# Patient Record
Sex: MALE | Race: WHITE | Employment: OTHER | URBAN - METROPOLITAN AREA
[De-identification: names, ages, dates, MRNs, and addresses within clinical notes are randomized per-mention and may not be internally consistent; named-entity substitution may affect disease eponyms.]

---

## 2017-04-13 ENCOUNTER — HOSPITAL ENCOUNTER (OUTPATIENT)
Dept: CT IMAGING | Age: 57
Discharge: HOME | End: 2017-04-13
Attending: INTERNAL MEDICINE
Payer: COMMERCIAL

## 2017-04-13 DIAGNOSIS — D68.59 PROTEIN C DEFICIENCY (HCC): ICD-10-CM

## 2017-04-13 PROCEDURE — 70450 CT HEAD/BRAIN W/O DYE: CPT

## 2017-05-19 ENCOUNTER — HOSPITAL ENCOUNTER (OUTPATIENT)
Dept: ULTRASOUND IMAGING | Age: 57
Discharge: HOME | End: 2017-05-19
Attending: INTERNAL MEDICINE
Payer: COMMERCIAL

## 2017-05-19 DIAGNOSIS — R74.8 ELEVATED LIVER ENZYMES: ICD-10-CM

## 2017-05-19 PROCEDURE — 76700 US EXAM ABDOM COMPLETE: CPT

## 2017-05-22 NOTE — PROGRESS NOTES
Abdominal ultrasound report reviewed and shows fatty infiltration which is likely the cause of his elevated liver enzymes, but if he can not lose weight and normalize the liver panel, he may need to consult with a liver specialist.

## 2018-01-25 ENCOUNTER — HOSPITAL ENCOUNTER (OUTPATIENT)
Dept: MRI IMAGING | Age: 58
Discharge: HOME | End: 2018-01-25
Attending: INTERNAL MEDICINE
Payer: COMMERCIAL

## 2018-01-25 VITALS — BODY MASS INDEX: 36.96 KG/M2 | WEIGHT: 236 LBS

## 2018-01-25 DIAGNOSIS — R53.1 PROGRESSIVE FOCAL MOTOR WEAKNESS: ICD-10-CM

## 2018-01-25 DIAGNOSIS — M62.81 MUSCLE WEAKNESS OF RIGHT UPPER EXTREMITY: ICD-10-CM

## 2018-01-25 PROCEDURE — 70553 MRI BRAIN STEM W/O & W/DYE: CPT

## 2018-01-25 PROCEDURE — A9577 INJ MULTIHANCE: HCPCS

## 2018-01-25 PROCEDURE — 74011250636 HC RX REV CODE- 250/636

## 2018-01-25 RX ORDER — SODIUM CHLORIDE 0.9 % (FLUSH) 0.9 %
10 SYRINGE (ML) INJECTION
Status: COMPLETED | OUTPATIENT
Start: 2018-01-25 | End: 2018-01-25

## 2018-01-25 RX ADMIN — GADOBENATE DIMEGLUMINE 20 ML: 529 INJECTION, SOLUTION INTRAVENOUS at 09:38

## 2018-01-25 RX ADMIN — Medication 10 ML: at 09:38

## 2018-01-25 NOTE — PROGRESS NOTES
MRI Brain report reviewed and showed changes commonly seen in patients his age without any evidence of an abnormality that would account for his symptoms

## 2018-02-27 PROBLEM — E66.01 OBESITY, MORBID (HCC): Status: ACTIVE | Noted: 2018-02-27

## 2018-03-01 PROBLEM — R74.8 ELEVATED LIVER ENZYMES: Status: ACTIVE | Noted: 2018-03-01

## 2018-03-01 PROBLEM — R74.8 ELEVATED CK: Status: ACTIVE | Noted: 2018-03-01

## 2018-03-02 ENCOUNTER — APPOINTMENT (OUTPATIENT)
Dept: GENERAL RADIOLOGY | Age: 58
End: 2018-03-02
Attending: EMERGENCY MEDICINE
Payer: COMMERCIAL

## 2018-03-02 ENCOUNTER — HOSPITAL ENCOUNTER (EMERGENCY)
Age: 58
Discharge: HOME | End: 2018-03-02
Attending: EMERGENCY MEDICINE
Payer: COMMERCIAL

## 2018-03-02 VITALS
OXYGEN SATURATION: 94 % | HEIGHT: 66 IN | TEMPERATURE: 98.4 F | HEART RATE: 73 BPM | WEIGHT: 245 LBS | BODY MASS INDEX: 39.37 KG/M2 | RESPIRATION RATE: 20 BRPM | DIASTOLIC BLOOD PRESSURE: 87 MMHG | SYSTOLIC BLOOD PRESSURE: 154 MMHG

## 2018-03-02 DIAGNOSIS — M33.20 POLYMYOSITIS (HCC): Primary | ICD-10-CM

## 2018-03-02 LAB
ALBUMIN SERPL-MCNC: 3.2 G/DL (ref 3.5–5)
ALBUMIN/GLOB SERPL: 0.9 {RATIO} (ref 1.2–3.5)
ALP SERPL-CCNC: 69 U/L (ref 50–136)
ALT SERPL-CCNC: 355 U/L (ref 12–65)
ANION GAP SERPL CALC-SCNC: 7 MMOL/L (ref 7–16)
APPEARANCE UR: CLEAR
AST SERPL-CCNC: 199 U/L (ref 15–37)
ATRIAL RATE: 83 BPM
BACTERIA URNS QL MICRO: 0 /HPF
BASOPHILS # BLD: 0 K/UL (ref 0–0.2)
BASOPHILS NFR BLD: 0 % (ref 0–2)
BILIRUB SERPL-MCNC: 0.4 MG/DL (ref 0.2–1.1)
BILIRUB UR QL: NEGATIVE
BNP SERPL-MCNC: 14 PG/ML
BUN SERPL-MCNC: 16 MG/DL (ref 6–23)
CALCIUM SERPL-MCNC: 8.5 MG/DL (ref 8.3–10.4)
CALCULATED P AXIS, ECG09: 39 DEGREES
CALCULATED R AXIS, ECG10: 18 DEGREES
CALCULATED T AXIS, ECG11: 39 DEGREES
CASTS URNS QL MICRO: ABNORMAL /LPF
CHLORIDE SERPL-SCNC: 103 MMOL/L (ref 98–107)
CK SERPL-CCNC: 7520 U/L (ref 21–215)
CO2 SERPL-SCNC: 27 MMOL/L (ref 21–32)
COLOR UR: YELLOW
CREAT SERPL-MCNC: 0.6 MG/DL (ref 0.8–1.5)
DIAGNOSIS, 93000: NORMAL
DIFFERENTIAL METHOD BLD: ABNORMAL
EOSINOPHIL # BLD: 0 K/UL (ref 0–0.8)
EOSINOPHIL NFR BLD: 0 % (ref 0.5–7.8)
EPI CELLS #/AREA URNS HPF: ABNORMAL /HPF
ERYTHROCYTE [DISTWIDTH] IN BLOOD BY AUTOMATED COUNT: 14.5 % (ref 11.9–14.6)
GLOBULIN SER CALC-MCNC: 3.7 G/DL (ref 2.3–3.5)
GLUCOSE SERPL-MCNC: 155 MG/DL (ref 65–100)
GLUCOSE UR STRIP.AUTO-MCNC: NEGATIVE MG/DL
HCT VFR BLD AUTO: 42.7 % (ref 41.1–50.3)
HGB BLD-MCNC: 14.3 G/DL (ref 13.6–17.2)
HGB UR QL STRIP: ABNORMAL
IMM GRANULOCYTES # BLD: 0.1 K/UL (ref 0–0.5)
IMM GRANULOCYTES NFR BLD AUTO: 1 % (ref 0–5)
KETONES UR QL STRIP.AUTO: NEGATIVE MG/DL
LEUKOCYTE ESTERASE UR QL STRIP.AUTO: NEGATIVE
LYMPHOCYTES # BLD: 0.3 K/UL (ref 0.5–4.6)
LYMPHOCYTES NFR BLD: 3 % (ref 13–44)
MCH RBC QN AUTO: 32.6 PG (ref 26.1–32.9)
MCHC RBC AUTO-ENTMCNC: 33.5 G/DL (ref 31.4–35)
MCV RBC AUTO: 97.3 FL (ref 79.6–97.8)
MONOCYTES # BLD: 0.2 K/UL (ref 0.1–1.3)
MONOCYTES NFR BLD: 2 % (ref 4–12)
NEUTS SEG # BLD: 9.7 K/UL (ref 1.7–8.2)
NEUTS SEG NFR BLD: 94 % (ref 43–78)
NITRITE UR QL STRIP.AUTO: NEGATIVE
P-R INTERVAL, ECG05: 150 MS
PH UR STRIP: 6 [PH] (ref 5–9)
PLATELET # BLD AUTO: 221 K/UL (ref 150–450)
PMV BLD AUTO: 8.9 FL (ref 10.8–14.1)
POTASSIUM SERPL-SCNC: 4.3 MMOL/L (ref 3.5–5.1)
PROT SERPL-MCNC: 6.9 G/DL (ref 6.3–8.2)
PROT UR STRIP-MCNC: ABNORMAL MG/DL
Q-T INTERVAL, ECG07: 350 MS
QRS DURATION, ECG06: 94 MS
QTC CALCULATION (BEZET), ECG08: 411 MS
RBC # BLD AUTO: 4.39 M/UL (ref 4.23–5.67)
RBC #/AREA URNS HPF: ABNORMAL /HPF
SODIUM SERPL-SCNC: 137 MMOL/L (ref 136–145)
SP GR UR REFRACTOMETRY: 1.02 (ref 1–1.02)
T4 FREE SERPL-MCNC: 1.1 NG/DL (ref 0.78–1.46)
TROPONIN I BLD-MCNC: 0.01 NG/ML (ref 0.02–0.05)
TSH SERPL DL<=0.005 MIU/L-ACNC: 12.8 UIU/ML (ref 0.36–3.74)
UROBILINOGEN UR QL STRIP.AUTO: 1 EU/DL (ref 0.2–1)
VENTRICULAR RATE, ECG03: 83 BPM
WBC # BLD AUTO: 10.4 K/UL (ref 4.3–11.1)
WBC URNS QL MICRO: ABNORMAL /HPF

## 2018-03-02 PROCEDURE — 81001 URINALYSIS AUTO W/SCOPE: CPT | Performed by: EMERGENCY MEDICINE

## 2018-03-02 PROCEDURE — 82550 ASSAY OF CK (CPK): CPT | Performed by: EMERGENCY MEDICINE

## 2018-03-02 PROCEDURE — 96374 THER/PROPH/DIAG INJ IV PUSH: CPT | Performed by: EMERGENCY MEDICINE

## 2018-03-02 PROCEDURE — 96361 HYDRATE IV INFUSION ADD-ON: CPT | Performed by: EMERGENCY MEDICINE

## 2018-03-02 PROCEDURE — 83880 ASSAY OF NATRIURETIC PEPTIDE: CPT | Performed by: EMERGENCY MEDICINE

## 2018-03-02 PROCEDURE — 83874 ASSAY OF MYOGLOBIN: CPT | Performed by: EMERGENCY MEDICINE

## 2018-03-02 PROCEDURE — 99285 EMERGENCY DEPT VISIT HI MDM: CPT | Performed by: EMERGENCY MEDICINE

## 2018-03-02 PROCEDURE — 93005 ELECTROCARDIOGRAM TRACING: CPT | Performed by: EMERGENCY MEDICINE

## 2018-03-02 PROCEDURE — 85025 COMPLETE CBC W/AUTO DIFF WBC: CPT | Performed by: EMERGENCY MEDICINE

## 2018-03-02 PROCEDURE — 80053 COMPREHEN METABOLIC PANEL: CPT | Performed by: EMERGENCY MEDICINE

## 2018-03-02 PROCEDURE — 84443 ASSAY THYROID STIM HORMONE: CPT | Performed by: EMERGENCY MEDICINE

## 2018-03-02 PROCEDURE — 84484 ASSAY OF TROPONIN QUANT: CPT

## 2018-03-02 PROCEDURE — 84439 ASSAY OF FREE THYROXINE: CPT | Performed by: EMERGENCY MEDICINE

## 2018-03-02 PROCEDURE — 71046 X-RAY EXAM CHEST 2 VIEWS: CPT

## 2018-03-02 PROCEDURE — 74011250636 HC RX REV CODE- 250/636: Performed by: EMERGENCY MEDICINE

## 2018-03-02 RX ADMIN — SODIUM CHLORIDE 1000 ML: 900 INJECTION, SOLUTION INTRAVENOUS at 14:00

## 2018-03-02 RX ADMIN — METHYLPREDNISOLONE SODIUM SUCCINATE 125 MG: 125 INJECTION, POWDER, FOR SOLUTION INTRAMUSCULAR; INTRAVENOUS at 17:05

## 2018-03-02 NOTE — PROGRESS NOTES
Visited pt while he was waiting for care in ER. Pt's spouse asked for a pillow;  supplied same & assured pt & spouse that we care about them and were working to meet their needs.

## 2018-03-02 NOTE — ED NOTES
I have reviewed discharge instructions with the patient. The patient verbalized understanding. Patient to follow up with PMD and RTED with any changes/concerns. Patient expresses understanding. No changes in med regimen- patient to follow recent changes as set forth by his MD. Patient from eD via wheelchair with 1 person assist. Patient strongly advised to RTED with any changes/concerns. Patient driven home by wife.

## 2018-03-02 NOTE — ED PROVIDER NOTES
HPI Comments: 40-year-old male with a recently diagnosed polymyositis presents to the ER due to increasing weakness, some mild shortness of breath and diaphoretic episodes. Patient is currently on steroids and Imuran    Review showed the patient has had high CPK levels in the 10,000 range, elevated TSH, and positive urine myoglobins. The patient has been managed as an outpatient with the assistance of his rheumatologist and primary care provider. Patient is a 61 y.o. male presenting with abnormal lab results. The history is provided by the patient and the spouse. Abnormal Lab Results   This is a recurrent problem. The current episode started more than 1 week ago. The problem has been gradually worsening. Associated symptoms include shortness of breath. Pertinent negatives include no chest pain, no abdominal pain and no headaches. The symptoms are aggravated by exertion, standing and walking.  The symptoms are relieved by rest.        Past Medical History:   Diagnosis Date    Angina pectoris ([address]     Anxiety     ASHD (arteriosclerotic heart disease) 2014    Calcium score = 1056    Coronary artery disease     Coronary atherosclerosis due to calcified coronary lesion (CODE) 5/23/2016    Diverticulitis     Duodenal ulcer     DVT (deep venous thrombosis) (HCC)     2 episodes    GERD (gastroesophageal reflux disease)     HTN (hypertension)     Hypercholesterolemia     Hypothyroidism     Kidney stones     Personal history of DVT (deep vein thrombosis) 11/18/2016    Protein C deficiency ([address]     Sleep apnea     using CPAP    Varicella        Past Surgical History:   Procedure Laterality Date    HX COLONOSCOPY  2013    HX HEART CATHETERIZATION  2015    HX URETEROLITHOTOMY Right 2013    HX WISDOM TEETH EXTRACTION  1980's         Family History:   Problem Relation Age of Onset    Hypertension Brother     Heart Disease Father     Alzheimer Father     Heart Surgery Father      mid to late 58s    Other Mother      Gannon Ast issue\"    Pulmonary Embolism Mother      protien C deficiency    Heart Surgery Paternal Aunt     Heart Attack Paternal Uncle     Heart Surgery Paternal Uncle      in 76s    Diabetes Paternal Uncle     Kidney Disease Paternal Uncle     Kidney Disease Son      secondary to congenital vessel disease       Social History     Social History    Marital status:      Spouse name: N/A    Number of children: N/A    Years of education: N/A     Occupational History    Trainer Unknown     3[employer]     Social History Main Topics    Smoking status: Former Smoker     Quit date: 5/2/2006    Smokeless tobacco: Former User    Alcohol use 0.5 oz/week     1 Standard drinks or equivalent per week    Drug use: No    Sexual activity: Not on file     Other Topics Concern    Not on file     Social History Narrative         ALLERGIES: Review of patient's allergies indicates no known allergies. Review of Systems   Constitutional: Negative for activity change, chills, diaphoresis and fever. HENT: Negative for dental problem, hearing loss, nosebleeds, rhinorrhea and sore throat. Eyes: Negative for pain, discharge, redness and visual disturbance. Respiratory: Positive for shortness of breath. Negative for cough and chest tightness. Cardiovascular: Negative for chest pain, palpitations and leg swelling. Gastrointestinal: Negative for abdominal pain, constipation, diarrhea, nausea and vomiting. Endocrine: Negative for cold intolerance, heat intolerance, polydipsia and polyuria. Genitourinary: Negative for dysuria and flank pain. Musculoskeletal: Negative for arthralgias, back pain, joint swelling, myalgias and neck pain. Skin: Negative for pallor and rash. Allergic/Immunologic: Negative for environmental allergies and food allergies. Neurological: Negative for dizziness, tremors, light-headedness, numbness and headaches. Hematological: Negative for adenopathy.  Does not bruise/bleed easily. Psychiatric/Behavioral: Negative for confusion and dysphoric mood. The patient is not nervous/anxious and is not hyperactive. All other systems reviewed and are negative. Vitals:    03/02/18 1221 03/02/18 1313 03/02/18 1456 03/02/18 1525   BP: 142/81 141/70 127/65 136/68   Pulse: 93 92     Resp: 20      Temp: 98.3 °F (36.8 °C) 98.1 °F (36.7 °C)     SpO2: 95% 94% 96% 96%   Weight: 111.1 kg (245 lb)      Height: 5' 6\" (1.676 m)               Physical Exam   Constitutional: He is oriented to person, place, and time. He appears well-developed and well-nourished. He appears distressed. HENT:   Head: Normocephalic and atraumatic. Mouth/Throat: Oropharynx is clear and moist.   Eyes: Conjunctivae and EOM are normal. Pupils are equal, round, and reactive to light. Right eye exhibits no discharge. Left eye exhibits no discharge. No scleral icterus. Neck: Normal range of motion. Neck supple. No JVD present. Cardiovascular: Normal rate, regular rhythm, normal heart sounds and intact distal pulses. Exam reveals no gallop and no friction rub. No murmur heard. Pulmonary/Chest: Effort normal and breath sounds normal. No respiratory distress. He has no wheezes. Abdominal: Soft. Bowel sounds are normal. He exhibits no distension. There is no hepatosplenomegaly. There is no tenderness. There is no rebound and no guarding. Musculoskeletal: Normal range of motion. He exhibits no edema or tenderness. Trace edema   Lymphadenopathy:     He has no cervical adenopathy. Neurological: He is alert and oriented to person, place, and time. He has normal strength. No cranial nerve deficit or sensory deficit. He exhibits normal muscle tone. GCS eye subscore is 4. GCS verbal subscore is 5. GCS motor subscore is 6. Skin: Skin is warm and dry. No rash noted. He is not diaphoretic. No erythema. mild diaphoresis   Psychiatric: He has a normal mood and affect.  His speech is normal and behavior is normal. Judgment and thought content normal. Cognition and memory are normal.   Nursing note and vitals reviewed. MDM  Number of Diagnoses or Management Options  Polymyositis ([address]): established and worsening  Diagnosis management comments: 58-year-old male with polymyositis. Workup today reveals gradually improving. CPK. Kidney function is normal.  Patient's LFTs are slightly elevated. Urine myoglobin is a send out test and will have available for 3-4 days. At this point, the patient is clinically stable. I will discharge the patient home. I have encouraged him to monitor for any worsening symptoms and to make sure he is followed by his doctors on Monday morning. Patient is instructed to return to the ER if he has any new or worsening symptoms or other concerns over the course of this weekend. Amount and/or Complexity of Data Reviewed  Clinical lab tests: ordered and reviewed  Tests in the radiology section of CPT®: ordered and reviewed  Tests in the medicine section of CPT®: ordered and reviewed  Obtain history from someone other than the patient: yes  Review and summarize past medical records: yes  Independent visualization of images, tracings, or specimens: yes    Risk of Complications, Morbidity, and/or Mortality  Presenting problems: high  Diagnostic procedures: moderate  Management options: moderate  General comments: Elements of this note have been dictated via voice recognition software. Text and phrases may be limited by the accuracy of the software. The chart has been reviewed, but errors may still be present.       Patient Progress  Patient progress: stable        ED Course       Procedures

## 2018-03-02 NOTE — ED TRIAGE NOTES
Patient reports increased weakness and shortness of breath. Recent thyroid lab work elevated, per patient.

## 2018-03-02 NOTE — ED NOTES
Patient assisted to restroom. Patient able to stand/pivot to wheelchair with minimal assistance from RNs.

## 2018-03-05 LAB — MYOGLOBIN UR-MCNC: 3441 NG/ML (ref 0–13)

## 2018-04-16 PROBLEM — M62.82 RHABDOMYOLYSIS: Status: ACTIVE | Noted: 2018-01-01

## 2018-06-18 PROBLEM — T38.0X5A STEROID-INDUCED DIABETES MELLITUS (HCC): Status: ACTIVE | Noted: 2018-06-18

## 2018-06-18 PROBLEM — E09.9 STEROID-INDUCED DIABETES MELLITUS (HCC): Status: ACTIVE | Noted: 2018-06-18

## 2018-06-18 PROBLEM — R73.09 ELEVATED RANDOM BLOOD GLUCOSE LEVEL: Status: ACTIVE | Noted: 2018-06-18

## 2018-07-16 ENCOUNTER — HOSPITAL ENCOUNTER (OUTPATIENT)
Dept: GENERAL RADIOLOGY | Age: 58
Discharge: HOME | End: 2018-07-16
Payer: COMMERCIAL

## 2018-07-16 DIAGNOSIS — R06.00 DYSPNEA, UNSPECIFIED TYPE: ICD-10-CM

## 2018-07-16 PROCEDURE — 71046 X-RAY EXAM CHEST 2 VIEWS: CPT

## 2018-07-23 ENCOUNTER — HOSPITAL ENCOUNTER (OUTPATIENT)
Dept: PHYSICAL THERAPY | Age: 58
Discharge: HOME | End: 2018-07-23
Attending: INTERNAL MEDICINE
Payer: COMMERCIAL

## 2018-07-23 DIAGNOSIS — M33.20 POLYMYOSITIS (HCC): ICD-10-CM

## 2018-07-23 DIAGNOSIS — M62.81 MUSCLE WEAKNESS (GENERALIZED): ICD-10-CM

## 2018-07-23 PROCEDURE — 97110 THERAPEUTIC EXERCISES: CPT

## 2018-07-23 PROCEDURE — 97161 PT EVAL LOW COMPLEX 20 MIN: CPT

## 2018-07-23 NOTE — THERAPY EVALUATION
Zac Benítez  : 1960      Payor: Reyes Medrano / Plan: [facility] [facility] / Product Type: PPO /  [address] [facility], [address], [place], [id number] [place] [address] Road  [phone]   [phone]       OUTPATIENT PHYSICAL THERAPY:Initial Assessment and Daily Note 2018  Visit # 1   ICD-10: Treatment Diagnosis:                  Unsteadiness on feet (R26.81)    Polymyositis (HCC) [M33.20]  Muscle weakness (generalized) [M62.81]       Precautions/Allergies:   Review of patient's allergies indicates no known allergies. Fall Risk Score: 1 (? 5 = High Risk)  MD Orders: Eval and Treat  MEDICAL/REFERRING DIAGNOSIS:  Polymyositis (HCC) [M33.20]  Muscle weakness (generalized) [M62.81]   DATE OF ONSET: Chronic  REFERRING PHYSICIAN: Ronni Gonzales MD  RETURN PHYSICIAN APPOINTMENT: TBD by Zac Benítez      INITIAL ASSESSMENT:  Mr. Zac Benítez has attended 1 physical therapy session including initial evaluation. Zac Benítez has been experiencing mm weakness and decreased cardiopulmonary endurance with concomitant decreased flexibility, decreased postural and core strength, decreased functional tolerance. Zac Benítez will benefit from skilled PT (medically necessary) to address above deficits affecting participation in basic ADLs and overall functional tolerance. Manual techniques (stretching, joint mobilizations, soft tissue mobilization/myofascial release), postural exercises/education, therapeutic techniques/activities, and HEP will be performed as appropriate addressing Raquel Viera's current condition. PROBLEM LIST (Impacting functional limitations):  1. Decreased Strength  2. Decreased ADL/Functional Activities  3. Decreased Transfer Abilities  4. Decreased Ambulation Ability/Technique  5. Decreased Balance  6. Increased Pain  7. Decreased Activity Tolerance  8. Increased Fatigue  9.  Increased Shortness of Breath  10. Decreased Flexibility/Joint Mobility  11. Decreased [place] with Home Exercise Program INTERVENTIONS PLANNED:  1. Balance Exercise  2. Bed Mobility  3. Cold  4. Cryotherapy  5. Electrical Stimulation  6. Family Education  7. Gait Training  8. Heat  9. Home Exercise Program (HEP)  10. Manual Therapy  11. Neuromuscular Re-education/Strengthening  12. Range of Motion (ROM)  13. Therapeutic Activites  14. Therapeutic Exercise/Strengthening  15. Transfer Training   TREATMENT PLAN:  Effective Dates: 7.23.18 TO 10/21/2018 (90 days). Frequency/Duration: 2 times a week for 90 Days  GOALS: (Goals have been discussed and agreed upon with patient.)  Short Term Goals 4 weeks   1. Shayy Chowdhury will be independent with HEP  2. Shayy Pulse will participate in LE stretching program to increase flexibility  3. Shayy Pulse will be able to ambulate 10 minutes with no rest break and O2 level >90%. 4. Shayy Pulse will be able to stand with good posture and no SOB or R[place]ORD for 5 minutes improving current level of function. 5. Shayy Pulse will participate in LE strengthening program with weights as appropriate to help with gait and elevations  6. Cherylee Pulse will participate in static and dynamic balance activities to decrease the risk for falls  7. Shayy Pulse will tolerate manual therapy/joint mobilizations/soft tissue to increase ROM and decrease pain  8. Long Term Goals 8 weeks   1. Shayy Pulse will improve TUG to <=12 seconds showing improved ambulation safety. 2. Shayy Pulse will demonstrate 5/5 LE strength on manual muscle testing  3.  Shayy Pulse will be able to perform SLS >5 seconds bilaterally to help with gait and improve balance  Rehabilitation Potential For Stated Goals: Good  Regarding Carmina Viera's therapy, I certify that the treatment plan above will be carried out by a therapist or under their direction. Thank you for this referral,  Chelsi Warren DPT     Referring Physician Signature: Karely Li MD              Date                    HISTORY:   History of Present Injury/Illness (Reason for Referral):  Shayy Chowdhury has been experiencing muscle weakness ---- Shayy Chowdhury states last year he started getting weakness in arms and legs. He lost ROM of R arm and couldn't raise it up to his head. He finally was encouraged to go to MD by wife. Shayy Chowdhury first thought it was Myasthenias Gravis; however, after bloodwork it was not. Blood work revealed Polymyositis. He's been on steroids since February---he got to the point where he could not get out of bed in March. He has hx of Rhabdomyolysis that required hospitalization. He has since gotten stronger,  But is looking to get in better shape. His voice has been affected as well. .        -Present symptoms/complaints (on day of evaluation)  Pain Scale:  · Current: 0/10  · Best: 0/10  · Worst: 0/10      Past Medical History/Comorbidities:   Mr. Humble Guevara  has a past medical history of Angina pectoris ([address] Anxiety; ASHD (arteriosclerotic heart disease) (2014); Coronary artery disease; Coronary atherosclerosis due to calcified coronary lesion (CODE) (5/23/2016); Diverticulitis; Duodenal ulcer; DVT (deep venous thrombosis) ([address] GERD (gastroesophageal reflux disease); HTN (hypertension); Hypercholesterolemia; Hypothyroidism; Kidney stones; Myositis; Personal history of DVT (deep vein thrombosis) (11/18/2016); Protein C deficiency ([address] Rhabdomyolysis (2018); Sleep apnea; 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 ([address] and Varicella. Mr. Humble Guevara  has a past surgical history that includes hx colonoscopy (2013); hx wisdom teeth extraction (1980's); hx ureterolithotomy (Right, 2013); and hx heart catheterization (2015).   Social History/Living Environment:        Social History     Social History    Marital status:      Spouse name: N/A  Number of children: N/A    Years of education: N/A     Occupational History    Trainer Unknown     3M     Social History Main Topics    Smoking status: Former Smoker     Quit date: 5/2/2006    Smokeless tobacco: Former User    Alcohol use 0.5 oz/week     1 Standard drinks or equivalent per week      Comment: ocass    Drug use: No    Sexual activity: Not on file     Other Topics Concern    Not on file     Social History Narrative     Prior Level of Function/Work/Activity:  Independent    Active Ambulatory Problems     Diagnosis Date Noted    Hypercholesterolemia     Sleep apnea     Primary hypothyroidism     GERD (gastroesophageal reflux disease)     ASHD (arteriosclerotic heart disease)     Coronary atherosclerosis due to calcified coronary lesion (CODE) 05/23/2016    Personal history of DVT (deep vein thrombosis) 11/18/2016    HTN (hypertension)     Obesity, morbid ([address] 02/27/2018    Coronary artery disease     DVT (deep venous thrombosis) (HCC)     Elevated liver enzymes 03/01/2018    Elevated CK 03/01/2018    Rhabdomyolysis 01/01/2018    Myositis     Elevated random blood glucose level 06/18/2018    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[address] 06/18/2018    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 ([address]      Resolved Ambulatory Problems     Diagnosis Date Noted    DVT (deep venous thrombosis) (HCC)      Past Medical History:   Diagnosis Date    Angina pectoris ([address]     Anxiety     ASHD (arteriosclerotic heart disease) 2014    Coronary artery disease     Coronary atherosclerosis due to calcified coronary lesion (CODE) 5/23/2016    Diverticulitis     Duodenal ulcer     DVT (deep venous thrombosis) (HCC)     GERD (gastroesophageal reflux disease)     HTN (hypertension)     Hypercholesterolemia     Hypothyroidism     Kidney stones     Myositis     Personal history of DVT (deep vein thrombosis) 11/18/2016    Protein C deficiency ([address]     Rhabdomyolysis 2018    Sleep apnea     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 ([facility]     Varicella      Note: Patient denies any increase of symptoms with cough, sneeze or valsalva. Patient denies any saddle paresthesia or bowel/bladder deficits. Personal Factors:          Sex:  male        Age:  61 y.o. Current Medications:    Current Outpatient Prescriptions:     methylPREDNISolone (MEDROL) 32 mg tablet, Take 1.5 tablets by mouth daily, Disp: 45 Tab, Rfl: 1    fluconazole (DIFLUCAN) 100 mg tablet, Take 1 Tab by mouth daily for 7 days. FDA advises cautious prescribing of oral fluconazole in pregnancy. , Disp: 7 Tab, Rfl: 0    terbinafine HCl (LAMISIL) 250 mg tablet, Take 1 Tab by mouth daily. , Disp: 14 Tab, Rfl: 0    ketoconazole (NIZORAL) 2 % topical cream, Apply  to affected area daily. , Disp: 60 g, Rfl: 3    nitroglycerin (NITROSTAT) 0.4 mg SL tablet, 1 Tab by SubLINGual route every five (5) minutes as needed for Chest Pain., Disp: 25 Tab, Rfl: 3    amLODIPine (NORVASC) 5 mg tablet, Take 1 Tab by mouth daily. , Disp: 90 Tab, Rfl: 3    lisinopril-hydroCHLOROthiazide (PRINZIDE, ZESTORETIC) 10-12.5 mg per tablet, Take 1 Tab by mouth daily. , Disp: 90 Tab, Rfl: 3    metFORMIN (GLUCOPHAGE) 500 mg tablet, Take 2 Tabs by mouth two (2) times daily (with meals). , Disp: 360 Tab, Rfl: 3    alcohol swabs (ALCOHOL PREP PADS) padm, Use with insulin injections and to check glucose 4 times daily, E09.9, Disp: 400 Pad, Rfl: 3    DENZEL PEN NEEDLE 32 gauge x 5/32\" ndle, Use with insulin up to 4 times daily, E09.9, Disp: 400 Pen Needle, Rfl: 3    UNITHROID 112 mcg tablet, Take 2 tabs PO daily on empty stomach, ANDRADE 1, Disp: 180 Tab, Rfl: 3    NOVOLOG FLEXPEN U-100 INSULIN 100 unit/mL inpn, Use with correction 2/50>150, max daily dose 20 units, Disp: 6 Pen, Rfl: 3    magic mouthwash solution, Magic mouth wash  Maalox Lidocaine 2% viscous  Diphenhydramine oral solution  Nystatin                                    pharmacy to mix equal portions of ingredients to a total volume as indicated in the dispense amount. Swish and swallow 1 tsp qid, Disp: 360 mL, Rfl: 1    azaTHIOprine (IMURAN) 50 mg tablet, Take 2 Tabs by mouth two (2) times a day., Disp: 360 Tab, Rfl: 1    DULoxetine (CYMBALTA) 30 mg capsule, Take 1 Cap by mouth daily. , Disp: 90 Cap, Rfl: 1    buPROPion SR (WELLBUTRIN, ZYBAN) 200 mg SR tablet, Take 1 Tab by mouth two (2) times a day., Disp: 180 Tab, Rfl: 1    metoprolol succinate (TOPROL-XL) 25 mg XL tablet, Take 1 Tab by mouth daily. , Disp: 90 Tab, Rfl: 1    warfarin (JANTOVEN) 5 mg tablet, Take 1 Tab by mouth daily. , Disp: 90 Tab, Rfl: 1    Prothrombin Time Test Strips (COAGUCHEK XS) strp, Test 1-4 times per month as directed, Disp: 48 Strip, Rfl: 1    fexofenadine (ALLEGRA) 180 mg tablet, Take 180 mg by mouth daily. , Disp: , Rfl:     aspirin 81 mg tablet, Take 81 mg by mouth daily. , Disp: , Rfl:     Omega-3-DHA-EPA-Fish Oil (FISH OIL) 1,000 (120-180) mg Cap, Take 3 Caps by mouth daily. , Disp: , Rfl:      Date Last Reviewed:  7/23/2018   Number of Personal Factors/Comorbidities that affect the Plan of Care: 3+: HIGH COMPLEXITY   EXAMINATION:   Observation/Orthostatic Postural Assessment:          *Rounded shoulders  Palpation:          Nothing tender and no complaints of tenderness.     ROM:            AROM/PROM         Joint: Eval Date: 7/23/18  Re-Assess Date:  Re-Assess Date:    Active ROM RIGHT LEFT RIGHT LEFT RIGHT LEFT   Knee Extension 0 0       Knee Flexion [facility]       Hip Flexion 40  60       Hip Abduction         Lumbar Flexion 100%        Lumbar Extension 100%        Lumbar Side-bending 100% 100%       Lumbar Rotation 90% 90%          Strength:     Eval Date: 7/23/18  Re-Assess Date:  Re-Assess Date:      RIGHT LEFT RIGHT LEFT RIGHT LEFT   Knee Flexion  4+/5 4+/5       Knee Extension (L3, L4) 4/5 4/5       Hip Flexion (L1, L2) 4/5 4/5       Hip Abduction (L5, S1) 4/5 4/5       Ankle Dorsiflexion  5/5 5/5       Great Toe Extension (L5) Single leg stance right/left: 2 seconds bilaterally EO              Deep squat: Can perform but off balance. Ham 90/90:   RIGHT LE: 40   LEFT LE: 60      Neurological Screen:    RADIATING SYMPTOMS?: YES/NO*--NONE  Functional Mobility:  Affecting participation in basic ADLs and functional tasks.  cardiopulmonary endurance. Balance: Body Structures Involved:  1. Bones  2. Joints  3. Muscles  4. Ligaments Body Functions Affected:  1. Sensory/Pain  2. Neuromusculoskeletal  3. Movement Related Activities and Participation Affected:  1. Mobility  2. Self Care   Number of elements that affect the Plan of Care: 4+: HIGH COMPLEXITY   CLINICAL PRESENTATION:   Presentation: Stable and uncomplicated: LOW COMPLEXITY   CLINICAL DECISION MAKING:   Tool Used: Timed Up and Go (TUG)  Score:  Initial: 20 seconds Most Recent: X seconds (Date: -- )   Interpretation of Score: The test measures, in seconds, the time taken by an individual to stand up from a standard arm chair (seat height 46 cm [18 in], arm height 65 cm [25.6 in]), walk a distance of 3 meters (118 in, approx 10 ft), turn, walk back to the chair and sit down. If the individual takes longer than 14 seconds to complete TUG, this indicates risk for falls. Score 7 7.5-10.5 11-14 14.5-17.5 18-21 21.5-24.5 25+   Modifier CH CI CJ CK CL CM CN     ? Mobility - Walking and Moving Around:     - CURRENT STATUS: CL - 60%-79% impaired, limited or restricted    - GOAL STATUS: CK - 40%-59% impaired, limited or restricted    - D/C STATUS:  ---------------To be determined---------------      Medical Necessity:   · Skilled intervention continues to be required due to decreased cardiopulmonary endurance affecting QOL and overall health and ability to perform ADLs.     Reason for Services/Other Comments:  · Patient continues to require skilled intervention due to  above deficits affecting participation in basic ADLs and overall functional tolerance. Use of outcome tool(s) and clinical judgement create a POC that gives a: Clear prediction of patient's progress: LOW COMPLEXITY   TREATMENT:   (In addition to Assessment/Re-Assessment sessions the following treatments were rendered)  THERAPEUTIC EXERCISE: (25 minutes):  Exercises per grid below to improve mobility, strength and balance. Required minimal visual and verbal cues to promote proper body alignment and promote proper body posture. Progressed resistance, range and complexity of movement as indicated. Date:  7.23.18 Date:   Date:     Activity/Exercise Parameters Parameters Parameters   Marching 10     Heel Raises 10     Standing Hip Abduction 10     Standing Hip Extension 10     Minisquats 10                     Treatment/Session Assessment:  Izabela Voss verbalized understanding of role of PT and POC. · Pain/ Symptoms: Initial:   0/10 Post Session:  0/10 ·   Compliance with Program/Exercises: Will assess as treatment progresses. · Recommendations/Intent for next treatment session: \"Next visit will focus on advancements to more challenging activities\".     Future Appointments  Date Time Provider Jagjit Rosas   7/27/2018 3:62 AM S[facility] CT [facility] SLICE UNIT 1 [facility]   8/1/2018 8:45 AM Jair Maria MD [facility] UCDG UCD   8/13/2018 9:30 AM Beto Mobley MD [facility]   8/17/2018 9:30 AM EBEN NURSE ONLY [facility] EBEN EBEN   8/17/2018 10:00 AM Rene Dozier PA-C END BS ENDO   8/23/2018 12:00 PM Ronan Harrison MD [facility]   9/17/2018 2:30 PM Moiz Celis [facility]TR [facility]       Total Treatment Duration: 15 min eval, 25 min TE  PT Patient Time In/Time Out  Time In: 1600  Time Out: 1200 [facility] Dr Valentina Knight, DPT

## 2018-07-23 NOTE — PROGRESS NOTES
Ambulatory/Rehab Services H2 Model Falls Risk Assessment    Risk Factor Pts. ·   Confusion/Disorientation/Impulsivity  []    4 ·   Symptomatic Depression  []   2 ·   Altered Elimination  []   1 ·   Dizziness/Vertigo  []   1 ·   Gender (Male)  []   1 ·   Any administered antiepileptics (anticonvulsants):  []   2 ·   Any administered benzodiazepines:  []   1 ·   Visual Impairment (specify):  []   1 ·   Portable Oxygen Use  []   1 ·   Orthostatic ? BP  []   1 ·   History of Recent Falls (within 3 mos.)  [x]   5     Ability to Rise from Chair (choose one) Pts. ·   Ability to rise in a single movement  [x]   0 ·   Pushes up, successful in one attempt  []   1 ·   Multiple attempts, but successful  []   3 ·   Unable to rise without assistance  []   4   Total: (5 or greater = High Risk) *5     Falls Prevention Plan:   []                Physical Limitations to Exercise (specify):  CGA al balance activities. []                Mobility Assistance Device (type):   []                Exercise/Equipment Adaptation (specify):    ©2010 [facility] [employer] [address] [place] [address] Patent #[id number].  Federal Law prohibits the replication, distribution or use without written permission from [facility] [employer]

## 2018-07-27 ENCOUNTER — HOSPITAL ENCOUNTER (OUTPATIENT)
Dept: CT IMAGING | Age: 58
Discharge: HOME | End: 2018-07-27
Attending: INTERNAL MEDICINE
Payer: COMMERCIAL

## 2018-07-27 ENCOUNTER — HOSPITAL ENCOUNTER (OUTPATIENT)
Dept: PHYSICAL THERAPY | Age: 58
Discharge: HOME | End: 2018-07-27
Attending: INTERNAL MEDICINE
Payer: COMMERCIAL

## 2018-07-27 DIAGNOSIS — M33.20 POLYMYOSITIS (HCC): ICD-10-CM

## 2018-07-27 DIAGNOSIS — R06.00 DYSPNEA, UNSPECIFIED TYPE: ICD-10-CM

## 2018-07-27 PROCEDURE — 71250 CT THORAX DX C-: CPT

## 2018-07-27 NOTE — PROGRESS NOTES
Please inform patient that the CT of the chest was normal aside from calcifications in the coronary arteries.   Mushtaq Haque MD

## 2018-07-31 NOTE — PROGRESS NOTES
Pt notified that CT is normal. Pt asked if Cpft is still necessary? They were not scheduled, I forward the call to scheduling for assistance.

## 2018-08-01 ENCOUNTER — HOSPITAL ENCOUNTER (OUTPATIENT)
Dept: PHYSICAL THERAPY | Age: 58
Discharge: HOME | End: 2018-08-01
Attending: INTERNAL MEDICINE
Payer: COMMERCIAL

## 2018-08-01 PROCEDURE — 97110 THERAPEUTIC EXERCISES: CPT

## 2018-08-01 NOTE — THERAPY EVALUATION
Fatemeh Leahy : 1960 Payor: Delena Boeck / Plan: [place] [facility] / Product Type: PPO /  [address] at [facility] [address] [facility] [address], [facility], [address] [facility] [phone]   [phone] OUTPATIENT PHYSICAL THERAPY:Daily Note 2018  Visit # 2 ICD-10: Treatment Diagnosis:  
     
  
  
  
Unsteadiness on feet (R26.81) Polymyositis ([address] [R96.54] Muscle weakness (generalized) [M62.81] Precautions/Allergies:  
Review of patient's allergies indicates no known allergies. Fall Risk Score: 1 (? 5 = High Risk) MD Orders: Eval and Treat  MEDICAL/REFERRING DIAGNOSIS: 
Polymyositis DATE OF ONSET: Chronic REFERRING PHYSICIAN: Barbara Bush MD 
RETURN PHYSICIAN APPOINTMENT: TBD by Fatemeh Leahy INITIAL ASSESSMENT:  Mr. Fatemeh Leahy has attended 1 physical therapy session including initial evaluation. Fatemeh Leahy has been experiencing mm weakness and decreased cardiopulmonary endurance with concomitant decreased flexibility, decreased postural and core strength, decreased functional tolerance. Fatemeh Leahy will benefit from skilled PT (medically necessary) to address above deficits affecting participation in basic ADLs and overall functional tolerance. Manual techniques (stretching, joint mobilizations, soft tissue mobilization/myofascial release), postural exercises/education, therapeutic techniques/activities, and HEP will be performed as appropriate addressing Lalo Viera's current condition. PROBLEM LIST (Impacting functional limitations): 1. Decreased Strength 2. Decreased ADL/Functional Activities 3. Decreased Transfer Abilities 4. Decreased Ambulation Ability/Technique 5. Decreased Balance 6. Increased Pain 7. Decreased Activity Tolerance 8. Increased Fatigue 9. Increased Shortness of Breath 10. Decreased Flexibility/Joint Mobility 11.  Decreased [place] with Home Exercise Program INTERVENTIONS PLANNED: 
1. Balance Exercise 2. Bed Mobility 3. Cold 4. Cryotherapy 5. Electrical Stimulation 6. Family Education 7. Gait Training 8. Heat 9. Home Exercise Program (HEP) 10. Manual Therapy 11. Neuromuscular Re-education/Strengthening 12. Range of Motion (ROM) 13. Therapeutic Activites 14. Therapeutic Exercise/Strengthening 15. Transfer Training TREATMENT PLAN: 
Effective Dates: 7.23.18 TO 10/21/2018 (90 days). Frequency/Duration: 2 times a week for 90 Days GOALS: (Goals have been discussed and agreed upon with patient.) Short Term Goals 4 weeks 1. Fatemeh Leahy will be independent with HEP 2. Fatemeh Leahy will participate in LE stretching program to increase flexibility 3. Fatemeh Leahy will be able to ambulate 10 minutes with no rest break and O2 level >90%. 4. Fatemeh Leahy will be able to stand with good posture and no SOB or [place] for 5 minutes improving current level of function. 5. Fatemeh Leahy will participate in LE strengthening program with weights as appropriate to help with gait and elevations 6. Fatemeh Leahy will participate in static and dynamic balance activities to decrease the risk for falls 7. Fatemeh Leahy will tolerate manual therapy/joint mobilizations/soft tissue to increase ROM and decrease pain 8. Long Term Goals 8 weeks 1. Fatemeh Leahy will improve TUG to <=12 seconds showing improved ambulation safety. 2. Fatemeh Leahy will demonstrate 5/5 LE strength on manual muscle testing 3. Fatemeh Leahy will be able to perform SLS >5 seconds bilaterally to help with gait and improve balance Rehabilitation Potential For Stated Goals: Good Regarding Lalo Viera's therapy, I certify that the treatment plan above will be carried out by a therapist or under their direction. Thank you for this referral, 
Torrey Monroe DPT Referring Physician Signature: Mary Anne Moreno MD              Date HISTORY:  
History of Present Injury/Illness (Reason for Referral): Loree Ulrich has been experiencing muscle weakness ---- Loree Ulrich states last year he started getting weakness in arms and legs. He lost ROM of R arm and couldn't raise it up to his head. He finally was encouraged to go to MD by wife. Loree Ulrich first thought it was Myasthenias Gravis; however, after bloodwork it was not. Blood work revealed Polymyositis. He's been on steroids since February---he got to the point where he could not get out of bed in March. He has hx of Rhabdomyolysis that required hospitalization. He has since gotten stronger,  But is looking to get in better shape. His voice has been affected as well. .   
 
 
-Present symptoms/complaints (on day of evaluation) Pain Scale: · Current: 0/10 · Best: 0/10 · Worst: 0/10 Past Medical History/Comorbidities:  
Mr. Denice Lee  has a past medical history of Angina pectoris ([address] Anxiety; ASHD (arteriosclerotic heart disease) (2014); Coronary artery disease; Coronary atherosclerosis due to calcified coronary lesion (CODE) (5/23/2016); Diverticulitis; Duodenal ulcer; DVT (deep venous thrombosis) ([address] GERD (gastroesophageal reflux disease); HTN (hypertension); Hypercholesterolemia; Hypothyroidism; Kidney stones; Myositis; Personal history of DVT (deep vein thrombosis) (11/18/2016); Protein C deficiency ([address] Rhabdomyolysis (2018); Sleep apnea; 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 ([address] and Varicella. Mr. Denice Lee  has a past surgical history that includes hx colonoscopy (2013); hx wisdom teeth extraction (1980's); hx ureterolithotomy (Right, 2013); and hx heart catheterization (2015). Social History/Living Environment:  
  
 
Social History Social History  Marital status:  Spouse name: N/A  
 Number of children: N/A  
 Years of education: N/A Occupational History  Trainer Unknown [employer] Social History Main Topics  Smoking status: Former Smoker Quit date: 5/2/2006  Smokeless tobacco: Former User  Alcohol use 0.5 oz/week 1 Standard drinks or equivalent per week Comment: ocass  Drug use: No  
 Sexual activity: Not on file Other Topics Concern  Not on file Social History Narrative Prior Level of Function/Work/Activity: 
Independent Active Ambulatory Problems Diagnosis Date Noted  Hypercholesterolemia  Sleep apnea  Primary hypothyroidism  GERD (gastroesophageal reflux disease)  ASHD (arteriosclerotic heart disease)  Coronary atherosclerosis due to calcified coronary lesion (CODE) 05/23/2016  Personal history of DVT (deep vein thrombosis) 11/18/2016  
 HTN (hypertension)  Obesity, morbid ([address] 02/27/2018  Coronary artery disease  DVT (deep venous thrombosis) ([address]  Elevated liver enzymes 03/01/2018  Elevated CK 03/01/2018  Rhabdomyolysis 01/01/2018  Myositis  Elevated random blood glucose level 06/18/2018  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[address] 06/18/2018  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 ([address] Resolved Ambulatory Problems Diagnosis Date Noted  DVT (deep venous thrombosis) ([address] Past Medical History:  
Diagnosis Date  Angina pectoris ([address]  Anxiety  ASHD (arteriosclerotic heart disease) 2014  Coronary artery disease  Coronary atherosclerosis due to calcified coronary lesion (CODE) 5/23/2016  Diverticulitis  Duodenal ulcer  DVT (deep venous thrombosis) ([address]  GERD (gastroesophageal reflux disease)  HTN (hypertension)  Hypercholesterolemia  Hypothyroidism  Kidney stones  Myositis  Personal history of DVT (deep vein thrombosis) 11/18/2016  Protein C deficiency ([address]  Rhabdomyolysis 2018  Sleep apnea  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 ([address]  Varicella Note: Patient denies any increase of symptoms with cough, sneeze or valsalva. Patient denies any saddle paresthesia or bowel/bladder deficits. Personal Factors:   
      Sex:  male Age:  61 y.o. Current Medications:   
Current Outpatient Prescriptions:  
  ketoconazole (NIZORAL) 2 % topical cream, Apply  to affected area daily. , Disp: , Rfl:  
  methylPREDNISolone (MEDROL) 32 mg tablet, Take 1.5 tablets by mouth daily, Disp: 45 Tab, Rfl: 1 
  nitroglycerin (NITROSTAT) 0.4 mg SL tablet, 1 Tab by SubLINGual route every five (5) minutes as needed for Chest Pain., Disp: 25 Tab, Rfl: 3 
  amLODIPine (NORVASC) 5 mg tablet, Take 1 Tab by mouth daily. , Disp: 90 Tab, Rfl: 3 
  lisinopril-hydroCHLOROthiazide (PRINZIDE, ZESTORETIC) 10-12.5 mg per tablet, Take 1 Tab by mouth daily. , Disp: 90 Tab, Rfl: 3 
  metFORMIN (GLUCOPHAGE) 500 mg tablet, Take 2 Tabs by mouth two (2) times daily (with meals). , Disp: 360 Tab, Rfl: 3 
  alcohol swabs (ALCOHOL PREP PADS) padm, Use with insulin injections and to check glucose 4 times daily, E09.9, Disp: 400 Pad, Rfl: 3 
  DENZEL PEN NEEDLE 32 gauge x 5/32\" ndle, Use with insulin up to 4 times daily, E09.9, Disp: 400 Pen Needle, Rfl: 3 
  UNITHROID 112 mcg tablet, Take 2 tabs PO daily on empty stomach, ANDRADE 1, Disp: 180 Tab, Rfl: 3 
  NOVOLOG FLEXPEN U-100 INSULIN 100 unit/mL inpn, Use with correction 2/50>150, max daily dose 20 units, Disp: 6 Pen, Rfl: 3 
  magic mouthwash solution, Magic mouth wash  Maalox Lidocaine 2% viscous  Diphenhydramine oral solution  Nystatin                                    pharmacy to mix equal portions of ingredients to a total volume as indicated in the dispense amount. Swish and swallow 1 tsp qid, Disp: 360 mL, Rfl: 1 
  azaTHIOprine (IMURAN) 50 mg tablet, Take 2 Tabs by mouth two (2) times a day., Disp: 360 Tab, Rfl: 1   DULoxetine (CYMBALTA) 30 mg capsule, Take 1 Cap by mouth daily. , Disp: 90 Cap, Rfl: 1 
  buPROPion SR (WELLBUTRIN, ZYBAN) 200 mg SR tablet, Take 1 Tab by mouth two (2) times a day., Disp: 180 Tab, Rfl: 1 
  metoprolol succinate (TOPROL-XL) 25 mg XL tablet, Take 1 Tab by mouth daily. , Disp: 90 Tab, Rfl: 1 
  warfarin (JANTOVEN) 5 mg tablet, Take 1 Tab by mouth daily. , Disp: 90 Tab, Rfl: 1 
  Prothrombin Time Test Strips (COAGUCHEK XS) strp, Test 1-4 times per month as directed, Disp: 48 Strip, Rfl: 1 
  fexofenadine (ALLEGRA) 180 mg tablet, Take 180 mg by mouth daily. , Disp: , Rfl:  
  aspirin 81 mg tablet, Take 81 mg by mouth daily. , Disp: , Rfl:  
  Omega-3-DHA-EPA-Fish Oil (FISH OIL) 1,000 (120-180) mg Cap, Take 3 Caps by mouth daily. , Disp: , Rfl:   
 
Date Last Reviewed:  2018 EXAMINATION:  
Observation/Orthostatic Postural Assessment:   
      *Rounded shoulders Palpation:   
      Nothing tender and no complaints of tenderness. ROM:   
       
AROM/PROM Joint: Eval Date: 18  Re-Assess Date:  Re-Assess Date: Active ROM RIGHT LEFT RIGHT LEFT RIGHT LEFT Knee Extension 0 0 Knee Flexion [facility] Hip Flexion 40  60 Hip Abduction Lumbar Flexion 100% Lumbar Extension 100% Lumbar Side-bending 100% 100% Lumbar Rotation 90% 90% Strength:   
 Eval Date: 18  Re-Assess Date:  Re-Assess Date:   
  RIGHT LEFT RIGHT LEFT RIGHT LEFT Knee Flexion  4+/5 4+/5 Knee Extension (L3, L4) 4/5 4/5 Hip Flexion (L1, L2) 4/5 4/5 Hip Abduction (L5, S1) 4/5 4/5 Ankle Dorsiflexion  5/5 5/5 Great Toe Extension (L5) Single leg stance right/left: 2 seconds bilaterally EO 
            Deep squat: Can perform but off balance. Ham 90/90: 
 RIGHT LE: 40 LEFT LE: 60 
 
 
Neurological Screen:  
 RADIATING SYMPTOMS?: YES/NO*--NONE Functional Mobility:  Affecting participation in basic ADLs and functional tasks.  cardiopulmonary endurance. Balance: Tool Used: Timed Up and Go (TUG) Score:  Initial: 20 seconds Most Recent: X seconds (Date: -- ) Interpretation of Score: The test measures, in seconds, the time taken by an individual to stand up from a standard arm chair (seat height 46 cm [18 in], arm height 65 cm [25.6 in]), walk a distance of 3 meters (118 in, approx 10 ft), turn, walk back to the chair and sit down. If the individual takes longer than 14 seconds to complete TUG, this indicates risk for falls. Score 7 7.5-10.5 11-14 14.5-17.5 18-21 21.5-24.5 25+ Modifier CH CI CJ CK CL CM CN  
 
? Mobility - Walking and Moving Around:  
  - CURRENT STATUS: CL - 60%-79% impaired, limited or restricted  - GOAL STATUS: CK - 40%-59% impaired, limited or restricted  - D/C STATUS:  ---------------To be determined--------------- Medical Necessity:  
· Skilled intervention continues to be required due to decreased cardiopulmonary endurance affecting QOL and overall health and ability to perform ADLs. Reason for Services/Other Comments: 
· Patient continues to require skilled intervention due to  above deficits affecting participation in basic ADLs and overall functional tolerance. TREATMENT:  
(In addition to Assessment/Re-Assessment sessions the following treatments were rendered) THERAPEUTIC EXERCISE: (38 minutes):  Exercises per grid below to improve mobility, strength and balance. Required minimal visual and verbal cues to promote proper body alignment and promote proper body posture. Progressed resistance, range and complexity of movement as indicated. Date: 
7.23.18 Date: 
8.1.18 Date: Activity/Exercise Parameters Parameters Parameters Marching 10 10x E Heel Raises 10 10x standing Standing Hip Abduction 10 10x Standing Hip Extension 10 10x Minisquats 10 10x3 sit to stand NuStep  Level 3 5 minutes. Gastroc Stretch   30\"x3 Shuttle  50# 2 minutes Stairs  3x Treatment/Session Assessment:  Mary Salinas verbalized understanding of role of PT and POC.   Progressing strength and flexibility slowly as he is very deconditioned. · Pain/ Symptoms: Initial:   0/10 Post Session:  0/10 · Compliance with Program/Exercises: Will assess as treatment progresses. · Recommendations/Intent for next treatment session: \"Next visit will focus on advancements to more challenging activities\". Future Appointments Date Time Provider Jagjit Rosas 8/3/2018 1:00 PM Dane Greenwood DPT [facility]  
8/8/2018 4:00 PM Dane Greenwood DPT [facility]  
8/10/2018 8:45 AM Dane Greenwood DPT [facility]  
8/13/2018 9:30 AM Emily Rodriguez MD [facility]  
8/14/2018 7:15 AM GVLLE NUCLEAR STRESS [facility]  
8/15/2018 7:15 AM GVLLE NUCLEAR STRESS [facility]  
8/15/2018 4:00 PM Dane Greenwood DPT [facility]  
8/17/2018 8:45 AM Dane Greenwood DPT [facility]  
8/17/2018 9:30 AM EBEN NURSE ONLY [facility] EBEN EBEN  
8/17/2018 10:00 AM Nakul Schroeder PA-C END [facility]  
8/20/2018 4:00 PM [facility]  
8/23/2018 7:30 AM GVLLE ECHO 21 [facility]  
8/23/2018 12:00 PM Primus Cogan, MD [facility]  
8/29/2018 4:00 PM Dane Greenwood DPT [facility]  
8/31/2018 8:45 AM Dane Greenwood DPT [facility]  
8/31/2018 12:30 PM Kita Amaro MD [facility]  
9/17/2018 2:30 PM Kobe Celis [facility]  
9/21/2018 9:45 AM PP PFT LAB [facility] PP PP Total Treatment Duration: 38 minutes Rx PT Patient Time In/Time Out Time In: 1663 Time Out: 1600 Dane Greenwood DPT

## 2018-08-03 ENCOUNTER — HOSPITAL ENCOUNTER (OUTPATIENT)
Dept: PHYSICAL THERAPY | Age: 58
Discharge: HOME | End: 2018-08-03
Attending: INTERNAL MEDICINE
Payer: COMMERCIAL

## 2018-08-03 PROCEDURE — 97110 THERAPEUTIC EXERCISES: CPT

## 2018-08-03 NOTE — THERAPY EVALUATION
Sully Cason : 1960 Payor: Cameron Reeder / Plan: [place] [facility] / Product Type: PPO /  [address] at [facility] [address] [facility], [address] [facility], [facility], [id number] [place] [address] Road [phone]   [phone] OUTPATIENT PHYSICAL THERAPY:Daily Note 8/3/2018  Visit # 3 ICD-10: Treatment Diagnosis:  
     
  
  
  
Unsteadiness on feet (R26.81) Polymyositis ([facility] [address] [C36.06] Muscle weakness (generalized) [M62.81] Precautions/Allergies:  
Review of patient's allergies indicates no known allergies. Fall Risk Score: 1 (? 5 = High Risk) MD Orders: Eval and Treat  MEDICAL/REFERRING DIAGNOSIS: 
Polymyositis DATE OF ONSET: Chronic REFERRING PHYSICIAN: Turner Gonzalez MD 
RETURN PHYSICIAN APPOINTMENT: TBD by Sully Cason INITIAL ASSESSMENT:  Mr. Sully Cason has attended 1 physical therapy session including initial evaluation. Sully Cason has been experiencing mm weakness and decreased cardiopulmonary endurance with concomitant decreased flexibility, decreased postural and core strength, decreased functional tolerance. Sully Cason will benefit from skilled PT (medically necessary) to address above deficits affecting participation in basic ADLs and overall functional tolerance. Manual techniques (stretching, joint mobilizations, soft tissue mobilization/myofascial release), postural exercises/education, therapeutic techniques/activities, and HEP will be performed as appropriate addressing eBau Viera's current condition. PROBLEM LIST (Impacting functional limitations): 1. Decreased Strength 2. Decreased ADL/Functional Activities 3. Decreased Transfer Abilities 4. Decreased Ambulation Ability/Technique 5. Decreased Balance 6. Increased Pain 7. Decreased Activity Tolerance 8. Increased Fatigue 9. Increased Shortness of Breath 10. Decreased Flexibility/Joint Mobility 11.  Decreased Sawyer with Home Exercise Program INTERVENTIONS PLANNED: 
1. Balance Exercise 2. Bed Mobility 3. Cold 4. Cryotherapy 5. Electrical Stimulation 6. Family Education 7. Gait Training 8. Heat 9. Home Exercise Program (HEP) 10. Manual Therapy 11. Neuromuscular Re-education/Strengthening 12. Range of Motion (ROM) 13. Therapeutic Activites 14. Therapeutic Exercise/Strengthening 15. Transfer Training TREATMENT PLAN: 
Effective Dates: 7.23.18 TO 10/21/2018 (90 days). Frequency/Duration: 2 times a week for 90 Days GOALS: (Goals have been discussed and agreed upon with patient.) Short Term Goals 4 weeks 1. Jacky Roblero will be independent with HEP 2. Jacky Roblero will participate in LE stretching program to increase flexibility 3. Jacky Roblero will be able to ambulate 10 minutes with no rest break and O2 level >90%. 4. Jacky Roblero will be able to stand with good posture and no SOB or [place] for 5 minutes improving current level of function. 5. Jacky Roblero will participate in LE strengthening program with weights as appropriate to help with gait and elevations 6. Jacky Roblero will participate in static and dynamic balance activities to decrease the risk for falls 7. Jacky Roblero will tolerate manual therapy/joint mobilizations/soft tissue to increase ROM and decrease pain 8. Long Term Goals 8 weeks 1. Jacky Roblero will improve TUG to <=12 seconds showing improved ambulation safety. 2. Jacky Roblero will demonstrate 5/5 LE strength on manual muscle testing 3. Jacky Roblero will be able to perform SLS >5 seconds bilaterally to help with gait and improve balance Rehabilitation Potential For Stated Goals: Good Regarding Víctor Viera's therapy, I certify that the treatment plan above will be carried out by a therapist or under their direction. Thank you for this referral, 
Mearl Cranker, DPT Referring Physician Signature: Koko Gaines MD              Date HISTORY:  
History of Present Injury/Illness (Reason for Referral): Mike Bernard has been experiencing muscle weakness ---- Mike Bernard states last year he started getting weakness in arms and legs. He lost ROM of R arm and couldn't raise it up to his head. He finally was encouraged to go to MD by wife. Mike Bernard first thought it was Myasthenias Gravis; however, after bloodwork it was not. Blood work revealed Polymyositis. He's been on steroids since February---he got to the point where he could not get out of bed in March. He has hx of Rhabdomyolysis that required hospitalization. He has since gotten stronger,  But is looking to get in better shape. His voice has been affected as well. .   
 
 
-Present symptoms/complaints (on day of evaluation) Pain Scale: · Current: 0/10 · Best: 0/10 · Worst: 0/10 Past Medical History/Comorbidities:  
Mr. Minh Renee  has a past medical history of Angina pectoris ([address] Anxiety; ASHD (arteriosclerotic heart disease) (2014); Coronary artery disease; Coronary atherosclerosis due to calcified coronary lesion (CODE) (5/23/2016); Diverticulitis; Duodenal ulcer; DVT (deep venous thrombosis) ([address] GERD (gastroesophageal reflux disease); HTN (hypertension); Hypercholesterolemia; Hypothyroidism; Kidney stones; Myositis; Personal history of DVT (deep vein thrombosis) (11/18/2016); Protein C deficiency ([address] Rhabdomyolysis (2018); Sleep apnea; 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 ([address] and Varicella. Mr. Minh Renee  has a past surgical history that includes hx colonoscopy (2013); hx wisdom teeth extraction (1980's); hx ureterolithotomy (Right, 2013); and hx heart catheterization (2015). Social History/Living Environment:  
  
 
Social History Social History  Marital status:  Spouse name: N/A  
 Number of children: N/A  
 Years of education: N/A Occupational History  Trainer Unknown [employer] Social History Main Topics  Smoking status: Former Smoker Quit date: 5/2/2006  Smokeless tobacco: Former User  Alcohol use 0.5 oz/week 1 Standard drinks or equivalent per week Comment: ocass  Drug use: No  
 Sexual activity: Not on file Other Topics Concern  Not on file Social History Narrative Prior Level of Function/Work/Activity: 
Independent Active Ambulatory Problems Diagnosis Date Noted  Hypercholesterolemia  Sleep apnea  Primary hypothyroidism  GERD (gastroesophageal reflux disease)  ASHD (arteriosclerotic heart disease)  Coronary atherosclerosis due to calcified coronary lesion (CODE) 05/23/2016  Personal history of DVT (deep vein thrombosis) 11/18/2016  
 HTN (hypertension)  Obesity, morbid ([address] 02/27/2018  Coronary artery disease  DVT (deep venous thrombosis) ([address]  Elevated liver enzymes 03/01/2018  Elevated CK 03/01/2018  Rhabdomyolysis 01/01/2018  Myositis  Elevated random blood glucose level 06/18/2018  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[address] 06/18/2018  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 ([address] Resolved Ambulatory Problems Diagnosis Date Noted  DVT (deep venous thrombosis) ([address] Past Medical History:  
Diagnosis Date  Angina pectoris ([address]  Anxiety  ASHD (arteriosclerotic heart disease) 2014  Coronary artery disease  Coronary atherosclerosis due to calcified coronary lesion (CODE) 5/23/2016  Diverticulitis  Duodenal ulcer  DVT (deep venous thrombosis) ([address]  GERD (gastroesophageal reflux disease)  HTN (hypertension)  Hypercholesterolemia  Hypothyroidism  Kidney stones  Myositis  Personal history of DVT (deep vein thrombosis) 11/18/2016  Protein C deficiency ([address]  Rhabdomyolysis 2018  Sleep apnea  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 ([address]  Varicella Note: Patient denies any increase of symptoms with cough, sneeze or valsalva. Patient denies any saddle paresthesia or bowel/bladder deficits. Personal Factors:   
      Sex:  male Age:  61 y.o. Current Medications:   
Current Outpatient Prescriptions:  
  ketoconazole (NIZORAL) 2 % topical cream, Apply  to affected area daily. , Disp: , Rfl:  
  methylPREDNISolone (MEDROL) 32 mg tablet, Take 1.5 tablets by mouth daily, Disp: 45 Tab, Rfl: 1 
  nitroglycerin (NITROSTAT) 0.4 mg SL tablet, 1 Tab by SubLINGual route every five (5) minutes as needed for Chest Pain., Disp: 25 Tab, Rfl: 3 
  amLODIPine (NORVASC) 5 mg tablet, Take 1 Tab by mouth daily. , Disp: 90 Tab, Rfl: 3 
  lisinopril-hydroCHLOROthiazide (PRINZIDE, ZESTORETIC) 10-12.5 mg per tablet, Take 1 Tab by mouth daily. , Disp: 90 Tab, Rfl: 3 
  metFORMIN (GLUCOPHAGE) 500 mg tablet, Take 2 Tabs by mouth two (2) times daily (with meals). , Disp: 360 Tab, Rfl: 3 
  alcohol swabs (ALCOHOL PREP PADS) padm, Use with insulin injections and to check glucose 4 times daily, E09.9, Disp: 400 Pad, Rfl: 3 
  DENZEL PEN NEEDLE 32 gauge x 5/32\" ndle, Use with insulin up to 4 times daily, E09.9, Disp: 400 Pen Needle, Rfl: 3 
  UNITHROID 112 mcg tablet, Take 2 tabs PO daily on empty stomach, ANDRADE 1, Disp: 180 Tab, Rfl: 3 
  NOVOLOG FLEXPEN U-100 INSULIN 100 unit/mL inpn, Use with correction 2/50>150, max daily dose 20 units, Disp: 6 Pen, Rfl: 3 
  magic mouthwash solution, Magic mouth wash  Maalox Lidocaine 2% viscous  Diphenhydramine oral solution  Nystatin                                    pharmacy to mix equal portions of ingredients to a total volume as indicated in the dispense amount. Swish and swallow 1 tsp qid, Disp: 360 mL, Rfl: 1 
  azaTHIOprine (IMURAN) 50 mg tablet, Take 2 Tabs by mouth two (2) times a day., Disp: 360 Tab, Rfl: 1   DULoxetine (CYMBALTA) 30 mg capsule, Take 1 Cap by mouth daily. , Disp: 90 Cap, Rfl: 1 
  buPROPion SR (WELLBUTRIN, ZYBAN) 200 mg SR tablet, Take 1 Tab by mouth two (2) times a day., Disp: 180 Tab, Rfl: 1 
  metoprolol succinate (TOPROL-XL) 25 mg XL tablet, Take 1 Tab by mouth daily. , Disp: 90 Tab, Rfl: 1 
  warfarin (JANTOVEN) 5 mg tablet, Take 1 Tab by mouth daily. , Disp: 90 Tab, Rfl: 1 
  Prothrombin Time Test Strips (COAGUCHEK XS) strp, Test 1-4 times per month as directed, Disp: 48 Strip, Rfl: 1 
  fexofenadine (ALLEGRA) 180 mg tablet, Take 180 mg by mouth daily. , Disp: , Rfl:  
  aspirin 81 mg tablet, Take 81 mg by mouth daily. , Disp: , Rfl:  
  Omega-3-DHA-EPA-Fish Oil (FISH OIL) 1,000 (120-180) mg Cap, Take 3 Caps by mouth daily. , Disp: , Rfl:   
 
Date Last Reviewed:  8/3/2018 EXAMINATION:  
Observation/Orthostatic Postural Assessment:   
      *Rounded shoulders Palpation:   
      Nothing tender and no complaints of tenderness. ROM:   
       
AROM/PROM Joint: Eval Date: 18  Re-Assess Date:  Re-Assess Date: Active ROM RIGHT LEFT RIGHT LEFT RIGHT LEFT Knee Extension 0 0 Knee Flexion [facility] Hip Flexion 40  60 Hip Abduction Lumbar Flexion 100% Lumbar Extension 100% Lumbar Side-bending 100% 100% Lumbar Rotation 90% 90% Strength:   
 Eval Date: 18  Re-Assess Date:  Re-Assess Date:   
  RIGHT LEFT RIGHT LEFT RIGHT LEFT Knee Flexion  4+/5 4+/5 Knee Extension (L3, L4) 4/5 4/5 Hip Flexion (L1, L2) 4/5 4/5 Hip Abduction (L5, S1) 4/5 4/5 Ankle Dorsiflexion  5/5 5/5 Great Toe Extension (L5) Single leg stance right/left: 2 seconds bilaterally EO 
            Deep squat: Can perform but off balance. Ham 90/90: 
 RIGHT LE: 40 LEFT LE: 60 
 
 
Neurological Screen:  
 RADIATING SYMPTOMS?: YES/NO*--NONE Functional Mobility:  Affecting participation in basic ADLs and functional tasks.  cardiopulmonary endurance. Balance: Tool Used: Timed Up and Go (TUG) Score:  Initial: 20 seconds Most Recent: X seconds (Date: -- ) Interpretation of Score: The test measures, in seconds, the time taken by an individual to stand up from a standard arm chair (seat height 46 cm [18 in], arm height 65 cm [25.6 in]), walk a distance of 3 meters (118 in, approx 10 ft), turn, walk back to the chair and sit down. If the individual takes longer than 14 seconds to complete TUG, this indicates risk for falls. Score 7 7.5-10.5 11-14 14.5-17.5 18-21 21.5-24.5 25+ Modifier CH CI CJ CK CL CM CN  
 
? Mobility - Walking and Moving Around:  
  - CURRENT STATUS: CL - 60%-79% impaired, limited or restricted  - GOAL STATUS: CK - 40%-59% impaired, limited or restricted  - D/C STATUS:  ---------------To be determined--------------- Medical Necessity:  
· Skilled intervention continues to be required due to decreased cardiopulmonary endurance affecting QOL and overall health and ability to perform ADLs. Reason for Services/Other Comments: 
· Patient continues to require skilled intervention due to  above deficits affecting participation in basic ADLs and overall functional tolerance. TREATMENT:  
(In addition to Assessment/Re-Assessment sessions the following treatments were rendered) THERAPEUTIC EXERCISE: (39 minutes):  Exercises per grid below to improve mobility, strength and balance. Required minimal visual and verbal cues to promote proper body alignment and promote proper body posture. Progressed resistance, range and complexity of movement as indicated. Date: 
7.23.18 Date: 
8.1.18 Date: 
8/3/18 Activity/Exercise Parameters Parameters Parameters Marching 10 10x E 20E Heel Raises 10 10x standing 25 Standing Hip Abduction 10 10x 10x2 Standing Hip Extension 10 10x 10x2 Minisquats 10 10x3 sit to stand  10x3 sit to stand yellow band NuStep  Level 3 5 minutes. Level 6 6 minutes. Gastroc Stretch   30\"x3 30\"x3 Shuttle  50# 2 minutes 100# Yellow UE  
20x Stairs  3x 3x Rows   Blue 2x10   SLS Treatment/Session Assessment:  Uriel Quiroz verbalized understanding of role of PT and POC. Progressing strength and flexibility slowly as he is very deconditioned. · Pain/ Symptoms: Initial:   0/10 Post Session:  0/10 · Compliance with Program/Exercises: Will assess as treatment progresses. · Recommendations/Intent for next treatment session: \"Next visit will focus on advancements to more challenging activities\". Future Appointments Date Time Provider Jagjit Rosas 8/8/2018 4:00 PM Sonia Corbett DPT [facility]  
8/10/2018 8:45 AM [facility]  
8/13/2018 9:30 AM Christine Marmolejo MD [facility]  
8/14/2018 7:15 AM GVLLE NUCLEAR STRESS [facility]  
8/15/2018 7:15 AM GVLLE NUCLEAR STRESS [facility]  
8/15/2018 4:00 PM Sonia Corbett DPT [facility]  
8/17/2018 8:45 AM Sonia Corbett [facility]  
8/17/2018 9:30 AM EBEN NURSE ONLY [facility] EBEN EBEN  
8/17/2018 10:00 AM Kasandra Keith PA-C [facility]  
8/20/2018 4:00 PM Sonia Corbett [facility]  
8/23/2018 7:30 AM GVLLE ECHO 21 [facility]  
8/23/2018 12:00 PM Julius Strange MD [facility]  
8/29/2018 4:00 PM Sonia Corbett DPT [facility]  
8/31/2018 8:45 AM Sonia Corbett DPT [facility]  
8/31/2018 12:30 PM James Pro MD [facility]  
9/17/2018 2:30 PM Julia Celis [facility]  
9/21/2018 9:45 AM PP PFT LAB [facility] PP PP Total Treatment Duration: 39 minutes Rx PT Patient Time In/Time Out Time In: 4431 Time Out: 1705 Sonia Corbett DPT

## 2018-08-08 ENCOUNTER — HOSPITAL ENCOUNTER (OUTPATIENT)
Dept: PHYSICAL THERAPY | Age: 58
Discharge: HOME | End: 2018-08-08
Attending: INTERNAL MEDICINE
Payer: COMMERCIAL

## 2018-08-08 PROCEDURE — 97110 THERAPEUTIC EXERCISES: CPT

## 2018-08-08 NOTE — PROGRESS NOTES
Chloe Garcia  : 1960      Payor: Queenie Haji / Plan: [place] [facility] / Product Type: PPO /  [id number] at [address] [place] [address]. [facility], [address], [facility], [address] [place] [address]  [phone]   [phone]       OUTPATIENT PHYSICAL THERAPY:Daily Note 2018  Visit # 4   ICD-10: Treatment Diagnosis:                  Unsteadiness on feet (R26.81)    Polymyositis (HCC) [M33.20]  Muscle weakness (generalized) [M62.81]       Precautions/Allergies:   Review of patient's allergies indicates no known allergies. Fall Risk Score: 1 (? 5 = High Risk)  MD Orders: Eval and Treat  MEDICAL/REFERRING DIAGNOSIS:  Polymyositis   DATE OF ONSET: Chronic  REFERRING PHYSICIAN: Yvonne Albert MD  RETURN PHYSICIAN APPOINTMENT: TBD by Chloe Garcia      INITIAL ASSESSMENT:  Mr. Chloe Garcia has attended 1 physical therapy session including initial evaluation. Chloe Garcia has been experiencing mm weakness and decreased cardiopulmonary endurance with concomitant decreased flexibility, decreased postural and core strength, decreased functional tolerance. Chloe Garcia will benefit from skilled PT (medically necessary) to address above deficits affecting participation in basic ADLs and overall functional tolerance. Manual techniques (stretching, joint mobilizations, soft tissue mobilization/myofascial release), postural exercises/education, therapeutic techniques/activities, and HEP will be performed as appropriate addressing Jaime Viera's current condition. PROBLEM LIST (Impacting functional limitations):  1. Decreased Strength  2. Decreased ADL/Functional Activities  3. Decreased Transfer Abilities  4. Decreased Ambulation Ability/Technique  5. Decreased Balance  6. Increased Pain  7. Decreased Activity Tolerance  8. Increased Fatigue  9. Increased Shortness of Breath  10. Decreased Flexibility/Joint Mobility  11.  Decreased [place] with Home Exercise Program INTERVENTIONS PLANNED:  1. Balance Exercise  2. Bed Mobility  3. Cold  4. Cryotherapy  5. Electrical Stimulation  6. Family Education  7. Gait Training  8. Heat  9. Home Exercise Program (HEP)  10. Manual Therapy  11. Neuromuscular Re-education/Strengthening  12. Range of Motion (ROM)  13. Therapeutic Activites  14. Therapeutic Exercise/Strengthening  15. Transfer Training   TREATMENT PLAN:  Effective Dates: 7.23.18 TO 10/21/2018 (90 days). Frequency/Duration: 2 times a week for 90 Days  GOALS: (Goals have been discussed and agreed upon with patient.)  Short Term Goals 4 weeks   1. Barry Padilla will be independent with HEP  2. Barry Padilla will participate in LE stretching program to increase flexibility  3. Barry Padilla will be able to ambulate 10 minutes with no rest break and O2 level >90%. 4. Barry Padilla will be able to stand with good posture and no SOB or RUTHERFROD for 5 minutes improving current level of function. 5. Barry Padilla will participate in LE strengthening program with weights as appropriate to help with gait and elevations  6. Barry Padilla will participate in static and dynamic balance activities to decrease the risk for falls  7. Barry Padilla will tolerate manual therapy/joint mobilizations/soft tissue to increase ROM and decrease pain  8. Long Term Goals 8 weeks   1. Barry Padilla will improve TUG to <=12 seconds showing improved ambulation safety. 2. Barry Padilla will demonstrate 5/5 LE strength on manual muscle testing  3. Barry Padilla will be able to perform SLS >5 seconds bilaterally to help with gait and improve balance  Rehabilitation Potential For Stated Goals: Good  Regarding Sonal Viera's therapy, I certify that the treatment plan above will be carried out by a therapist or under their direction.   Thank you for this referral,  Gilmar Brock DPT     Referring Physician Signature: Silvio Tidwell MD              Date                    HISTORY:   History of Present Injury/Illness (Reason for Referral):  Libertad Cortes has been experiencing muscle weakness ---- Libertad Cortes states last year he started getting weakness in arms and legs. He lost ROM of R arm and couldn't raise it up to his head. He finally was encouraged to go to MD by wife. Libertad Cortes first thought it was Myasthenias Gravis; however, after bloodwork it was not. Blood work revealed Polymyositis. He's been on steroids since February---he got to the point where he could not get out of bed in March. He has hx of Rhabdomyolysis that required hospitalization. He has since gotten stronger,  But is looking to get in better shape. His voice has been affected as well. .        -Present symptoms/complaints (on day of evaluation)  Pain Scale:  · Current: 0/10  · Best: 0/10  · Worst: 0/10      Past Medical History/Comorbidities:   Mr. Reynaldo Gonzalez  has a past medical history of Angina pectoris ([address] Anxiety; ASHD (arteriosclerotic heart disease) (2014); Coronary artery disease; Coronary atherosclerosis due to calcified coronary lesion (CODE) (5/23/2016); Diverticulitis; Duodenal ulcer; DVT (deep venous thrombosis) ([address] GERD (gastroesophageal reflux disease); HTN (hypertension); Hypercholesterolemia; Hypothyroidism; Kidney stones; Myositis; Personal history of DVT (deep vein thrombosis) (11/18/2016); Protein C deficiency ([address] Rhabdomyolysis (2018); Sleep apnea; 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 ([address] and Varicella. Mr. Reynaldo Gonzalez  has a past surgical history that includes hx colonoscopy (2013); hx wisdom teeth extraction (1980's); hx ureterolithotomy (Right, 2013); and hx heart catheterization (2015).   Social History/Living Environment:        Social History     Social History    Marital status:      Spouse name: N/A    Number of children: N/A    Years of education: N/A     Occupational History    Trainer Unknown     3[employer]     Social History Main Topics    Smoking status: Former Smoker     Quit date: 5/2/2006    Smokeless tobacco: Former User    Alcohol use 0.5 oz/week     1 Standard drinks or equivalent per week      Comment: ocass    Drug use: No    Sexual activity: Not on file     Other Topics Concern    Not on file     Social History Narrative     Prior Level of Function/Work/Activity:  Independent    Active Ambulatory Problems     Diagnosis Date Noted    Hypercholesterolemia     Sleep apnea     Primary hypothyroidism     GERD (gastroesophageal reflux disease)     ASHD (arteriosclerotic heart disease)     Coronary atherosclerosis due to calcified coronary lesion (CODE) 05/23/2016    Personal history of DVT (deep vein thrombosis) 11/18/2016    HTN (hypertension)     Obesity, morbid ([address] 02/27/2018    Coronary artery disease     DVT (deep venous thrombosis) (HCC)     Elevated liver enzymes 03/01/2018    Elevated CK 03/01/2018    Rhabdomyolysis 01/01/2018    Myositis     Elevated random blood glucose level 06/18/2018    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[address] 06/18/2018    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 ([address]      Resolved Ambulatory Problems     Diagnosis Date Noted    DVT (deep venous thrombosis) (HCC)      Past Medical History:   Diagnosis Date    Angina pectoris ([address]     Anxiety     ASHD (arteriosclerotic heart disease) 2014    Coronary artery disease     Coronary atherosclerosis due to calcified coronary lesion (CODE) 5/23/2016    Diverticulitis     Duodenal ulcer     DVT (deep venous thrombosis) (HCC)     GERD (gastroesophageal reflux disease)     HTN (hypertension)     Hypercholesterolemia     Hypothyroidism     Kidney stones     Myositis     Personal history of DVT (deep vein thrombosis) 11/18/2016    Protein C deficiency ([address]     Rhabdomyolysis 2018    Sleep apnea     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 ([address]     Varicella      Note: Patient denies any increase of symptoms with cough, sneeze or valsalva. Patient denies any saddle paresthesia or bowel/bladder deficits. Personal Factors:          Sex:  male        Age:  61 y.o. Current Medications:    Current Outpatient Prescriptions:     ketoconazole (NIZORAL) 2 % topical cream, Apply  to affected area daily. , Disp: , Rfl:     methylPREDNISolone (MEDROL) 32 mg tablet, Take 1.5 tablets by mouth daily, Disp: 45 Tab, Rfl: 1    nitroglycerin (NITROSTAT) 0.4 mg SL tablet, 1 Tab by SubLINGual route every five (5) minutes as needed for Chest Pain., Disp: 25 Tab, Rfl: 3    amLODIPine (NORVASC) 5 mg tablet, Take 1 Tab by mouth daily. , Disp: 90 Tab, Rfl: 3    lisinopril-hydroCHLOROthiazide (PRINZIDE, ZESTORETIC) 10-12.5 mg per tablet, Take 1 Tab by mouth daily. , Disp: 90 Tab, Rfl: 3    metFORMIN (GLUCOPHAGE) 500 mg tablet, Take 2 Tabs by mouth two (2) times daily (with meals). , Disp: 360 Tab, Rfl: 3    alcohol swabs (ALCOHOL PREP PADS) padm, Use with insulin injections and to check glucose 4 times daily, E09.9, Disp: 400 Pad, Rfl: 3    DENZEL PEN NEEDLE 32 gauge x 5/32\" ndle, Use with insulin up to 4 times daily, E09.9, Disp: 400 Pen Needle, Rfl: 3    UNITHROID 112 mcg tablet, Take 2 tabs PO daily on empty stomach, ANDRADE 1, Disp: 180 Tab, Rfl: 3    NOVOLOG FLEXPEN U-100 INSULIN 100 unit/mL inpn, Use with correction 2/50>150, max daily dose 20 units, Disp: 6 Pen, Rfl: 3    magic mouthwash solution, Magic mouth wash  Maalox Lidocaine 2% viscous  Diphenhydramine oral solution  Nystatin                                    pharmacy to mix equal portions of ingredients to a total volume as indicated in the dispense amount. Swish and swallow 1 tsp qid, Disp: 360 mL, Rfl: 1    azaTHIOprine (IMURAN) 50 mg tablet, Take 2 Tabs by mouth two (2) times a day., Disp: 360 Tab, Rfl: 1    DULoxetine (CYMBALTA) 30 mg capsule, Take 1 Cap by mouth daily. , Disp: 90 Cap, Rfl: 1    buPROPion SR (WELLBUTRIN, ZYBAN) 200 mg SR tablet, Take 1 Tab by mouth two (2) times a day., Disp: 180 Tab, Rfl: 1    metoprolol succinate (TOPROL-XL) 25 mg XL tablet, Take 1 Tab by mouth daily. , Disp: 90 Tab, Rfl: 1    warfarin (JANTOVEN) 5 mg tablet, Take 1 Tab by mouth daily. , Disp: 90 Tab, Rfl: 1    Prothrombin Time Test Strips (COAGUCHEK XS) strp, Test 1-4 times per month as directed, Disp: 48 Strip, Rfl: 1    fexofenadine (ALLEGRA) 180 mg tablet, Take 180 mg by mouth daily. , Disp: , Rfl:     aspirin 81 mg tablet, Take 81 mg by mouth daily. , Disp: , Rfl:     Omega-3-DHA-EPA-Fish Oil (FISH OIL) 1,000 (120-180) mg Cap, Take 3 Caps by mouth daily. , Disp: , Rfl:      Date Last Reviewed:  2018   EXAMINATION:   Observation/Orthostatic Postural Assessment:          *Rounded shoulders  Palpation:          Nothing tender and no complaints of tenderness. ROM:            AROM/PROM         Joint: Eval Date: 18  Re-Assess Date:  Re-Assess Date:    Active ROM RIGHT LEFT RIGHT LEFT RIGHT LEFT   Knee Extension 0 0       Knee Flexion [facility]       Hip Flexion 40  60       Hip Abduction         Lumbar Flexion 100%        Lumbar Extension 100%        Lumbar Side-bending 100% 100%       Lumbar Rotation 90% 90%          Strength:     Eval Date: 18  Re-Assess Date:  Re-Assess Date:      RIGHT LEFT RIGHT LEFT RIGHT LEFT   Knee Flexion  4+/5 4+/5       Knee Extension (L3, L4) 4/5 4/5       Hip Flexion (L1, L2) 4/5 4/5       Hip Abduction (L5, S1) 4/5 4/5       Ankle Dorsiflexion  5/5 5/5       Great Toe Extension (L5)                      Single leg stance right/left: 2 seconds bilaterally EO              Deep squat: Can perform but off balance. Ham 90/90:   RIGHT LE: 40   LEFT LE: 60      Neurological Screen:    RADIATING SYMPTOMS?: YES/NO*--NONE  Functional Mobility:  Affecting participation in basic ADLs and functional tasks.  cardiopulmonary endurance. Balance:              Tool Used: Timed Up and Go (TUG)  Score:  Initial: 20 seconds Most Recent: X seconds (Date: -- )   Interpretation of Score: The test measures, in seconds, the time taken by an individual to stand up from a standard arm chair (seat height 46 cm [18 in], arm height 65 cm [25.6 in]), walk a distance of 3 meters (118 in, approx 10 ft), turn, walk back to the chair and sit down. If the individual takes longer than 14 seconds to complete TUG, this indicates risk for falls. Score 7 7.5-10.5 11-14 14.5-17.5 18-21 21.5-24.5 25+   Modifier CH CI CJ CK CL CM CN     ? Mobility - Walking and Moving Around:     - CURRENT STATUS: CL - 60%-79% impaired, limited or restricted    - GOAL STATUS: CK - 40%-59% impaired, limited or restricted    - D/C STATUS:  ---------------To be determined---------------      Medical Necessity:   · Skilled intervention continues to be required due to decreased cardiopulmonary endurance affecting QOL and overall health and ability to perform ADLs. Reason for Services/Other Comments:  · Patient continues to require skilled intervention due to  above deficits affecting participation in basic ADLs and overall functional tolerance. TREATMENT:   (In addition to Assessment/Re-Assessment sessions the following treatments were rendered)  THERAPEUTIC EXERCISE: (40 minutes):  Exercises per grid below to improve mobility, strength and balance. Required minimal visual and verbal cues to promote proper body alignment and promote proper body posture. Progressed resistance, range and complexity of movement as indicated. Date:  7.23.18 Date:  8.1.18 Date:  8/3/18 Date:  8/8/18   Activity/Exercise Parameters Parameters Parameters    Marching 10 10x E 20E    Heel Raises 10 10x standing 25 25x   Standing Hip Abduction 10 10x 10x2 10x2    Standing Hip Extension 10 10x 10x2 10x2    Minisquats 10 10x3 sit to stand  10x3 sit to stand yellow band 10x3 sit to stand yellow ball   NuStep  Level 3 5 minutes. Level 6 6 minutes. Level 8 3 minutes, level 3 3 minutes. Gastroc Stretch   30\"x3 30\"x3 30\"x3   Shuttle  50# 2 minutes 100#   Yellow UE   20x 125#      20x   Stairs  3x 3x 3x   Rows   Blue 2x10   SLS  Heavy red band 20x   Ambulation    2 laps. Sit to stand    10x2 red TB UE   Cone taps    Foam 10x E   Bridges    25x   SLR    2X10 B LE                            Treatment/Session Assessment:  Gildardo Aponte verbalized understanding of role of PT and POC. Progressing strength and flexibility slowly as he is very deconditioned. Added new exercises --- cues to take rest breaks and to work on breathing. · Pain/ Symptoms: Initial:   0/10 Post Session:  0/10 ·   Compliance with Program/Exercises: Will assess as treatment progresses. · Recommendations/Intent for next treatment session: \"Next visit will focus on advancements to more challenging activities\".     Future Appointments  Date Time Provider Jagjit Rosas   8/10/2018 8:45 AM Archana Mobley DPT [facility]   8/13/2018 9:30 AM Seda Verduzco MD [place] [facility]   8/14/2018 7:15 AM GVLLE NUCLEAR STRESS [facility]   8/15/2018 7:15 AM GVLLE NUCLEAR STRESS [facility]   8/15/2018 4:00 PM Archana Mobley DPT [facility]   8/17/2018 8:45 AM АЛЕКСАНДР ZieglerT [facility]   8/17/2018 9:30 AM EBEN NURSE ONLY [facility]EN EBEN   8/17/2018 10:00 AM Fred Keene PA-C END BS ENDO   8/20/2018 4:00 PM Archana Mobley DPT [facility]   8/23/2018 7:30 AM GVLLE ECHO [facility]   8/23/2018 12:00 PM Romayne Sevin, MD [place] [facility]   8/29/2018 4:00 PM Archana Mobley DPT [facility]   8/31/2018 8:45 AM Archana Mobley DPT [facility]   8/31/2018 12:30 PM Nakul Allan MD [facility]   9/17/2018 2:30 PM Byron Celis [facility]   9/21/2018 9:45 AM PP PFT LAB [facility] PP PP       Total Treatment Duration:40 minutes Rx  PT Patient Time In/Time Out  Time In: 1600  Time Out: 1200 [facility] [address] Madeline Bonilla, DPT

## 2018-08-10 ENCOUNTER — HOSPITAL ENCOUNTER (OUTPATIENT)
Dept: PHYSICAL THERAPY | Age: 58
Discharge: HOME | End: 2018-08-10
Attending: INTERNAL MEDICINE
Payer: COMMERCIAL

## 2018-08-10 PROCEDURE — 97110 THERAPEUTIC EXERCISES: CPT

## 2018-08-10 NOTE — PROGRESS NOTES
Ulysses Eaves  : 1960      Payor: Leena Arrington / Plan: [facility] / Product Type: PPO /  [address] [facility], [address], [place], [id number] [place] [address] Road  [phone]   [phone]       OUTPATIENT PHYSICAL THERAPY:Daily Note 8/10/2018  Visit # 5   ICD-10: Treatment Diagnosis:                  Unsteadiness on feet (R26.81)    Polymyositis (HCC) [M33.20]  Muscle weakness (generalized) [M62.81]       Precautions/Allergies:   Review of patient's allergies indicates no known allergies. Fall Risk Score: 1 (? 5 = High Risk)  MD Orders: Eval and Treat  MEDICAL/REFERRING DIAGNOSIS:  Polymyositis   DATE OF ONSET: Chronic  REFERRING PHYSICIAN: Ellie Ordonez MD  RETURN PHYSICIAN APPOINTMENT: TBD by Ulysses Paola      INITIAL ASSESSMENT:  Mr. Ulysses Eaves has attended 1 physical therapy session including initial evaluation. Ulysses Eaves has been experiencing mm weakness and decreased cardiopulmonary endurance with concomitant decreased flexibility, decreased postural and core strength, decreased functional tolerance. Ulysses Eaves will benefit from skilled PT (medically necessary) to address above deficits affecting participation in basic ADLs and overall functional tolerance. Manual techniques (stretching, joint mobilizations, soft tissue mobilization/myofascial release), postural exercises/education, therapeutic techniques/activities, and HEP will be performed as appropriate addressing Meli Viera's current condition. PROBLEM LIST (Impacting functional limitations):  1. Decreased Strength  2. Decreased ADL/Functional Activities  3. Decreased Transfer Abilities  4. Decreased Ambulation Ability/Technique  5. Decreased Balance  6. Increased Pain  7. Decreased Activity Tolerance  8. Increased Fatigue  9. Increased Shortness of Breath  10. Decreased Flexibility/Joint Mobility  11.  Decreased [place] with Home Exercise Program INTERVENTIONS PLANNED:  1. Balance Exercise  2. Bed Mobility  3. Cold  4. Cryotherapy  5. Electrical Stimulation  6. Family Education  7. Gait Training  8. Heat  9. Home Exercise Program (HEP)  10. Manual Therapy  11. Neuromuscular Re-education/Strengthening  12. Range of Motion (ROM)  13. Therapeutic Activites  14. Therapeutic Exercise/Strengthening  15. Transfer Training   TREATMENT PLAN:  Effective Dates: 7.23.18 TO 10/21/2018 (90 days). Frequency/Duration: 2 times a week for 90 Days  GOALS: (Goals have been discussed and agreed upon with patient.)  Short Term Goals 4 weeks   1. Coretta Singer will be independent with HEP  2. Coretta Singer will participate in LE stretching program to increase flexibility  3. Coretta Singer will be able to ambulate 10 minutes with no rest break and O2 level >90%. 4. Coretta Singer will be able to stand with good posture and no SOB or [place] for 5 minutes improving current level of function. 5. Coretta Singer will participate in LE strengthening program with weights as appropriate to help with gait and elevations  6. Coretta Singer will participate in static and dynamic balance activities to decrease the risk for falls  7. Coretta Singer will tolerate manual therapy/joint mobilizations/soft tissue to increase ROM and decrease pain  8. Long Term Goals 8 weeks   1. Coretta Singer will improve TUG to <=12 seconds showing improved ambulation safety. 2. Coretta Singer will demonstrate 5/5 LE strength on manual muscle testing  3. Coretta Singer will be able to perform SLS >5 seconds bilaterally to help with gait and improve balance  Rehabilitation Potential For Stated Goals: Good  Regarding Tamar Vahevickie Maximiliano's therapy, I certify that the treatment plan above will be carried out by a therapist or under their direction.   Thank you for this referral,  Bry James DPT     Referring Physician Signature: Rachael Cabrera MD              Date                    HISTORY:   History of Present Injury/Illness (Reason for Referral):  Robby Contreras has been experiencing muscle weakness ---- Robby Contreras states last year he started getting weakness in arms and legs. He lost ROM of R arm and couldn't raise it up to his head. He finally was encouraged to go to MD by wife. Robby Contreras first thought it was Myasthenias Gravis; however, after bloodwork it was not. Blood work revealed Polymyositis. He's been on steroids since February---he got to the point where he could not get out of bed in March. He has hx of Rhabdomyolysis that required hospitalization. He has since gotten stronger,  But is looking to get in better shape. His voice has been affected as well. .        -Present symptoms/complaints (on day of evaluation)  Pain Scale:  · Current: 0/10  · Best: 0/10  · Worst: 0/10      Past Medical History/Comorbidities:   Mr. Otis Ruiz  has a past medical history of Angina pectoris ([address] Anxiety; ASHD (arteriosclerotic heart disease) (2014); Coronary artery disease; Coronary atherosclerosis due to calcified coronary lesion (CODE) (5/23/2016); Diverticulitis; Duodenal ulcer; DVT (deep venous thrombosis) ([address] GERD (gastroesophageal reflux disease); HTN (hypertension); Hypercholesterolemia; Hypothyroidism; Kidney stones; Myositis; Personal history of DVT (deep vein thrombosis) (11/18/2016); Protein C deficiency ([address] Rhabdomyolysis (2018); Sleep apnea; 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 ([address] and Varicella. Mr. Otis Ruiz  has a past surgical history that includes hx colonoscopy (2013); hx wisdom teeth extraction (1980's); hx ureterolithotomy (Right, 2013); and hx heart catheterization (2015).   Social History/Living Environment:        Social History     Social History    Marital status:      Spouse name: N/A    Number of children: N/A    Years of education: N/A     Occupational History    Trainer Unknown     3[employer]     Social History Main Topics    Smoking status: Former Smoker     Quit date: 5/2/2006    Smokeless tobacco: Former User    Alcohol use 0.5 oz/week     1 Standard drinks or equivalent per week      Comment: ocass    Drug use: No    Sexual activity: Not on file     Other Topics Concern    Not on file     Social History Narrative     Prior Level of Function/Work/Activity:  Independent    Active Ambulatory Problems     Diagnosis Date Noted    Hypercholesterolemia     Sleep apnea     Primary hypothyroidism     GERD (gastroesophageal reflux disease)     ASHD (arteriosclerotic heart disease)     Coronary atherosclerosis due to calcified coronary lesion (CODE) 05/23/2016    Personal history of DVT (deep vein thrombosis) 11/18/2016    HTN (hypertension)     Obesity, morbid ([address] 02/27/2018    Coronary artery disease     DVT (deep venous thrombosis) (HCC)     Elevated liver enzymes 03/01/2018    Elevated CK 03/01/2018    Rhabdomyolysis 01/01/2018    Myositis     Elevated random blood glucose level 06/18/2018    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[address] 06/18/2018    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 ([address]      Resolved Ambulatory Problems     Diagnosis Date Noted    DVT (deep venous thrombosis) (HCC)      Past Medical History:   Diagnosis Date    Angina pectoris ([address]     Anxiety     ASHD (arteriosclerotic heart disease) 2014    Coronary artery disease     Coronary atherosclerosis due to calcified coronary lesion (CODE) 5/23/2016    Diverticulitis     Duodenal ulcer     DVT (deep venous thrombosis) (HCC)     GERD (gastroesophageal reflux disease)     HTN (hypertension)     Hypercholesterolemia     Hypothyroidism     Kidney stones     Myositis     Personal history of DVT (deep vein thrombosis) 11/18/2016    Protein C deficiency ([address]     Rhabdomyolysis 2018    Sleep apnea     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 ([address]     Varicella      Note: Patient denies any increase of symptoms with cough, sneeze or valsalva. Patient denies any saddle paresthesia or bowel/bladder deficits. Personal Factors:          Sex:  male        Age:  61 y.o. Current Medications:    Current Outpatient Prescriptions:     fluconazole (DIFLUCAN) 100 mg tablet, Take 1 Tab by mouth daily for 7 days. FDA advises cautious prescribing of oral fluconazole in pregnancy. , Disp: 7 Tab, Rfl: 0    ketoconazole (NIZORAL) 2 % topical cream, Apply  to affected area daily. , Disp: , Rfl:     methylPREDNISolone (MEDROL) 32 mg tablet, Take 1.5 tablets by mouth daily, Disp: 45 Tab, Rfl: 1    nitroglycerin (NITROSTAT) 0.4 mg SL tablet, 1 Tab by SubLINGual route every five (5) minutes as needed for Chest Pain., Disp: 25 Tab, Rfl: 3    amLODIPine (NORVASC) 5 mg tablet, Take 1 Tab by mouth daily. , Disp: 90 Tab, Rfl: 3    lisinopril-hydroCHLOROthiazide (PRINZIDE, ZESTORETIC) 10-12.5 mg per tablet, Take 1 Tab by mouth daily. , Disp: 90 Tab, Rfl: 3    metFORMIN (GLUCOPHAGE) 500 mg tablet, Take 2 Tabs by mouth two (2) times daily (with meals). , Disp: 360 Tab, Rfl: 3    alcohol swabs (ALCOHOL PREP PADS) padm, Use with insulin injections and to check glucose 4 times daily, E09.9, Disp: 400 Pad, Rfl: 3    DENZEL PEN NEEDLE 32 gauge x 5/32\" ndle, Use with insulin up to 4 times daily, E09.9, Disp: 400 Pen Needle, Rfl: 3    UNITHROID 112 mcg tablet, Take 2 tabs PO daily on empty stomach, ANDRADE 1, Disp: 180 Tab, Rfl: 3    NOVOLOG FLEXPEN U-100 INSULIN 100 unit/mL inpn, Use with correction 2/50>150, max daily dose 20 units, Disp: 6 Pen, Rfl: 3    magic mouthwash solution, Magic mouth wash  Maalox Lidocaine 2% viscous  Diphenhydramine oral solution  Nystatin                                    pharmacy to mix equal portions of ingredients to a total volume as indicated in the dispense amount. Swish and swallow 1 tsp qid, Disp: 360 mL, Rfl: 1    azaTHIOprine (IMURAN) 50 mg tablet, Take 2 Tabs by mouth two (2) times a day. , Disp: 360 Tab, Rfl: 1    DULoxetine (CYMBALTA) 30 mg capsule, Take 1 Cap by mouth daily. , Disp: 90 Cap, Rfl: 1    buPROPion SR (WELLBUTRIN, ZYBAN) 200 mg SR tablet, Take 1 Tab by mouth two (2) times a day., Disp: 180 Tab, Rfl: 1    metoprolol succinate (TOPROL-XL) 25 mg XL tablet, Take 1 Tab by mouth daily. , Disp: 90 Tab, Rfl: 1    warfarin (JANTOVEN) 5 mg tablet, Take 1 Tab by mouth daily. , Disp: 90 Tab, Rfl: 1    Prothrombin Time Test Strips (ihijiGUCHEK XS) strp, Test 1-4 times per month as directed, Disp: 48 Strip, Rfl: 1    fexofenadine (ALLEGRA) 180 mg tablet, Take 180 mg by mouth daily. , Disp: , Rfl:     aspirin 81 mg tablet, Take 81 mg by mouth daily. , Disp: , Rfl:     Omega-3-DHA-EPA-Fish Oil (FISH OIL) 1,000 (120-180) mg Cap, Take 3 Caps by mouth daily. , Disp: , Rfl:      Date Last Reviewed:  8/10/2018   EXAMINATION:   Observation/Orthostatic Postural Assessment:          *Rounded shoulders  Palpation:          Nothing tender and no complaints of tenderness. ROM:            AROM/PROM         Joint: Eval Date: 7/23/18  Re-Assess Date:  Re-Assess Date:    Active ROM RIGHT LEFT RIGHT LEFT RIGHT LEFT   Knee Extension 0 0       Knee Flexion [facility]       Hip Flexion 40  60       Hip Abduction         Lumbar Flexion 100%        Lumbar Extension 100%        Lumbar Side-bending 100% 100%       Lumbar Rotation 90% 90%          Strength:     Eval Date: 7/23/18  Re-Assess Date:  Re-Assess Date:      RIGHT LEFT RIGHT LEFT RIGHT LEFT   Knee Flexion  4+/5 4+/5       Knee Extension (L3, L4) 4/5 4/5       Hip Flexion (L1, L2) 4/5 4/5       Hip Abduction (L5, S1) 4/5 4/5       Ankle Dorsiflexion  5/5 5/5       Great Toe Extension (L5)                      Single leg stance right/left: 2 seconds bilaterally EO              Deep squat: Can perform but off balance.       Ham 90/90:   RIGHT LE: 40   LEFT LE: 60      Neurological Screen:    RADIATING SYMPTOMS?: YES/NO*--NONE  Functional Mobility:  Affecting participation in basic ADLs and functional tasks.  cardiopulmonary endurance. Balance: Tool Used: Timed Up and Go (TUG)  Score:  Initial: 20 seconds Most Recent: X seconds (Date: -- )   Interpretation of Score: The test measures, in seconds, the time taken by an individual to stand up from a standard arm chair (seat height 46 cm [18 in], arm height 65 cm [25.6 in]), walk a distance of 3 meters (118 in, approx 10 ft), turn, walk back to the chair and sit down. If the individual takes longer than 14 seconds to complete TUG, this indicates risk for falls. Score 7 7.5-10.5 11-14 14.5-17.5 18-21 21.5-24.5 25+   Modifier CH CI CJ CK CL CM CN     ? Mobility - Walking and Moving Around:     - CURRENT STATUS: CL - 60%-79% impaired, limited or restricted    - GOAL STATUS: CK - 40%-59% impaired, limited or restricted    - D/C STATUS:  ---------------To be determined---------------      Medical Necessity:   · Skilled intervention continues to be required due to decreased cardiopulmonary endurance affecting QOL and overall health and ability to perform ADLs. Reason for Services/Other Comments:  · Patient continues to require skilled intervention due to  above deficits affecting participation in basic ADLs and overall functional tolerance. TREATMENT:   (In addition to Assessment/Re-Assessment sessions the following treatments were rendered)  THERAPEUTIC EXERCISE: (55 minutes):  Exercises per grid below to improve mobility, strength and balance. Required minimal visual and verbal cues to promote proper body alignment and promote proper body posture. Progressed resistance, range and complexity of movement as indicated.      Date:  18 Date:  18 Date:  8/3/18 Date:  18 Date:  8/10/18   Activity/Exercise Parameters Parameters Parameters     Marching 10 10x E 20E  Slow in hallway 2x   Heel Raises 10 10x standing 25 25x 30x   Standing Hip Abduction 10 10x 10x2 10x2  10x2 Standing Hip Extension 10 10x 10x2 10x2  10x2   Minisquats 10 10x3 sit to stand  10x3 sit to stand yellow band 10x3 sit to stand yellow ball    NuStep  Level 3 5 minutes. Level 6 6 minutes. Level 8 3 minutes, level 3 3 minutes. Level 8 5 minutes; level 3 2 minutes   Gastroc Stretch   30\"x3 30\"x3 30\"x3 30\"x3   Shuttle  50# 2 minutes 100#   Yellow UE   20x 125#      20x 175#      25x     Stairs  3x 3x 3x    Rows   Blue 2x10   SLS  Heavy red band 20x    Ambulation    2 laps. Sit to stand    10x2 red TB UE 10x2 blueTB UE with 5x torso twists after each set. Cone taps    Foam 10x E    Bridges    25x    SLR    2X10 B LE    High Row     15x2 17# B UE                       Treatment/Session Assessment:  Katy Jay verbalized understanding of role of PT and POC. Increased resistance on NuStep and shuttle---showing great improvement---controls breathing much better. · Pain/ Symptoms: Initial:   0/10 Post Session:  0/10 ·   Compliance with Program/Exercises: Will assess as treatment progresses. · Recommendations/Intent for next treatment session: \"Next visit will focus on advancements to more challenging activities\".     Future Appointments  Date Time Provider Jagjit Rosas   8/13/2018 9:30 AM Peter Mabry MD [facility]   8/14/2018 7:15 AM GVLLE NUCLEAR STRESS [facility]   8/15/2018 7:15 AM GVLLE NUCLEAR STRESS [facility]   8/15/2018 4:00 PM Nereida Manges, DPT [facility]   8/17/2018 8:45 AM Nereida Manges, DPT [facility]   8/17/2018 9:30 AM EBEN NURSE ONLY [facility] EBEN EBEN   8/17/2018 10:00 AM Guillermo Chou PA-C END BS ENDO   8/20/2018 4:00 PM Nereida Manges, DPT [facility]   8/23/2018 7:30 AM GVLLE ECHO 21 [facility]   8/23/2018 12:00 PM Eric Snyder MD [facility]   8/29/2018 4:00 PM Nereida Ramirez DPT [facility]   8/31/2018 8:45 AM Nereida Ramirez DPT OST [facility]   8/31/2018 12:30 PM Joie Sanders MD [address] 9/17/2018 2:30 PM Ethan Celis [facility]   9/21/2018 9:45 AM PP PFT LAB [facility] PP PP       Total Treatment Duration:55 minutes Rx  PT Patient Time In/Time Out  Time In: 8075  Time Out: 8319 SAFIA [place], АЛЕКСАНДРT

## 2018-08-13 PROBLEM — M62.82 RHABDOMYOLYSIS: Status: RESOLVED | Noted: 2018-01-01 | Resolved: 2018-08-13

## 2018-08-13 PROBLEM — R73.09 ELEVATED RANDOM BLOOD GLUCOSE LEVEL: Status: RESOLVED | Noted: 2018-06-18 | Resolved: 2018-08-13

## 2018-08-15 ENCOUNTER — HOSPITAL ENCOUNTER (OUTPATIENT)
Dept: PHYSICAL THERAPY | Age: 58
Discharge: HOME | End: 2018-08-15
Attending: INTERNAL MEDICINE
Payer: COMMERCIAL

## 2018-08-15 PROCEDURE — 97110 THERAPEUTIC EXERCISES: CPT

## 2018-08-15 NOTE — PROGRESS NOTES
Barry Padilla  : 1960      Payor: Lazaro Khan / Plan: [place] [facility] / Product Type: PPO /  [address] [facility] at [address] [place] [address]. [address], [facility], [address] [place] [address]  [phone]   [phone]       OUTPATIENT PHYSICAL THERAPY:Daily Note 8/15/2018  Visit # 6   ICD-10: Treatment Diagnosis:                  Unsteadiness on feet (R26.81)    Polymyositis (HCC) [M33.20]  Muscle weakness (generalized) [M62.81]       Precautions/Allergies:   Review of patient's allergies indicates no known allergies. Fall Risk Score: 1 (? 5 = High Risk)  MD Orders: Eval and Treat  MEDICAL/REFERRING DIAGNOSIS:  Polymyositis   DATE OF ONSET: Chronic  REFERRING PHYSICIAN: Woo Raya MD  RETURN PHYSICIAN APPOINTMENT: TBD by Barry Padilla      INITIAL ASSESSMENT:  Mr. Barry Padilla has attended 1 physical therapy session including initial evaluation. Barry Padilla has been experiencing mm weakness and decreased cardiopulmonary endurance with concomitant decreased flexibility, decreased postural and core strength, decreased functional tolerance. Barry Padilla will benefit from skilled PT (medically necessary) to address above deficits affecting participation in basic ADLs and overall functional tolerance. Manual techniques (stretching, joint mobilizations, soft tissue mobilization/myofascial release), postural exercises/education, therapeutic techniques/activities, and HEP will be performed as appropriate addressing Sonal Viera's current condition. PROBLEM LIST (Impacting functional limitations):  1. Decreased Strength  2. Decreased ADL/Functional Activities  3. Decreased Transfer Abilities  4. Decreased Ambulation Ability/Technique  5. Decreased Balance  6. Increased Pain  7. Decreased Activity Tolerance  8. Increased Fatigue  9. Increased Shortness of Breath  10. Decreased Flexibility/Joint Mobility  11.  Decreased [place] with Home Exercise Program INTERVENTIONS PLANNED:  1. Balance Exercise  2. Bed Mobility  3. Cold  4. Cryotherapy  5. Electrical Stimulation  6. Family Education  7. Gait Training  8. Heat  9. Home Exercise Program (HEP)  10. Manual Therapy  11. Neuromuscular Re-education/Strengthening  12. Range of Motion (ROM)  13. Therapeutic Activites  14. Therapeutic Exercise/Strengthening  15. Transfer Training   TREATMENT PLAN:  Effective Dates: 7.23.18 TO 10/21/2018 (90 days). Frequency/Duration: 2 times a week for 90 Days  GOALS: (Goals have been discussed and agreed upon with patient.)  Short Term Goals 4 weeks   1. Baron Ibarra will be independent with HEP  2. Baron Ibarra will participate in LE stretching program to increase flexibility  3. Baron Ibarra will be able to ambulate 10 minutes with no rest break and O2 level >90%. 4. Baron Ibarra will be able to stand with good posture and no SOB or [place] for 5 minutes improving current level of function. 5. Baron Ibarra will participate in LE strengthening program with weights as appropriate to help with gait and elevations  6. Baron Ibarra will participate in static and dynamic balance activities to decrease the risk for falls  7. Baron Ibarra will tolerate manual therapy/joint mobilizations/soft tissue to increase ROM and decrease pain  8. Long Term Goals 8 weeks   1. Baron Ibarra will improve TUG to <=12 seconds showing improved ambulation safety. 2. Baron Ibarra will demonstrate 5/5 LE strength on manual muscle testing  3. Baron Ibarra will be able to perform SLS >5 seconds bilaterally to help with gait and improve balance  Rehabilitation Potential For Stated Goals: Good  Regarding Lajorgito  Maximiliano's therapy, I certify that the treatment plan above will be carried out by a therapist or under their direction.   Thank you for this referral,  Linnea Parmar DPT     Referring Physician Signature: Isidro Adames MD              Date                    HISTORY:   History of Present Injury/Illness (Reason for Referral):  Taurus Coyle has been experiencing muscle weakness ---- Taurus Coyle states last year he started getting weakness in arms and legs. He lost ROM of R arm and couldn't raise it up to his head. He finally was encouraged to go to MD by wife. Taurus Coyle first thought it was Myasthenias Gravis; however, after bloodwork it was not. Blood work revealed Polymyositis. He's been on steroids since February---he got to the point where he could not get out of bed in March. He has hx of Rhabdomyolysis that required hospitalization. He has since gotten stronger,  But is looking to get in better shape. His voice has been affected as well. .        -Present symptoms/complaints (on day of evaluation)  Pain Scale:  · Current: 0/10  · Best: 0/10  · Worst: 0/10      Past Medical History/Comorbidities:   Mr. Koffi Hoang  has a past medical history of Angina pectoris ([address] Anxiety; ASHD (arteriosclerotic heart disease) (2014); Coronary artery disease; Coronary atherosclerosis due to calcified coronary lesion (CODE) (5/23/2016); Diverticulitis; Duodenal ulcer; DVT (deep venous thrombosis) ([address] GERD (gastroesophageal reflux disease); HTN (hypertension); Hypercholesterolemia; Hypothyroidism; Kidney stones; Myositis; Personal history of DVT (deep vein thrombosis) (11/18/2016); Protein C deficiency ([address] Rhabdomyolysis (2018); Sleep apnea; 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 ([address] and Varicella. Mr. Koffi Hoang  has a past surgical history that includes hx colonoscopy (2013); hx wisdom teeth extraction (1980's); hx ureterolithotomy (Right, 2013); and hx heart catheterization (2015).   Social History/Living Environment:        Social History     Social History    Marital status:      Spouse name: N/A    Number of children: N/A    Years of education: N/A     Occupational History    Trainer Unknown     3M     Social History Main Topics    Smoking status: Former Smoker     Quit date: 5/2/2006    Smokeless tobacco: Former User    Alcohol use 0.5 oz/week     1 Standard drinks or equivalent per week      Comment: ocass    Drug use: No    Sexual activity: Not on file     Other Topics Concern    Not on file     Social History Narrative     Prior Level of Function/Work/Activity:  Independent    Active Ambulatory Problems     Diagnosis Date Noted    Hypercholesterolemia     Sleep apnea     Primary hypothyroidism     GERD (gastroesophageal reflux disease)     ASHD (arteriosclerotic heart disease)     Coronary atherosclerosis due to calcified coronary lesion (CODE) 05/23/2016    Personal history of DVT (deep vein thrombosis) 11/18/2016    HTN (hypertension)     Obesity, morbid ([address] 02/27/2018    Coronary artery disease     DVT (deep venous thrombosis) (HCC)     Elevated liver enzymes 03/01/2018    Elevated CK 03/01/2018    Myositis     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[address] 06/18/2018    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 ([address]      Resolved Ambulatory Problems     Diagnosis Date Noted    DVT (deep venous thrombosis) (HCC)     Rhabdomyolysis 01/01/2018    Elevated random blood glucose level 06/18/2018     Past Medical History:   Diagnosis Date    Angina pectoris ([address]     Anxiety     ASHD (arteriosclerotic heart disease) 2014    Coronary artery disease     Coronary atherosclerosis due to calcified coronary lesion (CODE) 5/23/2016    Diverticulitis     Duodenal ulcer     DVT (deep venous thrombosis) (HCC)     GERD (gastroesophageal reflux disease)     HTN (hypertension)     Hypercholesterolemia     Hypothyroidism     Kidney stones     Myositis     Personal history of DVT (deep vein thrombosis) 11/18/2016    Protein C deficiency ([address]     Rhabdomyolysis 2018    Sleep apnea     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 ([address]     Varicella      Note: Patient denies any increase of symptoms with cough, sneeze or valsalva. Patient denies any saddle paresthesia or bowel/bladder deficits. Personal Factors:          Sex:  male        Age:  61 y.o. Current Medications:    Current Outpatient Prescriptions:     methylPREDNISolone (MEDROL) 32 mg tablet, Take 32 mg by mouth. 1 1/2 pills daily, Disp: , Rfl:     fluconazole (DIFLUCAN) 100 mg tablet, Take 1 Tab by mouth daily for 7 days. FDA advises cautious prescribing of oral fluconazole in pregnancy. , Disp: 7 Tab, Rfl: 0    ketoconazole (NIZORAL) 2 % topical cream, Apply  to affected area daily. , Disp: , Rfl:     nitroglycerin (NITROSTAT) 0.4 mg SL tablet, 1 Tab by SubLINGual route every five (5) minutes as needed for Chest Pain., Disp: 25 Tab, Rfl: 3    amLODIPine (NORVASC) 5 mg tablet, Take 1 Tab by mouth daily. , Disp: 90 Tab, Rfl: 3    lisinopril-hydroCHLOROthiazide (PRINZIDE, ZESTORETIC) 10-12.5 mg per tablet, Take 1 Tab by mouth daily. , Disp: 90 Tab, Rfl: 3    metFORMIN (GLUCOPHAGE) 500 mg tablet, Take 2 Tabs by mouth two (2) times daily (with meals). , Disp: 360 Tab, Rfl: 3    alcohol swabs (ALCOHOL PREP PADS) padm, Use with insulin injections and to check glucose 4 times daily, E09.9, Disp: 400 Pad, Rfl: 3    DENZEL PEN NEEDLE 32 gauge x 5/32\" ndle, Use with insulin up to 4 times daily, E09.9, Disp: 400 Pen Needle, Rfl: 3    UNITHROID 112 mcg tablet, Take 2 tabs PO daily on empty stomach, ANDRADE 1, Disp: 180 Tab, Rfl: 3    NOVOLOG FLEXPEN U-100 INSULIN 100 unit/mL inpn, Use with correction 2/50>150, max daily dose 20 units, Disp: 6 Pen, Rfl: 3    magic mouthwash solution, Magic mouth wash  Maalox Lidocaine 2% viscous  Diphenhydramine oral solution  Nystatin                                    pharmacy to mix equal portions of ingredients to a total volume as indicated in the dispense amount. Swish and swallow 1 tsp qid, Disp: 360 mL, Rfl: 1    azaTHIOprine (IMURAN) 50 mg tablet, Take 2 Tabs by mouth two (2) times a day. , Disp: 360 Tab, Rfl: 1    DULoxetine (CYMBALTA) 30 mg capsule, Take 1 Cap by mouth daily. , Disp: 90 Cap, Rfl: 1    buPROPion SR (WELLBUTRIN, ZYBAN) 200 mg SR tablet, Take 1 Tab by mouth two (2) times a day., Disp: 180 Tab, Rfl: 1    metoprolol succinate (TOPROL-XL) 25 mg XL tablet, Take 1 Tab by mouth daily. , Disp: 90 Tab, Rfl: 1    warfarin (JANTOVEN) 5 mg tablet, Take 1 Tab by mouth daily. , Disp: 90 Tab, Rfl: 1    Prothrombin Time Test Strips (WiztangoGUCHEK XS) strp, Test 1-4 times per month as directed, Disp: 48 Strip, Rfl: 1    fexofenadine (ALLEGRA) 180 mg tablet, Take 180 mg by mouth daily. , Disp: , Rfl:     aspirin 81 mg tablet, Take 81 mg by mouth daily. , Disp: , Rfl:     Omega-3-DHA-EPA-Fish Oil (FISH OIL) 1,000 (120-180) mg Cap, Take 3 Caps by mouth daily. , Disp: , Rfl:      Date Last Reviewed:  8/15/2018   EXAMINATION:   Observation/Orthostatic Postural Assessment:          *Rounded shoulders  Palpation:          Nothing tender and no complaints of tenderness. ROM:            AROM/PROM         Joint: Eval Date: 7/23/18  Re-Assess Date:  Re-Assess Date:    Active ROM RIGHT LEFT RIGHT LEFT RIGHT LEFT   Knee Extension 0 0       Knee Flexion [facility]       Hip Flexion 40  60       Hip Abduction         Lumbar Flexion 100%        Lumbar Extension 100%        Lumbar Side-bending 100% 100%       Lumbar Rotation 90% 90%          Strength:     Eval Date: 7/23/18  Re-Assess Date:  Re-Assess Date:      RIGHT LEFT RIGHT LEFT RIGHT LEFT   Knee Flexion  4+/5 4+/5       Knee Extension (L3, L4) 4/5 4/5       Hip Flexion (L1, L2) 4/5 4/5       Hip Abduction (L5, S1) 4/5 4/5       Ankle Dorsiflexion  5/5 5/5       Great Toe Extension (L5)                      Single leg stance right/left: 2 seconds bilaterally EO              Deep squat: Can perform but off balance.       Ham 90/90:   RIGHT LE: 40   LEFT LE: 60      Neurological Screen:    RADIATING SYMPTOMS?: YES/NO*--NONE  Functional Mobility:  Affecting participation in basic ADLs and functional tasks.  cardiopulmonary endurance. Balance: Tool Used: Timed Up and Go (TUG)  Score:  Initial: 20 seconds Most Recent: X seconds (Date: -- )   Interpretation of Score: The test measures, in seconds, the time taken by an individual to stand up from a standard arm chair (seat height 46 cm [18 in], arm height 65 cm [25.6 in]), walk a distance of 3 meters (118 in, approx 10 ft), turn, walk back to the chair and sit down. If the individual takes longer than 14 seconds to complete TUG, this indicates risk for falls. Score 7 7.5-10.5 11-14 14.5-17.5 18-21 21.5-24.5 25+   Modifier CH CI CJ CK CL CM CN     ? Mobility - Walking and Moving Around:     - CURRENT STATUS: CL - 60%-79% impaired, limited or restricted    - GOAL STATUS: CK - 40%-59% impaired, limited or restricted    - D/C STATUS:  ---------------To be determined---------------      Medical Necessity:   · Skilled intervention continues to be required due to decreased cardiopulmonary endurance affecting QOL and overall health and ability to perform ADLs. Reason for Services/Other Comments:  · Patient continues to require skilled intervention due to  above deficits affecting participation in basic ADLs and overall functional tolerance. TREATMENT:   (In addition to Assessment/Re-Assessment sessions the following treatments were rendered)  THERAPEUTIC EXERCISE: (55 minutes):  Exercises per grid below to improve mobility, strength and balance. Required minimal visual and verbal cues to promote proper body alignment and promote proper body posture. Progressed resistance, range and complexity of movement as indicated. Date:  18 Date:  18 Date:  8/3/18 Date:  18 Date:  8/10/18 Date:  8/15/18   Activity/Exercise Parameters Parameters Parameters      Marching 10 10x E 20E  Slow in hallway 2x 30 stationary to work on balance and control.    Heel Raises 10 10x standing 25 25x 30x    Standing Hip Abduction 10 10x 10x2 10x2  10x2    Standing Hip Extension 10 10x 10x2 10x2  10x2    Minisquats 10 10x3 sit to stand  10x3 sit to stand yellow band 10x3 sit to stand yellow ball  10x with power cord    NuStep  Level 3 5 minutes. Level 6 6 minutes. Level 8 3 minutes, level 3 3 minutes. Level 8 5 minutes; level 3 2 minutes Level 8 2 minutes; level 4, 6 minutes   Gastroc Stretch   30\"x3 30\"x3 30\"x3 30\"x3 30\"x3   Shuttle  50# 2 minutes 100#   Yellow UE   20x 125#      20x 175#      25x   175#      25x2     Stairs  3x 3x 3x     Rows   Blue 2x10   SLS  Heavy red band 20x  30x   Ambulation    2 laps. 2 laps   Sit to stand    10x2 red TB UE 10x2 blueTB UE with 5x torso twists after each set. 10x2 blueTB UE with 5x torso twists after each set. Cone taps    Foam 10x E     Bridges    25x  15x2 with toes up   SLR    2X10 B LE     High Row     15x2 17# B UE 15x2 17# B UE   Stool Scoots      3x                Treatment/Session Assessment:  Orion Ino verbalized understanding of role of PT and POC. Breathing is getting better--he does get short of breath which limits his ability moreso than muscular fatigue. Balance looks good--no difficulty. Added power cord to make squats more difficult. · Pain/ Symptoms: Initial:   0/10 Post Session:  0/10 ·   Compliance with Program/Exercises: Will assess as treatment progresses. · Recommendations/Intent for next treatment session: \"Next visit will focus on advancements to more challenging activities\".     Future Appointments  Date Time Provider Jagjit Rosas   8/17/2018 8:45 AM Silvano Dempsey DPT [facility] AND [facility]   8/17/2018 9:30 AM EBEN NURSE ONLY [facility] EBEN [place]LL   8/17/2018 10:00 AM MARITZA Dowell ENDO   8/20/2018 4:00 PM Silvano Dempsey DPT [facility]   8/23/2018 7:30 AM GVLLE ECHO 21 [facility] UCDG UCD   8/23/2018 12:00 PM Johnathan Mendoza MD [facility]   8/29/2018 4:00 PM АЛЕКСАНДР EvansT [facility] AND HOME [facility]   8/31/2018 8:45 AM Trav Rojo DPT [facility]   8/31/2018 12:30 PM Randy Washington MD [facility] UCDG UCD   9/17/2018 2:30 PM Dmitry Celis SFODTR [facility]   9/21/2018 9:45 AM PP PFT LAB [facility] PP PP   12/14/2018 8:30 AM Luh Hsu MD [place] [facility]       Total Treatment Duration:55 minutes Rx  PT Patient Time In/Time Out  Time In: [place]  Time Out: [address] [facility] [address] David Gomez, DPT

## 2018-08-17 ENCOUNTER — HOSPITAL ENCOUNTER (OUTPATIENT)
Dept: ULTRASOUND IMAGING | Age: 58
Discharge: HOME | End: 2018-08-17
Attending: PHYSICIAN ASSISTANT
Payer: COMMERCIAL

## 2018-08-17 ENCOUNTER — HOSPITAL ENCOUNTER (OUTPATIENT)
Dept: PHYSICAL THERAPY | Age: 58
Discharge: HOME | End: 2018-08-17
Attending: INTERNAL MEDICINE
Payer: COMMERCIAL

## 2018-08-17 DIAGNOSIS — Z86.718 PERSONAL HISTORY OF DVT (DEEP VEIN THROMBOSIS): ICD-10-CM

## 2018-08-17 PROCEDURE — 93971 EXTREMITY STUDY: CPT

## 2018-08-17 NOTE — PROGRESS NOTES
Pt has been contacted by cardiology. Will start lovenox and f/u with PCP. Pt to go to ED if SOB, CP, GI bleed or if any new symptom develops.

## 2018-08-20 ENCOUNTER — HOSPITAL ENCOUNTER (OUTPATIENT)
Dept: PHYSICAL THERAPY | Age: 58
Discharge: HOME | End: 2018-08-20
Attending: INTERNAL MEDICINE
Payer: COMMERCIAL

## 2018-08-20 NOTE — PROGRESS NOTES
Spoke with Snow Meade on phone.   Will hold on session today as he said his INR was low --      Future Appointments  Date Time Provider Jagjit Rosas   8/20/2018 7:00 PM Eduarda Acosta DPT [facility] AND [facility]   8/23/2018 7:30 AM GVLLE ECHO 21 [facility]   8/23/2018 12:00 PM Velma Boudreaux MD [facility]   8/29/2018 4:00 PM Eduarda Acosta DPT [facility] AND [facility]   8/31/2018 8:45 AM Eduarda Acosta DPT [facility]   8/31/2018 12:30 PM Juan Ridley MD [facility]   9/17/2018 2:30 PM Prince Scarlet Celis [facility]ODTR [facility]   9/21/2018 9:45 AM PP PFT LAB [facility] PP PP   9/28/2018 10:00 AM Renée Saxena PA-C END BS ENDO   12/14/2018 8:30 AM Jose Parry MD [place] [facility]

## 2018-08-29 ENCOUNTER — HOSPITAL ENCOUNTER (OUTPATIENT)
Dept: PHYSICAL THERAPY | Age: 58
Discharge: HOME | End: 2018-08-29
Attending: INTERNAL MEDICINE
Payer: COMMERCIAL

## 2018-08-29 NOTE — PROGRESS NOTES
Rafita Mehul asked to cancel appt today due to leg pain. He has hx of DVT and will call Friday to see if he feels able to make appointment.

## 2018-08-30 PROBLEM — I87.2 VENOUS (PERIPHERAL) INSUFFICIENCY: Status: ACTIVE | Noted: 2018-08-30

## 2018-09-09 ENCOUNTER — HOSPITAL ENCOUNTER (EMERGENCY)
Age: 58
Discharge: HOME | End: 2018-09-09
Attending: EMERGENCY MEDICINE
Payer: COMMERCIAL

## 2018-09-09 ENCOUNTER — APPOINTMENT (OUTPATIENT)
Dept: ULTRASOUND IMAGING | Age: 58
End: 2018-09-09
Attending: EMERGENCY MEDICINE
Payer: COMMERCIAL

## 2018-09-09 VITALS
BODY MASS INDEX: 40.18 KG/M2 | HEART RATE: 83 BPM | RESPIRATION RATE: 16 BRPM | TEMPERATURE: 98.2 F | WEIGHT: 250 LBS | OXYGEN SATURATION: 95 % | SYSTOLIC BLOOD PRESSURE: 158 MMHG | DIASTOLIC BLOOD PRESSURE: 94 MMHG | HEIGHT: 66 IN

## 2018-09-09 DIAGNOSIS — R79.1 SUBTHERAPEUTIC INTERNATIONAL NORMALIZED RATIO (INR): Primary | ICD-10-CM

## 2018-09-09 DIAGNOSIS — Z86.718 HISTORY OF DVT (DEEP VEIN THROMBOSIS): ICD-10-CM

## 2018-09-09 LAB
BASOPHILS # BLD: 0.1 K/UL (ref 0–0.2)
BASOPHILS NFR BLD: 1 % (ref 0–2)
DIFFERENTIAL METHOD BLD: ABNORMAL
EOSINOPHIL # BLD: 0.1 K/UL (ref 0–0.8)
EOSINOPHIL NFR BLD: 1 % (ref 0.5–7.8)
ERYTHROCYTE [DISTWIDTH] IN BLOOD BY AUTOMATED COUNT: 15.9 %
HCT VFR BLD AUTO: 39 % (ref 41.1–50.3)
HGB BLD-MCNC: 12 G/DL (ref 13.6–17.2)
IMM GRANULOCYTES # BLD: 0.4 K/UL (ref 0–0.5)
IMM GRANULOCYTES NFR BLD AUTO: 5 % (ref 0–5)
INR PPP: 1.2
LYMPHOCYTES # BLD: 0.5 K/UL (ref 0.5–4.6)
LYMPHOCYTES NFR BLD: 7 % (ref 13–44)
MCH RBC QN AUTO: 33.5 PG (ref 26.1–32.9)
MCHC RBC AUTO-ENTMCNC: 30.8 G/DL (ref 31.4–35)
MCV RBC AUTO: 108.9 FL (ref 79.6–97.8)
MONOCYTES # BLD: 0.5 K/UL (ref 0.1–1.3)
MONOCYTES NFR BLD: 6 % (ref 4–12)
NEUTS SEG # BLD: 6.5 K/UL (ref 1.7–8.2)
NEUTS SEG NFR BLD: 81 % (ref 43–78)
NRBC # BLD: 0.06 K/UL (ref 0–0.2)
PLATELET # BLD AUTO: 265 K/UL (ref 150–450)
PMV BLD AUTO: 9.3 FL (ref 9.4–12.3)
PROTHROMBIN TIME: 14.9 SEC (ref 11.5–14.5)
RBC # BLD AUTO: 3.58 M/UL (ref 4.23–5.6)
WBC # BLD AUTO: 8 K/UL (ref 4.3–11.1)

## 2018-09-09 PROCEDURE — 85025 COMPLETE CBC W/AUTO DIFF WBC: CPT

## 2018-09-09 PROCEDURE — 85610 PROTHROMBIN TIME: CPT

## 2018-09-09 PROCEDURE — 99283 EMERGENCY DEPT VISIT LOW MDM: CPT | Performed by: EMERGENCY MEDICINE

## 2018-09-09 NOTE — LETTER
[address] [facility] EMERGENCY DEPT One [address] [address] 
[phone] Work/School Note Date: 9/9/2018 To Whom It May concern: 
 
Don Strickland was seen and treated today in the emergency room by the following provider(s): 
Attending Provider: Dory Dakin, MD. Don Strickland may return to work on 9/11/18. Sincerely, 
 
 
 
 
Karin Aponte

## 2018-09-09 NOTE — ED TRIAGE NOTES
Pt states he is sure he has a blood clot in the left thigh. Pt states he had a subcutaneous blood clot in the left calf. Pt also states that his INR has been out of normal range. Pt takes daily steroids. Pt had an ultra sound done of the left leg two weeks ago.

## 2018-09-09 NOTE — ED NOTES
I have reviewed discharge instructions with the patient and spouse. The patient and spouse verbalized understanding. Patient left ED via Discharge Method: ambulatory to Home with self transport. The patient is ambulatory upon exit and appears in no acute distress. The patient has been provided discharge instructions, prescription, and follow up information. The patient and wife does not have any questions at this time. Opportunity for questions and clarification provided. Patient given 1 scripts. To continue your aftercare when you leave the hospital, you may receive an automated call from our care team to check in on how you are doing. This is a free service and part of our promise to provide the best care and service to meet your aftercare needs.  If you have questions, or wish to unsubscribe from this service please call [phone]. Thank you for Choosing our [facility] Emergency Department.

## 2018-09-09 NOTE — DISCHARGE INSTRUCTIONS
Clotting Factor Deficiencies: Care Instructions  Your Care Instructions    Clotting factors are substances in the blood that help stop bleeding after a cut or injury. They also prevent sudden bleeding. In people who have clotting factor problems, the clotting factors don't work right or, in some cases, are missing. When blood does not clot well, even minor injuries can cause serious bleeding. This can lead to blood loss, injury to internal organs, or damage to muscles or joints. Several conditions, including hemophilia, can make it hard for the blood to clot. Your doctor can treat you by giving you replacement clotting factors. You also may take medicine to prevent bleeding. You may often have clotting factors transfused into a vein to prevent bleeding, or you may get them as needed. You may eventually learn to do this at home. You can also try to prevent injuries that can cause you to bleed. Follow-up care is a key part of your treatment and safety. Be sure to make and go to all appointments, and call your doctor if you are having problems. It's also a good idea to know your test results and keep a list of the medicines you take. How can you care for yourself at home? · Take your medicines exactly as prescribed. Call your doctor if you think you are having a problem with your medicine. You will get more details on the specific medicines your doctor prescribes. · Stay at a healthy body weight. If you are overweight, the additional stress on joints can trigger bleeding. · Exercise safely. Avoid contact sports. Swim or walk to avoid excess pressure on your joints. Check with your doctor before doing activities that put you at high risk for falls, such as riding a bike. · Brush and floss your teeth daily. This may help you avoid problems that could lead to having a tooth pulled. · Avoid aspirin and nonsteroidal anti-inflammatory drugs (NSAIDs), such as ibuprofen (Advil, Motrin) or naproxen (Aleve).  They can increase the chance of bleeding. · Take pain medicines exactly as directed. ¨ If the doctor gave you a prescription medicine for pain, take it as prescribed. ¨ If you are not taking a prescription pain medicine, ask your doctor if you can take an over-the-counter medicine. · Take care to prevent accidents at home:  ¨ Make sure rugs are tacked down so you do not slip. ¨ Keep furniture with sharp edges out of pathways. ¨ Use nonskid floor wax. ¨ Wipe up spills quickly. ¨ If you live in an area that gets snow and ice in the winter, sprinkle salt on steps and sidewalks. ¨ Avoid loose-fitting shoes. You might lose your balance and fall. · Wear medical alert jewelry that lists your clotting problem. You can buy this at most drugstores. When should you call for help? Call 911 anytime you think you may need emergency care. For example, call if:    · You passed out (lost consciousness).     · You have signs of severe bleeding, which includes:  ¨ You have a severe headache that is different from past headaches. ¨ You vomit blood or what looks like coffee grounds. ¨ Your stools are maroon or very bloody.    Call your doctor now or seek immediate medical care if:    · You are dizzy or lightheaded, or you feel like you may faint.     · You have abnormal bleeding, such as:  ¨ Your stools are black and look like tar, or they have streaks of blood. ¨ You have blood in your urine. ¨ You have joint pain. ¨ You have bruises or blood spots under your skin.    Watch closely for changes in your health, and be sure to contact your doctor if:    · You do not get better as expected. Where can you learn more? Go to http://ramy-hal.info/. Enter E249 in the search box to learn more about \"Clotting Factor Deficiencies: Care Instructions. \"  Current as of: October 9, 2017  Content Version: 11.7  © 9865-9070 [employer], [employer].  Care instructions adapted under license by [employer] (which disclaims liability or warranty for this information). If you have questions about a medical condition or this instruction, always ask your healthcare professional. [address] any warranty or liability for your use of this information.

## 2018-09-09 NOTE — ED PROVIDER NOTES
HPI Comments: Patient has recently been on steroids for polymyositis and this is been causing him to have trouble keeping his Coumadin and therapeutic for a couple of months. Patient had an ultrasound of his leg a couple weeks ago showing some chronic clot but nothing acute. He is had continued trouble with his PT/INR and with leg pain. His doctor, Dr. Mecca Whitehead has wanted to change him to xarelto, but the patient has been hesitant in the past, but is now willing to change to this medication. Patient is a 61 y.o. male presenting with leg pain. The history is provided by the patient. Leg Pain This is a new problem. The current episode started more than 1 week ago. The problem occurs daily. The problem has been gradually worsening. Pain location: left medial knee and thigh area. The quality of the pain is described as aching. The pain is mild. Pertinent negatives include no numbness and no tingling. Past Medical History:  
Diagnosis Date  Angina pectoris ([address]  Anxiety  ASHD (arteriosclerotic heart disease) 2014 Calcium score = 1056  Coronary artery disease  Coronary atherosclerosis due to calcified coronary lesion (CODE) 5/23/2016  Diverticulitis  Duodenal ulcer  DVT (deep venous thrombosis) ([address] 2 episodes  GERD (gastroesophageal reflux disease)  HTN (hypertension)  Hypercholesterolemia  Hypothyroidism  Kidney stones  Myositis  Personal history of DVT (deep vein thrombosis) 11/18/2016  Protein C deficiency ([address]  Rhabdomyolysis 2018 [facility] ER DT  Sleep apnea   
 using CPAP  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 ([address]  Varicella Past Surgical History:  
Procedure Laterality Date  HX COLONOSCOPY  2013  HX HEART CATHETERIZATION  2015  HX URETEROLITHOTOMY Right 2013  HX WISDOM TEETH EXTRACTION  1980's Family History:  
Problem Relation Age of Onset  Hypertension Brother  Heart Disease Father  Alzheimer Father  Heart Surgery Father   
  mid to late 62s [address] Other Mother \"valve issue\"  Pulmonary Embolism Mother   
  protien C deficiency  Heart Surgery Paternal Aunt  Heart Attack Paternal Uncle  Heart Surgery Paternal Uncle   
  in 76s  Diabetes Paternal Uncle  Kidney Disease Paternal Uncle  Kidney Disease Son   
  secondary to congenital vessel disease Social History Social History  Marital status:  Spouse name: N/A  
 Number of children: N/A  
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  
 Sexual activity: Not on file Other Topics Concern  Not on file Social History Narrative ALLERGIES: Review of patient's allergies indicates no known allergies. Review of Systems Constitutional: Negative for fever. Respiratory: Negative for cough and shortness of breath. Cardiovascular: Negative for chest pain. Skin: Negative for color change and rash. Neurological: Negative for tingling and numbness. Vitals:  
 09/09/18 1312 BP: 164/82 Pulse: 95 Resp: 16 Temp: 98.2 °F (36.8 °C) SpO2: 96% Weight: 113.4 kg (250 lb) Height: 5' 6\" (1.676 m) Physical Exam  
Constitutional: He appears well-developed and well-nourished. Eyes: Conjunctivae are normal.  
Cardiovascular: Normal rate, regular rhythm, normal heart sounds and intact distal pulses. Pulmonary/Chest: Effort normal and breath sounds normal.  
Abdominal: Soft. He exhibits no distension. There is no tenderness. There is no rebound and no guarding. Musculoskeletal: He exhibits tenderness (tenderness in the medial knee and medial thigh area. no warmth or erythema. Motor and sensation intact.   2+ dorsalis pedis and posterior tibial pulses. ). Neurological: He is alert. Skin: Skin is warm and dry. No rash noted. No erythema. Nursing note and vitals reviewed. MDM Number of Diagnoses or Management Options Diagnosis management comments: Patient is still subtherapeutic on his INR. He wishes to change to his overall toe. There is no need to repeat an ultrasound of his left leg so soon after a recent ultrasound given that we are changing him to a different medication and he is not therapeutic currently. Ending a new clot would not change the management at this point. Patient be changed to xarelto today. ED Course Procedures

## 2018-09-17 ENCOUNTER — HOSPITAL ENCOUNTER (OUTPATIENT)
Dept: DIABETES SERVICES | Age: 58
Discharge: HOME | End: 2018-09-17
Payer: COMMERCIAL

## 2018-09-17 DIAGNOSIS — E09.9 STEROID-INDUCED DIABETES MELLITUS (HCC): ICD-10-CM

## 2018-09-17 DIAGNOSIS — T38.0X5A STEROID-INDUCED DIABETES MELLITUS (HCC): ICD-10-CM

## 2018-09-17 PROCEDURE — G0108 DIAB MANAGE TRN  PER INDIV: HCPCS

## 2018-09-17 NOTE — PROGRESS NOTES
Came for diabetes educational assessment today. Provided basic information on carbohydrates, proteins and fats. Educational need/plan: Will attend 2 nutrition/2 diabetes sessions to address the following: diabetes disease process, nutritional management, physical activity, using medications, preventing complications, psychosocial adjustment, goal setting, problem solving, monitoring, behavior change strategies. Patient with autoimmune diease. Also reports a protein C Diff deficit that causes DVT's. He has not been exercising due to a recent subquatenous clot per patient in his leg. His DVT times two have been in his left calf.

## 2018-10-11 ENCOUNTER — HOSPITAL ENCOUNTER (OUTPATIENT)
Dept: DIABETES SERVICES | Age: 58
Discharge: HOME | End: 2018-10-11
Payer: COMMERCIAL

## 2018-10-11 PROCEDURE — G0109 DIAB MANAGE TRN IND/GROUP: HCPCS

## 2018-10-11 NOTE — PROGRESS NOTES
Attended nutrition diabetes #1 group session today. Topics included: disease process and treatment; carbohydrate choices (emphasizing high fiber carbohydrates); proteins (emphasizing heart healthy choices) and fat food choices (emphasizing unsaturated fats); free foods; combination food choices; nutrition tips for persons with diabetes; snack ideas; resources for diabetes management. Two methods of meal planning were reviewed: carbohydrate choices and carbohydrate counting. Basics of exercise discussed. Voiced /demonstrated understanding of material covered. Goal for next session is add a protein to meals and snacks. Anticipated adherence is good. Wife came with pt and supportive. Problems/barriers may be: none identified at this time.

## 2018-10-12 ENCOUNTER — HOSPITAL ENCOUNTER (OUTPATIENT)
Dept: PHYSICAL THERAPY | Age: 58
Discharge: HOME | End: 2018-10-12
Attending: INTERNAL MEDICINE
Payer: COMMERCIAL

## 2018-10-12 PROCEDURE — 97110 THERAPEUTIC EXERCISES: CPT

## 2018-10-12 PROCEDURE — 97164 PT RE-EVAL EST PLAN CARE: CPT

## 2018-10-12 NOTE — THERAPY RECERTIFICATION
Annia Cook  : 1960      Payor: Nicholas Knight / Plan: [facility] / Product Type: PPO /  [address] [place] [address], [facility], [address] [place] [address] Road  [phone]   [phone]       OUTPATIENT PHYSICAL THERAPY:Daily Note and Recertification   Visit # 7   ICD-10: Treatment Diagnosis:                  Unsteadiness on feet (R26.81)    Polymyositis (HCC) [M33.20]  Muscle weakness (generalized) [M62.81]       Precautions/Allergies:   Review of patient's allergies indicates no known allergies. Fall Risk Score: 1 (? 5 = High Risk)  MD Orders: Eval and Treat  MEDICAL/REFERRING DIAGNOSIS:  Polymyositis   DATE OF ONSET: Chronic  REFERRING PHYSICIAN: Nasir Gomez MD  RETURN PHYSICIAN APPOINTMENT: TBD by Annia Cook    10.12.18 Annia Cook has attended 7 session including intiial evaluation. He has not been seen for over a month due to complications of subcutaneous blood clots and other MD appointments. He presents today for possible continued PT secondary to mm weakness/polymyositis. He is very aware of signs and symptoms for blood clots and has been cleared for PT by MD.  Since last session, he has started back working off restricted duty this week. He is doing well overall---we will keep case open a few weeke and check in and see if he would like to continue PT. I do feel he is doing very well with strength---cardiopulmonary endurance needs to improve, but I do believe he can accomplish this at home. *I will check in with him in a few weeks to see how he is doing. Annia Cook will benefit from skilled PT (medically necessary) to address above deficits affecting participation in basic ADLs and overall functional tolerance.   Manual techniques (stretching, joint mobilizations, soft tissue mobilization/myofascial release), postural exercises/education, therapeutic techniques/activities, and HEP will be performed as appropriate addressing Travis Viera's current condition. PROBLEM LIST (Impacting functional limitations):  1. Decreased Strength  2. Decreased ADL/Functional Activities  3. Decreased Transfer Abilities  4. Decreased Ambulation Ability/Technique  5. Decreased Balance  6. Increased Pain  7. Decreased Activity Tolerance  8. Increased Fatigue  9. Increased Shortness of Breath  10. Decreased Flexibility/Joint Mobility  11. Decreased [place] with Home Exercise Program INTERVENTIONS PLANNED:  1. Balance Exercise  2. Bed Mobility  3. Cold  4. Cryotherapy  5. Electrical Stimulation  6. Family Education  7. Gait Training  8. Heat  9. Home Exercise Program (HEP)  10. Manual Therapy  11. Neuromuscular Re-education/Strengthening  12. Range of Motion (ROM)  13. Therapeutic Activites  14. Therapeutic Exercise/Strengthening  15. Transfer Training   TREATMENT PLAN:  Effective Dates: 7.23.18 TO 10/21/2018 (90 days). ---NOTE: RECERTIFICATION 18.17.46 TO 11.23.2018 (6 weeks)  Frequency/Duration: 2 times a week for 60 Days      GOALS: (Goals have been discussed and agreed upon with patient.)  Short Term Goals 4 weeks   Goals MODIFIED 10.12.18  1. Raman Antoine will be independent with HEP GOAL MET 10/12/2018   2. Raman Antoine will participate in LE stretching program to increase flexibility GOAL MET 10/12/2018   3. Raman Antoine will be able to ambulate 10 minutes with no rest break and O2 level >90%. GOAL MET 10/12/2018    4. Raman Antoine will be able to stand with good posture and no SOB or [place] for 5 minutes improving current level of function. Improved  5. Raman Antoine will participate in LE strengthening program with weights as appropriate to help with gait and elevations GOAL MET 10/12/2018   6.  Raman Antoine will participate in static and dynamic balance activities to decrease the risk for falls GOAL MET 10/12/2018 7. Jose R Thorpe will tolerate manual therapy/joint mobilizations/soft tissue to increase ROM and decrease pain GOAL MET 10/12/2018   8. Jose R Thorpe will be able to squat with no LOB and good technique. Long Term Goals 8 weeks   1. Jose R Thorpe will improve TUG to <=12 seconds showing improved ambulation safety. GOAL MET 10/12/2018   2. Jose R Thorpe will demonstrate 5/5 LE strength on manual muscle testing Improved   3. Jose R Thorpe will be able to perform SLS >5 seconds bilaterally to help with gait and improve balance Not met. Rehabilitation Potential For Stated Goals: Good  Regarding Albert Viera's therapy, I certify that the treatment plan above will be carried out by a therapist or under their direction. Thank you for this referral,  Aidan Park DPT     Referring Physician Signature: Verito Lemus MD              Date                    HISTORY:   History of Present Injury/Illness (Reason for Referral):  Jose R Thorpe has been experiencing muscle weakness ---- Jose R Thorpe states last year he started getting weakness in arms and legs. He lost ROM of R arm and couldn't raise it up to his head. He finally was encouraged to go to MD by wife. Jose R Thorpe first thought it was Myasthenias Gravis; however, after bloodwork it was not. Blood work revealed Polymyositis. He's been on steroids since February---he got to the point where he could not get out of bed in March. He has hx of Rhabdomyolysis that required hospitalization. He has since gotten stronger,  But is looking to get in better shape. His voice has been affected as well. .        -Present symptoms/complaints (on day of evaluation)  Pain Scale:  · Current: 0/10  · Best: 0/10  · Worst: 0/10      Past Medical History/Comorbidities:   Mr. Mylo Dubin  has a past medical history of Angina pectoris ([address] Anxiety; ASHD (arteriosclerotic heart disease) (2014);  Coronary artery disease; Coronary atherosclerosis due to calcified coronary lesion (CODE) (5/23/2016); Diverticulitis; Duodenal ulcer; DVT (deep venous thrombosis) ([address] GERD (gastroesophageal reflux disease); HTN (hypertension); Hypercholesterolemia; Hypothyroidism; Kidney stones; Myositis; Personal history of DVT (deep vein thrombosis) (11/18/2016); Protein C deficiency ([address] Rhabdomyolysis (2018); Sleep apnea; 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 ([address] and Varicella. Mr. Opal Navarro  has a past surgical history that includes hx colonoscopy (2013); hx wisdom teeth extraction (1980's); hx ureterolithotomy (Right, 2013); and hx heart catheterization (2015).   Social History/Living Environment:        Social History     Social History    Marital status:      Spouse name: N/A    Number of children: N/A    Years of education: N/A     Occupational History    Trainer Unknown     [employer]     Social History Main Topics    Smoking status: Former Smoker     Quit date: 5/2/2006    Smokeless tobacco: Former User    Alcohol use 0.5 oz/week     1 Standard drinks or equivalent per week    Drug use: No    Sexual activity: Not on file     Other Topics Concern    Not on file     Social History Narrative     Prior Level of Function/Work/Activity:  Independent    Active Ambulatory Problems     Diagnosis Date Noted    Hypercholesterolemia     Sleep apnea     Primary hypothyroidism     GERD (gastroesophageal reflux disease)     ASHD (arteriosclerotic heart disease)     Coronary atherosclerosis due to calcified coronary lesion (CODE) 05/23/2016    Personal history of DVT (deep vein thrombosis) 11/18/2016    HTN (hypertension)     Obesity, morbid ([address] 02/27/2018    Coronary artery disease     DVT (deep venous thrombosis) (HCC)     Elevated liver enzymes 03/01/2018    Elevated CK 03/01/2018    Myositis     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[address] 06/18/2018    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 ([address]     Venous (peripheral) insufficiency 08/30/2018 Resolved Ambulatory Problems     Diagnosis Date Noted    DVT (deep venous thrombosis) (HCC)     Rhabdomyolysis 01/01/2018    Elevated random blood glucose level 06/18/2018     Past Medical History:   Diagnosis Date    [facility] [address]     Anxiety     ASHD (arteriosclerotic heart disease) 2014    Coronary artery disease     Coronary atherosclerosis due to calcified coronary lesion (CODE) 5/23/2016    Diverticulitis     Duodenal ulcer     DVT (deep venous thrombosis) (HCC)     GERD (gastroesophageal reflux disease)     HTN (hypertension)     Hypercholesterolemia     Hypothyroidism     Kidney stones     Myositis     Personal history of DVT (deep vein thrombosis) 11/18/2016    Protein C deficiency ([facility]     Rhabdomyolysis 2018    Sleep apnea     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 ([facility]     Varicella      Note: Patient denies any increase of symptoms with cough, sneeze or valsalva. Patient denies any saddle paresthesia or bowel/bladder deficits. Personal Factors:          Sex:  male        Age:  61 y.o. Current Medications:    Current Outpatient Prescriptions:     methylPREDNISolone (MEDROL) 8 mg tablet, Take 2 Tabs by mouth daily. (Patient taking differently: Take 8 mg by mouth daily. Take 2 tabs daily), Disp: 60 Tab, Rfl: 3    psyllium husk/aspartame (NATURAL DAILY FIBER PO), Take  by mouth., Disp: , Rfl:     JARDIANCE 10 mg tablet, Take 1 Tab by mouth daily. , Disp: 30 Tab, Rfl: 11    rivaroxaban (XARELTO) 20 mg tab tablet, Take 1 Tab by mouth daily. , Disp: 90 Tab, Rfl: 1    mupirocin (BACTROBAN) 2 % ointment, Apply  to affected area two (2) times a day., Disp: 22 g, Rfl: 3    evolocumab (REPATHA SURECLICK) pen injection, 1 mL by SubCUTAneous route every fourteen (14) days. , Disp: 2 Pen, Rfl: 11    azaTHIOprine (IMURAN) 50 mg tablet, Take 2 Tabs by mouth two (2) times a day., Disp: 360 Tab, Rfl: 1    omeprazole (PRILOSEC) 40 mg capsule, Take 1 Cap by mouth daily. , Disp: 90 Cap, Rfl: 1    ketoconazole (NIZORAL) 2 % topical cream, Apply  to affected area daily. , Disp: , Rfl:     nitroglycerin (NITROSTAT) 0.4 mg SL tablet, 1 Tab by SubLINGual route every five (5) minutes as needed for Chest Pain., Disp: 25 Tab, Rfl: 3    amLODIPine (NORVASC) 5 mg tablet, Take 1 Tab by mouth daily. , Disp: 90 Tab, Rfl: 3    lisinopril-hydroCHLOROthiazide (PRINZIDE, ZESTORETIC) 10-12.5 mg per tablet, Take 1 Tab by mouth daily. , Disp: 90 Tab, Rfl: 3    metFORMIN (GLUCOPHAGE) 500 mg tablet, Take 2 Tabs by mouth two (2) times daily (with meals). , Disp: 360 Tab, Rfl: 3    alcohol swabs (ALCOHOL PREP PADS) padm, Use with insulin injections and to check glucose 4 times daily, E09.9, Disp: 400 Pad, Rfl: 3    DENZEL PEN NEEDLE 32 gauge x 5/32\" ndle, Use with insulin up to 4 times daily, E09.9, Disp: 400 Pen Needle, Rfl: 3    UNITHROID 112 mcg tablet, Take 2 tabs PO daily on empty stomach, ANDRADE 1, Disp: 180 Tab, Rfl: 3    magic mouthwash solution, Magic mouth wash  Maalox Lidocaine 2% viscous  Diphenhydramine oral solution  Nystatin                                    pharmacy to mix equal portions of ingredients to a total volume as indicated in the dispense amount. Swish and swallow 1 tsp qid, Disp: 360 mL, Rfl: 1    DULoxetine (CYMBALTA) 30 mg capsule, Take 1 Cap by mouth daily. , Disp: 90 Cap, Rfl: 1    buPROPion SR (WELLBUTRIN, ZYBAN) 200 mg SR tablet, Take 1 Tab by mouth two (2) times a day., Disp: 180 Tab, Rfl: 1    metoprolol succinate (TOPROL-XL) 25 mg XL tablet, Take 1 Tab by mouth daily. , Disp: 90 Tab, Rfl: 1    Prothrombin Time Test Strips (COAGUCHEK XS) strp, Test 1-4 times per month as directed, Disp: 48 Strip, Rfl: 1    fexofenadine (ALLEGRA) 180 mg tablet, Take 180 mg by mouth daily. , Disp: , Rfl:     aspirin 81 mg tablet, Take 81 mg by mouth daily. , Disp: , Rfl:     Omega-3-DHA-EPA-Fish Oil (FISH OIL) 1,000 (120-180) mg Cap, Take 3 Caps by mouth daily. , Disp: , Rfl:      Date Last Reviewed: 10/12/2018   EXAMINATION:   Observation/Orthostatic Postural Assessment:          *Rounded shoulders  Palpation:          Nothing tender and no complaints of tenderness. ROM:            AROM/PROM         Joint: Eval Date: 18  Re-Assess Date: 10.12.18  Re-Assess Date:    Active ROM RIGHT LEFT RIGHT LEFT RIGHT LEFT   Knee Extension 0 0 0 0     Knee Flexion [facility]       Hip Flexion 40  60       Hip Abduction         Lumbar Flexion 100%        Lumbar Extension 100%        Lumbar Side-bending 100% 100% 100% 100%     Lumbar Rotation 90% 90%          Strength:     Eval Date: 18  Re-Assess Date:  Re-Assess Date:      RIGHT LEFT RIGHT LEFT RIGHT LEFT   Knee Flexion  4+/5 4+/5 5/5 5/5     Knee Extension (L3, L4) 4/5 4/5 4+/5 4+/5     Hip Flexion (L1, L2) 4/5 4/5 4+/5 4+/5     Hip Abduction (L5, S1) 4/5 4/5 4+/5 4+/5     Ankle Dorsiflexion  5/5 5/5 5/5 5/5     Great Toe Extension (L5)                      Single leg stance right/left: 2 seconds bilaterally EO              Deep squat: Can perform but off balance. Ham 90/90:   RIGHT LE: 40---R LEG STILL LIMITED   LEFT LE: 60      Neurological Screen:    RADIATING SYMPTOMS?: YES/NO*--NONE  Functional Mobility:  Affecting participation in basic ADLs and functional tasks.  cardiopulmonary endurance. Balance: Tool Used: Timed Up and Go (TUG)  Score:  Initial: 20 seconds Most Recent: <10 seconds (Date: 10.12.18 )   Interpretation of Score: The test measures, in seconds, the time taken by an individual to stand up from a standard arm chair (seat height 46 cm [18 in], arm height 65 cm [25.6 in]), walk a distance of 3 meters (118 in, approx 10 ft), turn, walk back to the chair and sit down. If the individual takes longer than 14 seconds to complete TUG, this indicates risk for falls. Score 7 7.5-10.5 11-14 14.5-17.5 18-21 21.5-24.5 25+   Modifier CH CI CJ CK CL CM CN     ?  Mobility - Walking and Moving Around:     - CURRENT STATUS: CI - 1%-19% impaired, limited or restricted    - GOAL STATUS: CK - 40%-59% impaired, limited or restricted    - D/C STATUS:  ---------------To be determined---------------      Medical Necessity:   · Skilled intervention continues to be required due to decreased cardiopulmonary endurance affecting QOL and overall health and ability to perform ADLs. Reason for Services/Other Comments:  · Patient continues to require skilled intervention due to  above deficits affecting participation in basic ADLs and overall functional tolerance. TREATMENT:   (In addition to Assessment/Re-Assessment sessions the following treatments were rendered)    ReEval 10 minutes    THERAPEUTIC EXERCISE: (30 minutes):  Exercises per grid below to improve mobility, strength and balance. Required minimal visual and verbal cues to promote proper body alignment and promote proper body posture. Progressed resistance, range and complexity of movement as indicated. Date:  7.23.18 Date:  8.1.18 Date:  8/3/18 Date:  8/8/18 Date:  8/10/18 Date:  8/15/18 Date:  10/12/18   Activity/Exercise Parameters Parameters Parameters       Marching 10 10x E 20E  Slow in hallway 2x 30 stationary to work on balance and control. 30 stationary to work on balance and control. Heel Raises 10 10x standing 25 25x 30x     Standing Hip Abduction 10 10x 10x2 10x2  10x2     Standing Hip Extension 10 10x 10x2 10x2  10x2     Minisquats 10 10x3 sit to stand  10x3 sit to stand yellow band 10x3 sit to stand yellow ball  10x with power cord  Ball 20x    NuStep  Level 3 5 minutes. Level 6 6 minutes. Level 8 3 minutes, level 3 3 minutes.    Level 8 5 minutes; level 3 2 minutes Level 8 2 minutes; level 4, 6 minutes    Gastroc Stretch   30\"x3 30\"x3 30\"x3 30\"x3 30\"x3 30\"x3   Shuttle  50# 2 minutes 100#   Yellow UE   20x 125#      20x 175#      25x   175#      25x2      Stairs  3x 3x 3x      Rows   Blue 2x10   SLS  Heavy red band 20x  30x 30 foam Blue 10\" holds   Ambulation    2 laps. 2 laps    Sit to stand    10x2 red TB UE 10x2 blueTB UE with 5x torso twists after each set. 10x2 blueTB UE with 5x torso twists after each set. Cone taps    Foam 10x E      Bridges    25x  15x2 with toes up    SLR    2X10 B LE      High Row     15x2 17# B UE 15x2 17# B UE    Stool Scoots      3x    Side to side walking with core       Red  10x2; 2x     Rockerboard       Balance 30\" x2   Single leg RDL       To table with ball 10x                                               Treatment/Session Assessment:  Yasmany Walker verbalized understanding of role of PT and POC. ReEval'd as seen above--he is doing very well. Likely will DC, but after discussion with Yasmany Walker, we will keep case open a week or so in case symptoms change. O2 sats 97 with balance, 93 bpm.  No chest pain or SOB. No LOB but fatigue with rows            · Pain/ Symptoms: Initial:   0/10 Post Session:  0/10 ·   Compliance with Program/Exercises: Will assess as treatment progresses. · Recommendations/Intent for next treatment session: \"Next visit will focus on advancements to more challenging activities\".     Future Appointments  Date Time Provider Jagjit Rosas   10[facility]23[facility]2018 2:00 [facility]   10/26/2018 8:45 AM EBEN NURSE ONLY [facility] EBEN EBEN   11/2/2018 1:00 PM Shira Flynn MD [facility]   11/13/2018 2:00 PM Maximus [facility]vel RD [facility]   11/16/2018 3:00 PM Milad Marshall PA-C END BS ENDO   12/4/2018 2:00 PM Rossana Celis ODTR [facility]   12/5/2018 1:35 PM PP PFT LAB [facility] PP PP   12/5/2018 2:40 PM Shazia Mcadams MD [facility] PP PP   12/14/2018 8:30 AM Karey Saint, MD [facility]   12/27/2018 12:00 PM Maximus Jovel RD [facility]   2/15/2019 11:30 AM Josefina Nagy MD [facility] UCDG UCD       Total Treatment Duration: 40 minutes Rx  PT Patient Time In/Time Out  Time In: 0840  Time Out: [id number] [id number] [employer]

## 2018-10-23 ENCOUNTER — HOSPITAL ENCOUNTER (OUTPATIENT)
Dept: DIABETES SERVICES | Age: 58
Discharge: HOME | End: 2018-10-23
Payer: COMMERCIAL

## 2018-10-23 PROCEDURE — G0109 DIAB MANAGE TRN IND/GROUP: HCPCS

## 2018-10-23 NOTE — PROGRESS NOTES
Participant attended Diabetes #1 session today. Topics included: Characteristics/pathophysiology type 1/type 2 diabetes; Goal/acceptable blood glucose ranges/Hgb A1C/interpreting/using results;meters, continuous glucose monitors and insulin pumps. Using medications safely; Sick day management; Prevention/detection/treatment of acute complications. - Verbalized understanding of material covered.   -Goal for next session Nutrition Two   -Anticipated adherence is  Good   -Problems/barriers may be none anticipated

## 2018-11-13 ENCOUNTER — HOSPITAL ENCOUNTER (OUTPATIENT)
Dept: DIABETES SERVICES | Age: 58
Discharge: HOME | End: 2018-11-13
Payer: COMMERCIAL

## 2018-11-13 PROCEDURE — G0109 DIAB MANAGE TRN IND/GROUP: HCPCS

## 2018-11-13 NOTE — PROGRESS NOTES
Attended nutrition diabetes #2 group session today. Topics included: plate method for portion control; fiber and sodium guidelines; sugar substitutes; alcohol; eating out; recipe modification; label reading. Voiced/demonstrated understanding of material covered. Anticipated adherence is good. Pt's nutrition goal: to see grandchildren grow up, he will limit carbs to 45-60 grams per meal everywhere he eats for the rest of his life and re-evaluate in 3 months. Pt's support plan: use the grocery shopping guide and prepare healthier meals. Problems/barriers: old habits  Recommend check pre-meal blood glucose and 2 hour post-prandial blood glucose to see how food choices affect blood glucose. Plan for follow up is attend diabetes 2 class on 12/4/18.

## 2018-12-04 ENCOUNTER — TELEPHONE (OUTPATIENT)
Dept: DIABETES SERVICES | Age: 58
End: 2018-12-04

## 2018-12-04 NOTE — TELEPHONE ENCOUNTER
Patient called and reports cannot attend Diabetes Two session today and please call to reschedule. Given to  to call.

## 2018-12-14 PROBLEM — R74.8 ELEVATED LIVER ENZYMES: Status: RESOLVED | Noted: 2018-03-01 | Resolved: 2018-12-14

## 2019-01-14 NOTE — THERAPY RECERTIFICATION
Cammie Briceño  : 1960      Payor: Sage James / Plan: [place] [facility] / Product Type: PPO /  [address] [place] [address], [facility], [address] [place] [address] Road  [phone]   [phone]       OUTPATIENT PHYSICAL THERAPY:Daily Note and Recertification 7903  Visit # 7   DC ON 2019    ICD-10: Treatment Diagnosis:                  Unsteadiness on feet (R26.81)    Polymyositis (HCC) [M33.20]  Muscle weakness (generalized) [M62.81]       Precautions/Allergies:   Patient has no known allergies. Fall Risk Score: 1 (? 5 = High Risk)  MD Orders: Eval and Treat  MEDICAL/REFERRING DIAGNOSIS:  Polymyositis   DATE OF ONSET: Chronic  REFERRING PHYSICIAN: Gypsy Lepe MD  RETURN PHYSICIAN APPOINTMENT: TBD by Cammie Briceño    10.12.18 Cammie Briceño has attended 7 session including intiial evaluation. He has not been seen for over a month due to complications of subcutaneous blood clots and other MD appointments. He presents today for possible continued PT secondary to mm weakness/polymyositis. He is very aware of signs and symptoms for blood clots and has been cleared for PT by MD.  Since last session, he has started back working off restricted duty this week. He is doing well overall---we will keep case open a few weeke and check in and see if he would like to continue PT. I do feel he is doing very well with strength---cardiopulmonary endurance needs to improve, but I do believe he can accomplish this at home. *I will check in with him in a few weeks to see how he is doing. ** Note: DC case at this time as Cammie Briceño compliant with exercises and safe to DC case. Cammie Briceño will benefit from skilled PT (medically necessary) to address above deficits affecting participation in basic ADLs and overall functional tolerance.   Manual techniques (stretching, joint mobilizations, soft tissue mobilization/myofascial release), postural exercises/education, therapeutic techniques/activities, and HEP will be performed as appropriate addressing Luis Viera's current condition. PROBLEM LIST (Impacting functional limitations):  1. Decreased Strength  2. Decreased ADL/Functional Activities  3. Decreased Transfer Abilities  4. Decreased Ambulation Ability/Technique  5. Decreased Balance  6. Increased Pain  7. Decreased Activity Tolerance  8. Increased Fatigue  9. Increased Shortness of Breath  10. Decreased Flexibility/Joint Mobility  11. Decreased [place] with Home Exercise Program INTERVENTIONS PLANNED:  1. Balance Exercise  2. Bed Mobility  3. Cold  4. Cryotherapy  5. Electrical Stimulation  6. Family Education  7. Gait Training  8. Heat  9. Home Exercise Program (HEP)  10. Manual Therapy  11. Neuromuscular Re-education/Strengthening  12. Range of Motion (ROM)  13. Therapeutic Activites  14. Therapeutic Exercise/Strengthening  15. Transfer Training   TREATMENT PLAN:  Effective Dates: 7.23.18 TO 10/21/2018 (90 days). ---NOTE: RECERTIFICATION 19.61.76 TO 11.23.2018 (6 weeks)  Frequency/Duration: 2 times a week for 60 Days      GOALS: (Goals have been discussed and agreed upon with patient.)  Short Term Goals 4 weeks   Goals MODIFIED 10.12.18  1. Carroll Ramos will be independent with HEP GOAL MET 1/14/2019   2. Carroll Ramos will participate in LE stretching program to increase flexibility GOAL MET 1/14/2019   3. Carroll Ramos will be able to ambulate 10 minutes with no rest break and O2 level >90%. GOAL MET 1/14/2019    4. Carroll Ramos will be able to stand with good posture and no SOB or [place] for 5 minutes improving current level of function. Improved  5. Carroll Ramos will participate in LE strengthening program with weights as appropriate to help with gait and elevations GOAL MET 1/14/2019   6.  Carroll Ramos will participate in static and dynamic balance activities to decrease the risk for falls GOAL MET 1/14/2019   7. Alexa Betancourt will tolerate manual therapy/joint mobilizations/soft tissue to increase ROM and decrease pain GOAL MET 1/14/2019   8. Alexa Betancourt will be able to squat with no LOB and good technique. Long Term Goals 8 weeks   1. Alexa Betancourt will improve TUG to <=12 seconds showing improved ambulation safety. GOAL MET 1/14/2019   2. Alexa Betancourt will demonstrate 5/5 LE strength on manual muscle testing Improved   3. Alexa Betancourt will be able to perform SLS >5 seconds bilaterally to help with gait and improve balance Not met. Rehabilitation Potential For Stated Goals: Good  Regarding Kenzie Viera's therapy, I certify that the treatment plan above will be carried out by a therapist or under their direction. Thank you for this referral,  Holger Ruvalcaba DPT     Referring Physician Signature: Traci Andres MD              Date                    HISTORY:   History of Present Injury/Illness (Reason for Referral):  Alexa Betancourt has been experiencing muscle weakness ---- Alexa Betancourt states last year he started getting weakness in arms and legs. He lost ROM of R arm and couldn't raise it up to his head. He finally was encouraged to go to MD by wife. Alexa Betancourt first thought it was Myasthenias Gravis; however, after bloodwork it was not. Blood work revealed Polymyositis. He's been on steroids since February---he got to the point where he could not get out of bed in March. He has hx of Rhabdomyolysis that required hospitalization. He has since gotten stronger,  But is looking to get in better shape. His voice has been affected as well.   .        -Present symptoms/complaints (on day of evaluation)  Pain Scale:  · Current: 0/10  · Best: 0/10  · Worst: 0/10      Past Medical History/Comorbidities:   Mr. Carol Avina  has a past medical history of Angina pectoris ([address] Anxiety, ASHD (arteriosclerotic heart disease), Coronary artery disease, Coronary atherosclerosis due to calcified coronary lesion (CODE), Diverticulitis, Duodenal ulcer, DVT (deep venous thrombosis) (HCC), Elevated liver enzymes, GERD (gastroesophageal reflux disease), HTN (hypertension), Hypercholesterolemia, Hypothyroidism, Kidney stones, Myositis, Personal history of DVT (deep vein thrombosis), Protein C deficiency ([facility] Rhabdomyolysis, Sleep apnea, 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 ([facility] [address] [facility] and Varicella. Mr. Shamika Vázquez  has a past surgical history that includes hx colonoscopy (); hx wisdom teeth extraction (); hx ureterolithotomy (Right, ); and hx heart catheterization ().   Social History/Living Environment:        Social History     Socioeconomic History    Marital status:      Spouse name: Not on file    Number of children: Not on file    Years of education: Not on file    Highest education level: Not on file   Social Needs    Financial resource strain: Not on file    Food insecurity - worry: Not on file    Food insecurity - inability: Not on file   [employer] needs - medical: Not on file   [employer] needs - non-medical: Not on file   Occupational History    Occupation: Trainer     Employer: UNKNOWN     Comment: [employer]   Tobacco Use    Smoking status: Former Smoker     Last attempt to quit: 2006     Years since quittin.7    Smokeless tobacco: Former User   Substance and Sexual Activity    Alcohol use: No    Drug use: No    Sexual activity: Not on file   Other Topics Concern    Not on file   Social History Narrative    Not on file     Prior Level of Function/Work/Activity:  Independent    Active Ambulatory Problems     Diagnosis Date Noted    Hypercholesterolemia     Sleep apnea     Primary hypothyroidism     GERD (gastroesophageal reflux disease)     ASHD (arteriosclerotic heart disease)     Coronary atherosclerosis due to calcified coronary lesion (CODE) 05/23/2016    Personal history of DVT (deep vein thrombosis) 11/18/2016    HTN (hypertension)     Obesity, morbid ([address] 02/27/2018    Coronary artery disease     DVT (deep venous thrombosis) (HCC)     Elevated CK 03/01/2018    Myositis     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[address] 06/18/2018    Venous (peripheral) insufficiency 08/30/2018     Resolved Ambulatory Problems     Diagnosis Date Noted    DVT (deep venous thrombosis) (HCC)     Elevated liver enzymes 03/01/2018    Rhabdomyolysis 01/01/2018    Elevated random blood glucose level 06/18/2018    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 ([address]      Past Medical History:   Diagnosis Date    Angina pectoris ([address]     Anxiety     ASHD (arteriosclerotic heart disease) 2014    Coronary artery disease     Coronary atherosclerosis due to calcified coronary lesion (CODE) 5/23/2016    Diverticulitis     Duodenal ulcer     DVT (deep venous thrombosis) (HCC)     Elevated liver enzymes 3/1/2018    GERD (gastroesophageal reflux disease)     HTN (hypertension)     Hypercholesterolemia     Hypothyroidism     Kidney stones     Myositis     Personal history of DVT (deep vein thrombosis) 11/18/2016    Protein C deficiency ([address]     Rhabdomyolysis 2018    Sleep apnea     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 ([address]     Varicella      Note: Patient denies any increase of symptoms with cough, sneeze or valsalva. Patient denies any saddle paresthesia or bowel/bladder deficits. Personal Factors:          Sex:  male        Age:  60 y.o. Current Medications:    Current Outpatient Medications:     UNITHROID 200 mcg tablet, Take 1 Tab by mouth Daily (before breakfast). ANDRADE 1, Disp: 90 Tab, Rfl: 3    metFORMIN (GLUCOPHAGE) 500 mg tablet, Take 2 Tabs by mouth two (2) times daily (with meals). , Disp: 360 Tab, Rfl: 3    DULoxetine (CYMBALTA) 30 mg capsule, Take 1 Cap by mouth daily. , Disp: 90 Cap, Rfl: 1    metoprolol succinate (TOPROL-XL) 25 mg XL tablet, Take 1 Tab by mouth daily. , Disp: 90 Tab, Rfl: 0    rivaroxaban (XARELTO) 20 mg tab tablet, Take 1 Tab by mouth daily. , Disp: 90 Tab, Rfl: 1    omeprazole (PRILOSEC) 40 mg capsule, Take 1 Cap by mouth daily. , Disp: 90 Cap, Rfl: 1    azaTHIOprine (IMURAN) 50 mg tablet, Take 2 Tabs by mouth two (2) times a day., Disp: 360 Tab, Rfl: 1    losartan-hydroCHLOROthiazide (HYZAAR) 50-12.5 mg per tablet, Take 1 Tab by mouth daily. , Disp: 90 Tab, Rfl: 3    amLODIPine (NORVASC) 5 mg tablet, Take 1 Tab by mouth daily. , Disp: 90 Tab, Rfl: 3    buPROPion SR (WELLBUTRIN, ZYBAN) 200 mg SR tablet, Take 1 Tab by mouth two (2) times a day., Disp: 180 Tab, Rfl: 1    methylPREDNISolone (MEDROL) 8 mg tablet, Take 2 Tabs by mouth daily. (Patient taking differently: Take 8 mg by mouth daily.), Disp: 60 Tab, Rfl: 3    psyllium husk/aspartame (NATURAL DAILY FIBER PO), Take  by mouth., Disp: , Rfl:     evolocumab (REPATHA SURECLICK) pen injection, 1 mL by SubCUTAneous route every fourteen (14) days. , Disp: 2 Pen, Rfl: 11    nitroglycerin (NITROSTAT) 0.4 mg SL tablet, 1 Tab by SubLINGual route every five (5) minutes as needed for Chest Pain., Disp: 25 Tab, Rfl: 3    alcohol swabs (ALCOHOL PREP PADS) padm, Use with insulin injections and to check glucose 4 times daily, E09.9, Disp: 400 Pad, Rfl: 3    DENZEL PEN NEEDLE 32 gauge x 5/32\" ndle, Use with insulin up to 4 times daily, E09.9, Disp: 400 Pen Needle, Rfl: 3    magic mouthwash solution, Magic mouth wash  Maalox Lidocaine 2% viscous  Diphenhydramine oral solution  Nystatin                                    pharmacy to mix equal portions of ingredients to a total volume as indicated in the dispense amount. Swish and swallow 1 tsp qid, Disp: 360 mL, Rfl: 1    fexofenadine (ALLEGRA) 180 mg tablet, Take 180 mg by mouth daily. , Disp: , Rfl:     aspirin 81 mg tablet, Take 81 mg by mouth daily. , Disp: , Rfl:     Omega-3-DHA-EPA-Fish Oil (FISH OIL) 1,000 (120-180) mg Cap, Take 3 Caps by mouth daily. , Disp: , Rfl:      Date Last Reviewed:  2019   EXAMINATION:   Observation/Orthostatic Postural Assessment:          *Rounded shoulders  Palpation:          Nothing tender and no complaints of tenderness. ROM:            AROM/PROM         Joint: Eval Date: 18  Re-Assess Date: 10.12.18  Re-Assess Date:    Active ROM RIGHT LEFT RIGHT LEFT RIGHT LEFT   Knee Extension 0 0 0 0     Knee Flexion [facility]       Hip Flexion 40  60       Hip Abduction         Lumbar Flexion 100%        Lumbar Extension 100%        Lumbar Side-bending 100% 100% 100% 100%     Lumbar Rotation 90% 90%          Strength:     Eval Date: 18  Re-Assess Date:  Re-Assess Date:      RIGHT LEFT RIGHT LEFT RIGHT LEFT   Knee Flexion  4+/5 4+/5 5/5 5/5     Knee Extension (L3, L4) 4/5 4/5 4+/5 4+/5     Hip Flexion (L1, L2) 4/5 4/5 4+/5 4+/5     Hip Abduction (L5, S1) 4/5 4/5 4+/5 4+/5     Ankle Dorsiflexion  5/5 5/5 5/5 5/5     Great Toe Extension (L5)                      Single leg stance right/left: 2 seconds bilaterally EO              Deep squat: Can perform but off balance. Ham 90/90:   RIGHT LE: 40---R LEG STILL LIMITED   LEFT LE: 60      Neurological Screen:    RADIATING SYMPTOMS?: YES/NO*--NONE  Functional Mobility:  Affecting participation in basic ADLs and functional tasks.  cardiopulmonary endurance. Balance: Tool Used: Timed Up and Go (TUG)  Score:  Initial: 20 seconds Most Recent: <10 seconds (Date: 10.12.18 )   Interpretation of Score: The test measures, in seconds, the time taken by an individual to stand up from a standard arm chair (seat height 46 cm [18 in], arm height 65 cm [25.6 in]), walk a distance of 3 meters (118 in, approx 10 ft), turn, walk back to the chair and sit down. If the individual takes longer than 14 seconds to complete TUG, this indicates risk for falls. Score 7 7.5-10.5 11-14 14.5-17.5 18-21 21.5-24.5 25+   Modifier CH CI CJ CK CL CM CN     ?  Mobility - Walking and Moving Around:     - CURRENT STATUS: CI - 1%-19% impaired, limited or restricted    - GOAL STATUS: CK - 40%-59% impaired, limited or restricted    - D/C STATUS:  ---------------To be determined---------------      Medical Necessity:   · Skilled intervention continues to be required due to decreased cardiopulmonary endurance affecting QOL and overall health and ability to perform ADLs. ·    TREATMENT:   (In addition to Assessment/Re-Assessment sessions the following treatments were rendered)    ReEval 10 minutes    THERAPEUTIC EXERCISE: (30 minutes):  Exercises per grid below to improve mobility, strength and balance. Required minimal visual and verbal cues to promote proper body alignment and promote proper body posture. Progressed resistance, range and complexity of movement as indicated. Date:  7.23.18 Date:  8.1.18 Date:  8/3/18 Date:  8/8/18 Date:  8/10/18 Date:  8/15/18 Date:  10/12/18   Activity/Exercise Parameters Parameters Parameters       Marching 10 10x E 20E  Slow in hallway 2x 30 stationary to work on balance and control. 30 stationary to work on balance and control. Heel Raises 10 10x standing 25 25x 30x     Standing Hip Abduction 10 10x 10x2 10x2  10x2     Standing Hip Extension 10 10x 10x2 10x2  10x2     Minisquats 10 10x3 sit to stand  10x3 sit to stand yellow band 10x3 sit to stand yellow ball  10x with power cord  Ball 20x    NuStep  Level 3 5 minutes. Level 6 6 minutes. Level 8 3 minutes, level 3 3 minutes. Level 8 5 minutes; level 3 2 minutes Level 8 2 minutes; level 4, 6 minutes    Gastroc Stretch   30\"x3 30\"x3 30\"x3 30\"x3 30\"x3 30\"x3   Shuttle  50# 2 minutes 100#   Yellow UE   20x 125#      20x 175#      25x   175#      25x2      Stairs  3x 3x 3x      Rows   Blue 2x10   SLS  Heavy red band 20x  30x 30 foam Blue 10\" holds   Ambulation    2 laps. 2 laps    Sit to stand    10x2 red TB UE 10x2 blueTB UE with 5x torso twists after each set.  10x2 blueTB UE with 5x torso twists after each set. Cone taps    Foam 10x E      Bridges    25x  15x2 with toes up    SLR    2X10 B LE      High Row     15x2 17# B UE 15x2 17# B UE    Stool Scoots      3x    Side to side walking with core       Red  10x2; 2x     Rockerboard       Balance 30\" x2   Single leg RDL       To table with ball 10x                                               Treatment/Session Assessment:  Raphael Green verbalized understanding of role of PT and POC. ReEval'd as seen above--he is doing very well. Likely will DC, but after discussion with Raphael Green, we will keep case open a week or so in case symptoms change. O2 sats 97 with balance, 93 bpm.  No chest pain or SOB.   No LOB but fatigue with rows            · Pain/ Symptoms: Initial:   0/10 Post Session:  0/10 ·       Future Appointments   Date Time Provider Jagjit Rosas   1/17/2019  1:20 PM Sharon Paiz MD [facility]   1/31/2019  2:00 PM Meryle Mazzoni [facility]   2/12/2019  8:20 AM Donny Angulo MD [facility] PP PP   2/15/2019 11:30 AM Charline Santoyo MD [facility] UCDG UCD   4/12/2019 11:30 AM José Miguel Belle PA-C END BS ENDO   4/19/2019  9:15 AM Hi Hinson MD [facility]       Total Treatment Duration: 40 minutes Rx  PT Patient Time In/Time Out  Time In: 0840  Time Out: [id number] [facility] Maritza Astorga DPT

## 2019-01-23 ENCOUNTER — HOSPITAL ENCOUNTER (OUTPATIENT)
Dept: GENERAL RADIOLOGY | Age: 59
Discharge: HOME | End: 2019-01-23
Attending: INTERNAL MEDICINE
Payer: COMMERCIAL

## 2019-01-23 DIAGNOSIS — M25.559 ARTHRALGIA OF HIP, UNSPECIFIED LATERALITY: ICD-10-CM

## 2019-01-23 PROCEDURE — 73502 X-RAY EXAM HIP UNI 2-3 VIEWS: CPT

## 2019-02-04 ENCOUNTER — TELEPHONE (OUTPATIENT)
Dept: DIABETES SERVICES | Age: 59
End: 2019-02-04

## 2019-02-04 NOTE — TELEPHONE ENCOUNTER
Patient called to report that the hours are getting in the way and does not want to complete Diabetes Two Class. He completed Nutrition Classes and Diabetes One. Pt's nutrition goal: to see grandchildren grow up, he will limit carbs to 45-60 grams per meal everywhere he eats for the rest of his life and re-evaluate in 3 months. Pt's support plan: use the grocery shopping guide and prepare healthier meals. Problems/barriers: old habits.

## 2019-04-30 PROBLEM — Z79.01 CHRONIC ANTICOAGULATION: Status: ACTIVE | Noted: 2019-04-30

## 2020-06-17 ENCOUNTER — HOSPITAL ENCOUNTER (OUTPATIENT)
Dept: ULTRASOUND IMAGING | Age: 60
Discharge: HOME | End: 2020-06-17
Attending: INTERNAL MEDICINE
Payer: COMMERCIAL

## 2020-06-17 DIAGNOSIS — R10.11 RUQ ABDOMINAL PAIN: ICD-10-CM

## 2020-06-17 PROCEDURE — 76700 US EXAM ABDOM COMPLETE: CPT

## 2020-06-17 NOTE — PROGRESS NOTES
Abdominal ultrasound report reviewed and confirms he has a gallstone that may be intermittently causing pain. If pain worsens or does not resolve he may want to consult with a surgeon.   He has a kidney stone on the opposite side not causing any trouble

## 2020-09-24 PROBLEM — Z78.9 STATIN INTOLERANCE: Status: ACTIVE | Noted: 2020-09-24

## 2020-09-25 ENCOUNTER — HOSPITAL ENCOUNTER (OUTPATIENT)
Dept: LAB | Age: 60
Discharge: HOME | End: 2020-09-25
Payer: COMMERCIAL

## 2020-09-25 DIAGNOSIS — E78.5 DYSLIPIDEMIA: ICD-10-CM

## 2020-09-25 LAB
CHOLEST SERPL-MCNC: 143 MG/DL
COLLECTION COMMENT, COLCM: NORMAL
HDLC SERPL-MCNC: 51 MG/DL (ref 40–60)
HDLC SERPL: 2.8 {RATIO}
LDLC SERPL CALC-MCNC: 71.8 MG/DL
LIPID PROFILE,FLP: NORMAL
TRIGL SERPL-MCNC: 101 MG/DL (ref 35–150)
VLDLC SERPL CALC-MCNC: 20.2 MG/DL (ref 6–23)

## 2020-09-25 PROCEDURE — 80061 LIPID PANEL: CPT

## 2020-09-25 PROCEDURE — 36415 COLL VENOUS BLD VENIPUNCTURE: CPT

## 2020-10-15 PROBLEM — E66.9 OBESITY (BMI 30-39.9): Status: ACTIVE | Noted: 2020-10-15

## 2021-02-15 ENCOUNTER — HOSPITAL ENCOUNTER (OUTPATIENT)
Dept: CT IMAGING | Age: 61
Discharge: HOME | End: 2021-02-15
Attending: INTERNAL MEDICINE

## 2021-02-15 DIAGNOSIS — R10.9 ACUTE RIGHT FLANK PAIN: ICD-10-CM

## 2021-03-09 NOTE — PROGRESS NOTES
CT urogram result reviewed and show bilateral kidney sones. At the time of the exam the ones on the right were not causing obstruction so should not cause pain. Intermittent obstruction of the stones or passage of the stones could result in intermittent pain. Seda Olivares 30

## 2021-04-01 PROBLEM — R09.02 HYPOXEMIA: Status: ACTIVE | Noted: 2021-04-01

## 2021-06-14 ENCOUNTER — APPOINTMENT (OUTPATIENT)
Dept: GENERAL RADIOLOGY | Age: 61
End: 2021-06-14
Attending: EMERGENCY MEDICINE
Payer: COMMERCIAL

## 2021-06-14 ENCOUNTER — APPOINTMENT (OUTPATIENT)
Dept: CT IMAGING | Age: 61
End: 2021-06-14
Attending: EMERGENCY MEDICINE
Payer: COMMERCIAL

## 2021-06-14 ENCOUNTER — APPOINTMENT (OUTPATIENT)
Dept: ULTRASOUND IMAGING | Age: 61
End: 2021-06-14
Attending: EMERGENCY MEDICINE
Payer: COMMERCIAL

## 2021-06-14 ENCOUNTER — HOSPITAL ENCOUNTER (EMERGENCY)
Age: 61
Discharge: HOME | End: 2021-06-14
Attending: EMERGENCY MEDICINE
Payer: COMMERCIAL

## 2021-06-14 VITALS
HEIGHT: 67 IN | DIASTOLIC BLOOD PRESSURE: 82 MMHG | RESPIRATION RATE: 18 BRPM | SYSTOLIC BLOOD PRESSURE: 148 MMHG | BODY MASS INDEX: 38.61 KG/M2 | OXYGEN SATURATION: 98 % | WEIGHT: 246 LBS | HEART RATE: 66 BPM | TEMPERATURE: 98.2 F

## 2021-06-14 DIAGNOSIS — K82.8 THICKENING OF WALL OF GALLBLADDER: Primary | ICD-10-CM

## 2021-06-14 DIAGNOSIS — R10.11 RUQ PAIN: ICD-10-CM

## 2021-06-14 LAB
ALBUMIN SERPL-MCNC: 3.5 G/DL (ref 3.2–4.6)
ALBUMIN/GLOB SERPL: 1 {RATIO} (ref 1.2–3.5)
ALP SERPL-CCNC: 46 U/L (ref 50–136)
ALT SERPL-CCNC: 45 U/L (ref 12–65)
ANION GAP SERPL CALC-SCNC: 5 MMOL/L (ref 7–16)
AST SERPL-CCNC: 23 U/L (ref 15–37)
BACTERIA URNS QL MICRO: 0 /HPF
BASOPHILS # BLD: 0 K/UL (ref 0–0.2)
BASOPHILS NFR BLD: 1 % (ref 0–2)
BILIRUB SERPL-MCNC: 0.2 MG/DL (ref 0.2–1.1)
BUN SERPL-MCNC: 13 MG/DL (ref 8–23)
CALCIUM SERPL-MCNC: 9.9 MG/DL (ref 8.3–10.4)
CASTS URNS QL MICRO: ABNORMAL /LPF
CHLORIDE SERPL-SCNC: 107 MMOL/L (ref 98–107)
CO2 SERPL-SCNC: 29 MMOL/L (ref 21–32)
CREAT SERPL-MCNC: 0.63 MG/DL (ref 0.8–1.5)
DIFFERENTIAL METHOD BLD: ABNORMAL
EOSINOPHIL # BLD: 0.2 K/UL (ref 0–0.8)
EOSINOPHIL NFR BLD: 4 % (ref 0.5–7.8)
EPI CELLS #/AREA URNS HPF: 0 /HPF
ERYTHROCYTE [DISTWIDTH] IN BLOOD BY AUTOMATED COUNT: 21.4 % (ref 11.9–14.6)
GLOBULIN SER CALC-MCNC: 3.4 G/DL (ref 2.3–3.5)
GLUCOSE SERPL-MCNC: 89 MG/DL (ref 65–100)
HCT VFR BLD AUTO: 32.1 % (ref 41.1–50.3)
HGB BLD-MCNC: 9.2 G/DL (ref 13.6–17.2)
IMM GRANULOCYTES # BLD AUTO: 0 K/UL (ref 0–0.5)
IMM GRANULOCYTES NFR BLD AUTO: 1 % (ref 0–5)
LIPASE SERPL-CCNC: 122 U/L (ref 73–393)
LYMPHOCYTES # BLD: 0.6 K/UL (ref 0.5–4.6)
LYMPHOCYTES NFR BLD: 13 % (ref 13–44)
MCH RBC QN AUTO: 28.2 PG (ref 26.1–32.9)
MCHC RBC AUTO-ENTMCNC: 28.7 G/DL (ref 31.4–35)
MCV RBC AUTO: 98.5 FL (ref 79.6–97.8)
MONOCYTES # BLD: 0.5 K/UL (ref 0.1–1.3)
MONOCYTES NFR BLD: 11 % (ref 4–12)
NEUTS SEG # BLD: 3.4 K/UL (ref 1.7–8.2)
NEUTS SEG NFR BLD: 72 % (ref 43–78)
NRBC # BLD: 0 K/UL (ref 0–0.2)
PLATELET # BLD AUTO: 236 K/UL (ref 150–450)
PMV BLD AUTO: 9.8 FL (ref 9.4–12.3)
POTASSIUM SERPL-SCNC: 4.6 MMOL/L (ref 3.5–5.1)
PROT SERPL-MCNC: 6.9 G/DL (ref 6.3–8.2)
RBC # BLD AUTO: 3.26 M/UL (ref 4.23–5.6)
RBC #/AREA URNS HPF: >100 /HPF
SODIUM SERPL-SCNC: 141 MMOL/L (ref 138–145)
WBC # BLD AUTO: 4.7 K/UL (ref 4.3–11.1)
WBC URNS QL MICRO: ABNORMAL /HPF

## 2021-06-14 PROCEDURE — 71045 X-RAY EXAM CHEST 1 VIEW: CPT

## 2021-06-14 PROCEDURE — 81015 MICROSCOPIC EXAM OF URINE: CPT

## 2021-06-14 PROCEDURE — 99284 EMERGENCY DEPT VISIT MOD MDM: CPT

## 2021-06-14 PROCEDURE — 85025 COMPLETE CBC W/AUTO DIFF WBC: CPT

## 2021-06-14 PROCEDURE — 74011250636 HC RX REV CODE- 250/636: Performed by: EMERGENCY MEDICINE

## 2021-06-14 PROCEDURE — 76705 ECHO EXAM OF ABDOMEN: CPT

## 2021-06-14 PROCEDURE — 83690 ASSAY OF LIPASE: CPT

## 2021-06-14 PROCEDURE — 96376 TX/PRO/DX INJ SAME DRUG ADON: CPT

## 2021-06-14 PROCEDURE — 81003 URINALYSIS AUTO W/O SCOPE: CPT

## 2021-06-14 PROCEDURE — 80053 COMPREHEN METABOLIC PANEL: CPT

## 2021-06-14 PROCEDURE — 96374 THER/PROPH/DIAG INJ IV PUSH: CPT

## 2021-06-14 PROCEDURE — 74176 CT ABD & PELVIS W/O CONTRAST: CPT

## 2021-06-14 PROCEDURE — 96375 TX/PRO/DX INJ NEW DRUG ADDON: CPT

## 2021-06-14 RX ORDER — SODIUM CHLORIDE 0.9 % (FLUSH) 0.9 %
5-10 SYRINGE (ML) INJECTION AS NEEDED
Status: DISCONTINUED | OUTPATIENT
Start: 2021-06-14 | End: 2021-06-14 | Stop reason: HOSPADM

## 2021-06-14 RX ORDER — SODIUM CHLORIDE 0.9 % (FLUSH) 0.9 %
5-10 SYRINGE (ML) INJECTION EVERY 8 HOURS
Status: DISCONTINUED | OUTPATIENT
Start: 2021-06-14 | End: 2021-06-14 | Stop reason: HOSPADM

## 2021-06-14 RX ORDER — ONDANSETRON 4 MG/1
4 TABLET, ORALLY DISINTEGRATING ORAL
Qty: 20 TABLET | Refills: 0 | Status: SHIPPED | OUTPATIENT
Start: 2021-06-14 | End: 2022-05-13

## 2021-06-14 RX ORDER — ONDANSETRON 2 MG/ML
4 INJECTION INTRAMUSCULAR; INTRAVENOUS
Status: COMPLETED | OUTPATIENT
Start: 2021-06-14 | End: 2021-06-14

## 2021-06-14 RX ORDER — MORPHINE SULFATE 10 MG/ML
6 INJECTION, SOLUTION INTRAMUSCULAR; INTRAVENOUS ONCE
Status: COMPLETED | OUTPATIENT
Start: 2021-06-14 | End: 2021-06-14

## 2021-06-14 RX ORDER — MORPHINE SULFATE 10 MG/ML
6 INJECTION, SOLUTION INTRAMUSCULAR; INTRAVENOUS
Status: DISCONTINUED | OUTPATIENT
Start: 2021-06-14 | End: 2021-06-14

## 2021-06-14 RX ORDER — HYDROCODONE BITARTRATE AND ACETAMINOPHEN 10; 325 MG/1; MG/1
1 TABLET ORAL
Qty: 15 TABLET | Refills: 0 | Status: SHIPPED | OUTPATIENT
Start: 2021-06-14 | End: 2021-06-19

## 2021-06-14 RX ORDER — MORPHINE SULFATE 4 MG/ML
4 INJECTION INTRAVENOUS
Status: COMPLETED | OUTPATIENT
Start: 2021-06-14 | End: 2021-06-14

## 2021-06-14 RX ADMIN — MORPHINE SULFATE 6 MG: 10 INJECTION INTRAVENOUS at 15:56

## 2021-06-14 RX ADMIN — Medication 10 ML: at 14:10

## 2021-06-14 RX ADMIN — MORPHINE SULFATE 4 MG: 4 INJECTION INTRAVENOUS at 14:10

## 2021-06-14 RX ADMIN — SODIUM CHLORIDE 1000 ML: 900 INJECTION, SOLUTION INTRAVENOUS at 14:10

## 2021-06-14 RX ADMIN — ONDANSETRON 4 MG: 2 INJECTION INTRAMUSCULAR; INTRAVENOUS at 14:10

## 2021-06-14 NOTE — ED PROVIDER NOTES
1 year ago patient was diagnosed with gallstones. Also has a history of kidney stones. Last night about 2330 developed right flank pain going into the right upper back. Has some nausea with it. No vomiting. No diarrhea or constipation. No dysuria hematuria. Pain was worse throughout the night and was somewhat improved this morning but still present so came in. The history is provided by the patient. No  was used. Abdominal Pain   This is a new problem. The current episode started yesterday. The problem occurs constantly. The problem has been gradually improving. The pain is associated with an unknown factor. The pain is located in the RUQ and generalized abdominal region. The quality of the pain is aching. The pain is moderate. Associated symptoms include nausea and back pain. Pertinent negatives include no fever, no diarrhea, no melena, no vomiting, no constipation, no dysuria, no hematuria, no headaches, no chest pain and no testicular pain. The pain is worsened by eating and palpation. The pain is relieved by nothing. His past medical history is significant for gallstones, diverticulitis and kidney stones.         Past Medical History:   Diagnosis Date    Angina pectoris ([address]     Anxiety     ASHD (arteriosclerotic heart disease) 2014    Calcium score = 1056    Bursitis     Cancer ([address]     basal cell carcinoma    Coronary artery disease     Coronary atherosclerosis due to calcified coronary lesion (CODE) 5/23/2016    Diverticulitis     Duodenal ulcer     DVT (deep venous thrombosis) (HCC)     2 episodes    Elevated liver enzymes 3/1/2018    GERD (gastroesophageal reflux disease)     HTN (hypertension)     Hypercholesterolemia     Hypothyroidism     Kidney stones     Myositis     Personal history of DVT (deep vein thrombosis) 11/18/2016    Protein C deficiency ([address]     Rhabdomyolysis 2018    [facility]    Sleep apnea     using CPAP    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 [facility]     Varicella        Past Surgical History:   Procedure Laterality Date    HX COLONOSCOPY  2013    HX HEART CATHETERIZATION  2015    HX HEENT Right 11/2020    skin lesion bx of ear    HX URETEROLITHOTOMY Right 2013    HX WISDOM TEETH EXTRACTION  1980's         Family History:   Problem Relation Age of Onset    Hypertension Brother     Sleep Apnea Brother     Heart Disease Father     Alzheimer Father     Heart Surgery Father         mid to late 58s    Other Mother         \"valve issue\"    Pulmonary Embolism Mother         protien C deficiency    Heart Surgery Paternal Aunt     Heart Attack Paternal Uncle     Heart Surgery Paternal Uncle         in 76s    Diabetes Paternal Uncle     Kidney Disease Paternal Uncle     Kidney Disease Son         secondary to congenital vessel disease       Social History     Socioeconomic History    Marital status:      Spouse name: Meghana Saini Number of children: 2    Years of education: Not on file    Highest education level: Not on file   Occupational History    Occupation: [employer]     Employer: UNKNOWN     Comment: [employer]   Tobacco Use    Smoking status: Former Smoker     Packs/day: 1.00     Years: 28.00     Pack years: 28.00     Quit date: 5/2/2006     Years since quitting: 15.1    Smokeless tobacco: Former User     Quit date: 2006   Vaping Use    Vaping Use: Never used   Substance and Sexual Activity    Alcohol use: No    Drug use: No    Sexual activity: Not on file   Other Topics Concern    Not on file   Social History Narrative    No pets. Patient currently drinks 2 caffeine drinks per day. Social Determinants of Health     Financial Resource Strain:     Difficulty of Paying Living Expenses:    Food Insecurity:     Worried About Running Out of Food in the Last Year:     [address] in the Last Year:    Transportation Needs:     Lack of Transportation (Medical):      Lack of Transportation (Non-Medical): Physical Activity:     Days of Exercise per Week:     Minutes of Exercise per Session:    Stress:     Feeling of Stress :    Social Connections:     Frequency of Communication with Friends and Family:     Frequency of Social Gatherings with Friends and Family:     Attends Yarsanism Services:     Active Member of Clubs or Organizations:     Attends Club or Organization Meetings:     Marital Status:    Intimate Partner Violence:     Fear of Current or Ex-Partner:     Emotionally Abused:     Physically Abused:     Sexually Abused: ALLERGIES: Statins-hmg-coa reductase inhibitors    Review of Systems   Constitutional: Negative for chills and fever. HENT: Negative for rhinorrhea and sore throat. Eyes: Negative for pain and redness. Respiratory: Negative for chest tightness, shortness of breath and wheezing. Cardiovascular: Negative for chest pain and leg swelling. Gastrointestinal: Positive for abdominal pain and nausea. Negative for constipation, diarrhea, melena and vomiting. Genitourinary: Negative for dysuria, hematuria and testicular pain. Musculoskeletal: Positive for back pain. Negative for gait problem, neck pain and neck stiffness. Skin: Negative for color change and rash. Neurological: Negative for weakness, numbness and headaches. Vitals:    06/14/21 1231   BP: (!) 185/90   Pulse: 73   Resp: 18   Temp: 98.2 °F (36.8 °C)   SpO2: 97%   Weight: 111.6 kg (246 lb)   Height: 5' 7\" (1.702 m)            Physical Exam  Constitutional:       Appearance: Normal appearance. He is well-developed. He is obese. HENT:      Head: Normocephalic and atraumatic. Cardiovascular:      Rate and Rhythm: Normal rate and regular rhythm. Pulmonary:      Effort: Pulmonary effort is normal.      Breath sounds: Normal breath sounds. Abdominal:      General: Bowel sounds are normal.      Palpations: Abdomen is soft. Tenderness:  There is abdominal tenderness (diffuse increased RUQ and right CVA. ). Musculoskeletal:         General: No swelling. Normal range of motion. Cervical back: Normal range of motion and neck supple. Skin:     General: Skin is warm and dry. Neurological:      Mental Status: He is alert and oriented to person, place, and time. MDM  Number of Diagnoses or Management Options  Diagnosis management comments: Patient with a mobile gallstone diagnosed in 2020. Ultrasound today shows thickened gallbladder wall measuring 15 mm without gallstone. Common bile duct 5 mm. No elevation in bilirubin liver enzymes or lipase. Discussed with GI and can follow up in there office and get a HIDA scan. Amount and/or Complexity of Data Reviewed  Clinical lab tests: ordered and reviewed  Tests in the radiology section of CPT®: ordered and reviewed  Tests in the medicine section of CPT®: ordered and reviewed    Patient Progress  Patient progress: stable         Procedures      Study Result    Narrative & Impression   ABDOMINAL ULTRASOUND.     CLINICAL INDICATION: Right upper quadrant abdominal pain for two months     PROCEDURE: Realtime grayscale and color Doppler evaluation of the abdomen.     COMPARISON: Abdominal ultrasound dated 5/19/2017     FINDINGS: The liver is normal in size and echogenicity without focal lesion. A  mobile gallstone is present. The gallbladder wall is thin. A negative  sonographic Issa's sign is reported. The imaged pancreas is unremarkable. The  extrahepatic common bile duct is normal in caliber at 6.8 mm. The right kidney  is normal in size at 11.8 cm. There is no hydronephrosis. Echogenicity is  normal. The left kidney is normal in size at 12.2 cm. There is normal  echogenicity. There is a stone in the lower pole. No hydronephrosis appreciated. The spleen is within normal limits at 11.5 cm. The aorta and IVC are patent and  unremarkable.     IMPRESSION  IMPRESSION:   1. Cholelithiasis. 2. Nonobstructing left renal stone.  There is no hydronephrosis. CT ABD/PEL FOR RENAL STONE (Final result)  Result time 06/14/21 15:18:49  Procedure changed from CT UROGRAM WO CONT  Final result by Arjun Estes MD (06/14/21 15:18:49)                Impression:    1. Bilateral nonobstructing parapelvic renal stones. There is no hydronephrosis   or ureteral calculi. 2. Sigmoid colon diverticulosis without diverticulitis. Narrative:    CT of the abdomen and pelvis without contrast.     CLINICAL INDICATION: Right-sided abdominal pain     PROCEDURE: Serial thin-section axial images obtained from the upper abdomen   through the proximal femurs without the administration of intravenous or oral   contrast.  Radiation dose reduction techniques were used for this study. Our CT   scanners use one or all of the following: Automated exposure control, adjusted   of the mA and/or kV according to patient size, iterative reconstruction     COMPARISON: CT abdomen and pelvis dated 2/15/2021     FINDINGS:  Evaluation of the hollow and solid viscera is limited by the lack of   intravenous contrast.     CT ABDOMEN: No gallstones appreciated. The liver and spleen are unremarkable. The pancreas is normal. The adrenal glands are normal. The kidneys are symmetric   in size and contrast enhancement. Bilateral nonobstructing parapelvic renal   stones are present. The stones measure from 1 to 4 mm on the right with a single   large 10 mm stone on the left. There is no hydronephrosis. There are no ureteral   calculi. CT PELVIS: No distal ureteral or UVJ stones. The bladder is normal. Sigmoid   colon diverticulosis is present without nadine diverticulitis.  The appendix is   normal     No aggressive osseous lesion identified.                     XR CHEST PORT (Final result)  Result time 06/14/21 15:25:06  Final result by Arjun Estes MD (06/14/21 15:25:06)                Impression:    Normal single view chest x-ray            Narrative:    Single view chest x-ray     CLINICAL INDICATION: Right upper quadrant abdominal pain     FINDINGS: Single view of the chest compared to a similar exam dated 7/16/2018   show the lungs to be expanded and clear. No pleural effusion or pneumothorax. The cardiac silhouette and mediastinum are unremarkable. The bones are normal.                            US ABD LTD (Final result)  Result time 06/14/21 15:27:53  Final result by Oniel Jay MD (06/14/21 15:27:53)                Impression: Thickened gallbladder wall without gallstones. This is nonspecific. Narrative:    Right upper quadrant abdominal ultrasound. CLINICAL INDICATION: Nausea, right upper quadrant abdominal pain, severe     PROCEDURE: Realtime grayscale and color Doppler evaluation of the right upper   abdominal quadrant. COMPARISON: CT of the abdomen and pelvis from the same day     FINDINGS: The liver is normal in size and echogenicity without focal lesion. The   gallbladder is distended. There is a thickened edematous wall measuring 15 mm in   greatest thickness. No gallstones noted. A negative sonographic Beverlyn Hearing sign is   reported. The common bile duct measures 5.0 mm. The imaged portion the pancreas   is unremarkable. The right kidney is normal in size measuring 13.9 cm. There is   no hydronephrosis. Echogenicity is normal. The aorta and IVC are patent and   unremarkable.                Results Include:    Recent Results (from the past 24 hour(s))   CBC WITH AUTOMATED DIFF    Collection Time: 06/14/21 12:33 PM   Result Value Ref Range    WBC 4.7 4.3 - 11.1 K/uL    RBC 3.26 (L) 4.23 - 5.6 M/uL    HGB 9.2 (L) 13.6 - 17.2 g/dL    HCT 32.1 (L) 41.1 - 50.3 %    MCV 98.5 (H) 79.6 - 97.8 FL    MCH 28.2 26.1 - 32.9 PG    MCHC 28.7 (L) 31.4 - 35.0 g/dL    RDW 21.4 (H) 11.9 - 14.6 %    PLATELET 430 237 - 160 K/uL    MPV 9.8 9.4 - 12.3 FL    ABSOLUTE NRBC 0.00 0.0 - 0.2 K/uL    DF AUTOMATED      NEUTROPHILS 72 43 - 78 %    LYMPHOCYTES 13 13 - 44 % MONOCYTES 11 4.0 - 12.0 %    EOSINOPHILS 4 0.5 - 7.8 %    BASOPHILS 1 0.0 - 2.0 %    IMMATURE GRANULOCYTES 1 0.0 - 5.0 %    ABS. NEUTROPHILS 3.4 1.7 - 8.2 K/UL    ABS. LYMPHOCYTES 0.6 0.5 - 4.6 K/UL    ABS. MONOCYTES 0.5 0.1 - 1.3 K/UL    ABS. EOSINOPHILS 0.2 0.0 - 0.8 K/UL    ABS. BASOPHILS 0.0 0.0 - 0.2 K/UL    ABS. IMM. GRANS. 0.0 0.0 - 0.5 K/UL   METABOLIC PANEL, COMPREHENSIVE    Collection Time: 06/14/21 12:33 PM   Result Value Ref Range    Sodium 141 138 - 145 mmol/L    Potassium 4.6 3.5 - 5.1 mmol/L    Chloride 107 98 - 107 mmol/L    CO2 29 21 - 32 mmol/L    Anion gap 5 (L) 7 - 16 mmol/L    Glucose 89 65 - 100 mg/dL    BUN 13 8 - 23 MG/DL    Creatinine 0.63 (L) 0.8 - 1.5 MG/DL    GFR est AA >60 >60 ml/min/1.73m2    GFR est non-AA >60 >60 ml/min/1.73m2    Calcium 9.9 8.3 - 10.4 MG/DL    Bilirubin, total 0.2 0.2 - 1.1 MG/DL    ALT (SGPT) 45 12 - 65 U/L    AST (SGOT) 23 15 - 37 U/L    Alk.  phosphatase 46 (L) 50 - 136 U/L    Protein, total 6.9 6.3 - 8.2 g/dL    Albumin 3.5 3.2 - 4.6 g/dL    Globulin 3.4 2.3 - 3.5 g/dL    A-G Ratio 1.0 (L) 1.2 - 3.5     LIPASE    Collection Time: 06/14/21 12:33 PM   Result Value Ref Range    Lipase 122 73 - 393 U/L   URINE MICROSCOPIC    Collection Time: 06/14/21  1:34 PM   Result Value Ref Range    WBC 0-3 0 /hpf    RBC >100 (H) 0 /hpf    Epithelial cells 0 0 /hpf    Bacteria 0 0 /hpf    Casts 0-3 0 /lpf

## 2021-06-14 NOTE — ED TRIAGE NOTES
Pt complains of continued RUQ pain worse after he eats.  States pain was worse last night making it difficult to sleep

## 2021-06-14 NOTE — ED NOTES
I have reviewed discharge instructions with the patient and spouse. The patient and spouse verbalized understanding. Patient left ED via Discharge Method: ambulatory to Home with family    Opportunity for questions and clarification provided. Patient given 2 scripts. To continue your aftercare when you leave the hospital, you may receive an automated call from our care team to check in on how you are doing. This is a free service and part of our promise to provide the best care and service to meet your aftercare needs.  If you have questions, or wish to unsubscribe from this service please call [phone]. Thank you for Choosing our [facility] Emergency Department.

## 2021-06-22 ENCOUNTER — HOSPITAL ENCOUNTER (OUTPATIENT)
Dept: LAB | Age: 61
Discharge: HOME | End: 2021-06-22

## 2021-06-22 PROCEDURE — 88305 TISSUE EXAM BY PATHOLOGIST: CPT

## 2021-10-12 PROBLEM — M75.02 ADHESIVE CAPSULITIS OF LEFT SHOULDER: Status: ACTIVE | Noted: 2020-04-16

## 2021-10-27 PROBLEM — E11.9 TYPE 2 DIABETES MELLITUS (HCC): Status: ACTIVE | Noted: 2021-10-27

## 2021-10-29 ENCOUNTER — HOSPITAL ENCOUNTER (OUTPATIENT)
Dept: LAB | Age: 61
Discharge: HOME | End: 2021-10-29
Payer: COMMERCIAL

## 2021-10-29 DIAGNOSIS — E78.5 DYSLIPIDEMIA: ICD-10-CM

## 2021-10-29 LAB
CHOLEST SERPL-MCNC: 102 MG/DL
HDLC SERPL-MCNC: 47 MG/DL (ref 40–60)
HDLC SERPL: 2.2 {RATIO}
LDLC SERPL CALC-MCNC: 31.8 MG/DL
TRIGL SERPL-MCNC: 116 MG/DL (ref 35–150)
VLDLC SERPL CALC-MCNC: 23.2 MG/DL (ref 6–23)

## 2021-10-29 PROCEDURE — 36415 COLL VENOUS BLD VENIPUNCTURE: CPT

## 2021-10-29 PROCEDURE — 80061 LIPID PANEL: CPT

## 2022-03-18 PROBLEM — M75.02 ADHESIVE CAPSULITIS OF LEFT SHOULDER: Status: ACTIVE | Noted: 2020-04-16

## 2022-03-18 PROBLEM — Z78.9 DIFFICULTY USING CONTINUOUS POSITIVE AIRWAY PRESSURE (CPAP) NASAL MASK: Status: ACTIVE | Noted: 2020-09-24

## 2022-03-18 PROBLEM — E11.9 TYPE 2 DIABETES MELLITUS (HCC): Status: ACTIVE | Noted: 2021-10-27

## 2022-03-19 PROBLEM — E66.01 MORBID OBESITY WITH BMI OF 40.0-44.9, ADULT (HCC): Status: ACTIVE | Noted: 2018-02-27

## 2022-03-19 PROBLEM — R74.8 ELEVATED CK: Status: ACTIVE | Noted: 2018-03-01

## 2022-03-19 PROBLEM — T38.0X5A STEROID-INDUCED DIABETES MELLITUS (HCC): Status: ACTIVE | Noted: 2018-06-18

## 2022-03-19 PROBLEM — E66.9 OBESITY (BMI 30-39.9): Status: ACTIVE | Noted: 2020-10-15

## 2022-03-19 PROBLEM — I87.2 VENOUS (PERIPHERAL) INSUFFICIENCY: Status: ACTIVE | Noted: 2018-08-30

## 2022-03-19 PROBLEM — Z79.01 CHRONIC ANTICOAGULATION: Status: ACTIVE | Noted: 2019-04-30

## 2022-03-19 PROBLEM — E09.9 STEROID-INDUCED DIABETES MELLITUS (HCC): Status: ACTIVE | Noted: 2018-06-18

## 2022-03-20 PROBLEM — R09.02 HYPOXEMIA: Status: ACTIVE | Noted: 2021-04-01

## 2022-06-17 ENCOUNTER — TELEPHONE (OUTPATIENT)
Dept: SLEEP MEDICINE | Age: 62
End: 2022-06-17

## 2022-07-03 ENCOUNTER — PATIENT MESSAGE (OUTPATIENT)
Dept: CARDIOLOGY CLINIC | Age: 62
End: 2022-07-03

## 2022-07-05 NOTE — TELEPHONE ENCOUNTER
From: Casey Peace  To: Dr. Santosh Venegas: 7/3/2022 9:04 AM EDT  Subject: Xarelto refill    I need to get a new prescription for Xarelto sent to [facility] [address].    Thank you

## 2022-07-05 NOTE — TELEPHONE ENCOUNTER
Requested Prescriptions     Signed Prescriptions Disp Refills    rivaroxaban (XARELTO) 20 MG TABS tablet 90 tablet 3     Sig: Take 1 tablet by mouth daily     Authorizing Provider: Luana Comfort A     Ordering User: Jensen Smith

## 2022-07-07 RX ORDER — FERROUS FUMARATE AND POLYSACCHRIDE IRON VITAMIN MINERAL COMPLEX SUPPLEMENT 191.2; 135.9; 1; 210; 20; 5; 5; 7; 25; 3 MG/1; MG/1; MG/1; MG/1; MG/1; MG/1; MG/1; MG/1; MG/1; UG/1
CAPSULE ORAL
Qty: 90 CAPSULE | Refills: 3 | OUTPATIENT
Start: 2022-07-07

## 2022-07-25 DIAGNOSIS — G47.33 OSA (OBSTRUCTIVE SLEEP APNEA): Primary | ICD-10-CM

## 2022-07-25 NOTE — PROGRESS NOTES
Patient needs a new supply order. He has an appt in . P30i mask     Orders Placed This Encounter   Procedures    [facility] [address] [facility]  Phone: [address] [address]  Dept: [phone]      Patient Name: Radha Ortega  : 1960  Gender: male  Address: [address] [place] [address]   Patient phone: [phone] (home)       Primary Insurance: Payor: / No coverage found. Subscriber ID: No Subscriber Number on File      AMB Supply Order  Order Details     DME Location:   Order Date: 2022   There were no encounter diagnoses.              (  X   )Supplies Needed       cpap Machine   (  x   ) CPAP Unit  (     ) Auto CPAP Unit  (     ) BiLevel Unit  (     ) Auto BiLevel Unit  (     ) ASV        (     ) Bilevel ST      Length of need: 12 months    Pressure:  15 cmH20  EPR:     Starting Ramp Pressure:   cm H20  Ramp Time: min        Patient had a diagnostic Apnea Hypopnea Index (AHI) of :    *SUPPLIES* Replace all as needed, or per coverage guidelines     Masks Type:  (    ) -Full Face Mask (1 per 3 mon)  (    ) -Full Mask (1 per month) Interface/Cushion      ( x ) -Nasal Mask (1 per 3 mon)  ( x  ) - Nasal Mask (1 per month) Interface/Cushion  (  x   ) -Pillow (2 per mon)  (     ) -Cyzkuowon (1 per 6 mon)            Other Supplies:    (   X  )-Gxsibieh (1 per 6 mon)  (   X  )-Ohutpn Tubing (1 per 3 mon)  (   X  )- Disposable Filter (2 per mon)  (   x  )-Ddrazu Humidifier (1 per year)     (  x   )-Idlzdvqga (sometimes used with Full Face Mask) (1 per 6 mos)  (    )-Tubing-without heat (1 per 3 mos)  (     )-Non-Disposable Filter (1 per 6 mos)  (  x   )-Water Chamber (1 per 6 mos)  (     )-Humidifier non-heated (1 per 5 yrs)      Signed Date: 2022  Electronically Signed By: TJ Victor - CNP  Electronically Dated:  7/25/2022         TJ Victor - CNP

## 2022-07-26 ENCOUNTER — TELEPHONE (OUTPATIENT)
Dept: CARDIOLOGY CLINIC | Age: 62
End: 2022-07-26

## 2022-07-26 NOTE — TELEPHONE ENCOUNTER
Approved today  [id number]  Status:Approved  Review Type:Prior Auth  Coverage Start Date:06/26/2022  Coverage End Date:01/22/2023    I called and notified the patient that the PA has been approved.

## 2022-07-26 NOTE — TELEPHONE ENCOUNTER
Patient called and request samples of Xarelto until he is approved for this medication. Patient wants to  samples at [facility] office. Please call patient when samples are ready to be picked up in [facility]. His call back is [phone]. Also patient said he needs a prior authorization sent to his ins. So that [facility] [place]. [facility] [phone].

## 2022-08-08 RX ORDER — OMEPRAZOLE 40 MG/1
CAPSULE, DELAYED RELEASE ORAL
Qty: 90 CAPSULE | Refills: 7 | OUTPATIENT
Start: 2022-08-08

## 2022-09-16 ENCOUNTER — OFFICE VISIT (OUTPATIENT)
Dept: INTERNAL MEDICINE CLINIC | Facility: CLINIC | Age: 62
End: 2022-09-16
Payer: COMMERCIAL

## 2022-09-16 VITALS
HEART RATE: 65 BPM | HEIGHT: 60 IN | WEIGHT: 251 LBS | OXYGEN SATURATION: 98 % | DIASTOLIC BLOOD PRESSURE: 79 MMHG | BODY MASS INDEX: 49.28 KG/M2 | TEMPERATURE: 97.2 F | SYSTOLIC BLOOD PRESSURE: 133 MMHG

## 2022-09-16 DIAGNOSIS — I10 ESSENTIAL HYPERTENSION: ICD-10-CM

## 2022-09-16 DIAGNOSIS — Z23 NEED FOR PROPHYLACTIC VACCINATION AND INOCULATION AGAINST CHOLERA ALONE: ICD-10-CM

## 2022-09-16 DIAGNOSIS — E78.49 OTHER HYPERLIPIDEMIA: ICD-10-CM

## 2022-09-16 DIAGNOSIS — E11.9 TYPE 2 DIABETES MELLITUS WITHOUT COMPLICATION, WITHOUT LONG-TERM CURRENT USE OF INSULIN (HCC): ICD-10-CM

## 2022-09-16 DIAGNOSIS — I25.10 ASHD (ARTERIOSCLEROTIC HEART DISEASE): ICD-10-CM

## 2022-09-16 DIAGNOSIS — M33.20 POLYMYOSITIS (HCC): ICD-10-CM

## 2022-09-16 DIAGNOSIS — E03.9 ACQUIRED HYPOTHYROIDISM: ICD-10-CM

## 2022-09-16 DIAGNOSIS — Z12.5 SCREENING FOR PROSTATE CANCER: ICD-10-CM

## 2022-09-16 DIAGNOSIS — Z79.01 CHRONIC ANTICOAGULATION: ICD-10-CM

## 2022-09-16 DIAGNOSIS — F34.1 DYSTHYMIC DISORDER: ICD-10-CM

## 2022-09-16 DIAGNOSIS — E11.9 TYPE 2 DIABETES MELLITUS WITHOUT COMPLICATION, WITHOUT LONG-TERM CURRENT USE OF INSULIN (HCC): Primary | ICD-10-CM

## 2022-09-16 LAB
ALBUMIN SERPL-MCNC: 4 G/DL (ref 3.2–4.6)
ALBUMIN/GLOB SERPL: 1.3 {RATIO} (ref 1.2–3.5)
ALP SERPL-CCNC: 44 U/L (ref 50–136)
ALT SERPL-CCNC: 60 U/L (ref 12–65)
ANION GAP SERPL CALC-SCNC: 8 MMOL/L (ref 4–13)
AST SERPL-CCNC: 20 U/L (ref 15–37)
BASOPHILS # BLD: 0.1 K/UL (ref 0–0.2)
BASOPHILS NFR BLD: 1 % (ref 0–2)
BILIRUB DIRECT SERPL-MCNC: 0.1 MG/DL
BILIRUB SERPL-MCNC: 0.3 MG/DL (ref 0.2–1.1)
BUN SERPL-MCNC: 15 MG/DL (ref 8–23)
CALCIUM SERPL-MCNC: 9.8 MG/DL (ref 8.3–10.4)
CHLORIDE SERPL-SCNC: 105 MMOL/L (ref 101–110)
CHOLEST SERPL-MCNC: 122 MG/DL
CK SERPL-CCNC: 118 U/L (ref 21–215)
CO2 SERPL-SCNC: 26 MMOL/L (ref 21–32)
CREAT SERPL-MCNC: 0.8 MG/DL (ref 0.8–1.5)
CRP SERPL-MCNC: <0.3 MG/DL (ref 0–0.9)
DIFFERENTIAL METHOD BLD: ABNORMAL
EOSINOPHIL # BLD: 0.2 K/UL (ref 0–0.8)
EOSINOPHIL NFR BLD: 3 % (ref 0.5–7.8)
ERYTHROCYTE [DISTWIDTH] IN BLOOD BY AUTOMATED COUNT: 14.5 % (ref 11.9–14.6)
ERYTHROCYTE [SEDIMENTATION RATE] IN BLOOD: 23 MM/HR
EST. AVERAGE GLUCOSE BLD GHB EST-MCNC: 134 MG/DL
GLOBULIN SER CALC-MCNC: 3.1 G/DL (ref 2.3–3.5)
GLUCOSE SERPL-MCNC: 90 MG/DL (ref 65–100)
HBA1C MFR BLD: 6.3 % (ref 4.8–5.6)
HCT VFR BLD AUTO: 47.8 % (ref 41.1–50.3)
HDLC SERPL-MCNC: 50 MG/DL (ref 40–60)
HDLC SERPL: 2.4 {RATIO}
HGB BLD-MCNC: 15.1 G/DL (ref 13.6–17.2)
IMM GRANULOCYTES # BLD AUTO: 0.1 K/UL (ref 0–0.5)
IMM GRANULOCYTES NFR BLD AUTO: 1 % (ref 0–5)
LDLC SERPL CALC-MCNC: 30.8 MG/DL
LYMPHOCYTES # BLD: 0.7 K/UL (ref 0.5–4.6)
LYMPHOCYTES NFR BLD: 10 % (ref 13–44)
MCH RBC QN AUTO: 33 PG (ref 26.1–32.9)
MCHC RBC AUTO-ENTMCNC: 31.6 G/DL (ref 31.4–35)
MCV RBC AUTO: 104.6 FL (ref 79.6–97.8)
MONOCYTES # BLD: 0.7 K/UL (ref 0.1–1.3)
MONOCYTES NFR BLD: 10 % (ref 4–12)
NEUTS SEG # BLD: 5.1 K/UL (ref 1.7–8.2)
NEUTS SEG NFR BLD: 75 % (ref 43–78)
NRBC # BLD: 0 K/UL (ref 0–0.2)
PLATELET # BLD AUTO: 247 K/UL (ref 150–450)
PMV BLD AUTO: 9.8 FL (ref 9.4–12.3)
POTASSIUM SERPL-SCNC: 3.9 MMOL/L (ref 3.5–5.1)
PROT SERPL-MCNC: 7.1 G/DL (ref 6.3–8.2)
PSA SERPL-MCNC: 0.2 NG/ML
RBC # BLD AUTO: 4.57 M/UL (ref 4.23–5.6)
SODIUM SERPL-SCNC: 139 MMOL/L (ref 138–145)
TRIGL SERPL-MCNC: 206 MG/DL (ref 35–150)
VLDLC SERPL CALC-MCNC: 41.2 MG/DL (ref 6–23)
WBC # BLD AUTO: 6.8 K/UL (ref 4.3–11.1)

## 2022-09-16 PROCEDURE — 90674 CCIIV4 VAC NO PRSV 0.5 ML IM: CPT | Performed by: INTERNAL MEDICINE

## 2022-09-16 PROCEDURE — 3044F HG A1C LEVEL LT 7.0%: CPT | Performed by: INTERNAL MEDICINE

## 2022-09-16 PROCEDURE — 90471 IMMUNIZATION ADMIN: CPT | Performed by: INTERNAL MEDICINE

## 2022-09-16 PROCEDURE — 99214 OFFICE O/P EST MOD 30 MIN: CPT | Performed by: INTERNAL MEDICINE

## 2022-09-16 RX ORDER — EZETIMIBE 10 MG/1
10 TABLET ORAL DAILY
Qty: 90 TABLET | Refills: 1 | Status: CANCELLED | OUTPATIENT
Start: 2022-09-16

## 2022-09-16 RX ORDER — EVOLOCUMAB 140 MG/ML
140 INJECTION, SOLUTION SUBCUTANEOUS
Qty: 3 ML | Refills: 3 | Status: CANCELLED | OUTPATIENT
Start: 2022-09-16

## 2022-09-16 RX ORDER — DULAGLUTIDE 1.5 MG/.5ML
1.5 INJECTION, SOLUTION SUBCUTANEOUS
Qty: 3 ML | Refills: 2 | Status: CANCELLED | OUTPATIENT
Start: 2022-09-16

## 2022-09-16 RX ORDER — DULOXETIN HYDROCHLORIDE 30 MG/1
30 CAPSULE, DELAYED RELEASE ORAL DAILY
Qty: 90 CAPSULE | Refills: 1 | Status: SHIPPED | OUTPATIENT
Start: 2022-09-16

## 2022-09-16 RX ORDER — HYDROCHLOROTHIAZIDE 12.5 MG/1
12.5 TABLET ORAL DAILY
Qty: 90 TABLET | Refills: 1 | Status: SHIPPED | OUTPATIENT
Start: 2022-09-16

## 2022-09-16 RX ORDER — LOSARTAN POTASSIUM 100 MG/1
100 TABLET ORAL DAILY
Qty: 90 TABLET | Refills: 1 | Status: SHIPPED | OUTPATIENT
Start: 2022-09-16

## 2022-09-16 RX ORDER — AZATHIOPRINE 50 MG/1
TABLET ORAL
Qty: 90 TABLET | Refills: 1 | Status: SHIPPED | OUTPATIENT
Start: 2022-09-16 | End: 2022-09-28 | Stop reason: SDUPTHER

## 2022-09-16 RX ORDER — METOPROLOL SUCCINATE 25 MG/1
25 TABLET, EXTENDED RELEASE ORAL DAILY
Qty: 90 TABLET | Refills: 1 | Status: CANCELLED | OUTPATIENT
Start: 2022-09-16

## 2022-09-16 RX ORDER — DULAGLUTIDE 3 MG/.5ML
3 INJECTION, SOLUTION SUBCUTANEOUS WEEKLY
Qty: 12 ADJUSTABLE DOSE PRE-FILLED PEN SYRINGE | Refills: 1 | Status: SHIPPED | OUTPATIENT
Start: 2022-09-16

## 2022-09-16 RX ORDER — OMEPRAZOLE 40 MG/1
40 CAPSULE, DELAYED RELEASE ORAL DAILY
Qty: 90 CAPSULE | Refills: 1 | Status: CANCELLED | OUTPATIENT
Start: 2022-09-16

## 2022-09-16 RX ORDER — LEVOTHYROXINE SODIUM 0.2 MG/1
200 TABLET ORAL
Qty: 90 TABLET | Refills: 1 | Status: SHIPPED | OUTPATIENT
Start: 2022-09-16

## 2022-09-16 RX ORDER — AMLODIPINE BESYLATE 5 MG/1
5 TABLET ORAL DAILY
Qty: 90 TABLET | Refills: 1 | Status: CANCELLED | OUTPATIENT
Start: 2022-09-16

## 2022-09-16 SDOH — ECONOMIC STABILITY: FOOD INSECURITY: WITHIN THE PAST 12 MONTHS, YOU WORRIED THAT YOUR FOOD WOULD RUN OUT BEFORE YOU GOT MONEY TO BUY MORE.: NEVER TRUE

## 2022-09-16 SDOH — ECONOMIC STABILITY: FOOD INSECURITY: WITHIN THE PAST 12 MONTHS, THE FOOD YOU BOUGHT JUST DIDN'T LAST AND YOU DIDN'T HAVE MONEY TO GET MORE.: NEVER TRUE

## 2022-09-16 ASSESSMENT — PATIENT HEALTH QUESTIONNAIRE - PHQ9
7. TROUBLE CONCENTRATING ON THINGS, SUCH AS READING THE NEWSPAPER OR WATCHING TELEVISION: 0
SUM OF ALL RESPONSES TO PHQ QUESTIONS 1-9: 0
3. TROUBLE FALLING OR STAYING ASLEEP: 0
10. IF YOU CHECKED OFF ANY PROBLEMS, HOW DIFFICULT HAVE THESE PROBLEMS MADE IT FOR YOU TO DO YOUR WORK, TAKE CARE OF THINGS AT HOME, OR GET ALONG WITH OTHER PEOPLE: 0
SUM OF ALL RESPONSES TO PHQ QUESTIONS 1-9: 0
8. MOVING OR SPEAKING SO SLOWLY THAT OTHER PEOPLE COULD HAVE NOTICED. OR THE OPPOSITE, BEING SO FIGETY OR RESTLESS THAT YOU HAVE BEEN MOVING AROUND A LOT MORE THAN USUAL: 0
4. FEELING TIRED OR HAVING LITTLE ENERGY: 0
2. FEELING DOWN, DEPRESSED OR HOPELESS: 0
6. FEELING BAD ABOUT YOURSELF - OR THAT YOU ARE A FAILURE OR HAVE LET YOURSELF OR YOUR FAMILY DOWN: 0
9. THOUGHTS THAT YOU WOULD BE BETTER OFF DEAD, OR OF HURTING YOURSELF: 0
SUM OF ALL RESPONSES TO PHQ QUESTIONS 1-9: 0
SUM OF ALL RESPONSES TO PHQ QUESTIONS 1-9: 0
1. LITTLE INTEREST OR PLEASURE IN DOING THINGS: 0
5. POOR APPETITE OR OVEREATING: 0
SUM OF ALL RESPONSES TO PHQ9 QUESTIONS 1 & 2: 0

## 2022-09-16 ASSESSMENT — SOCIAL DETERMINANTS OF HEALTH (SDOH): HOW HARD IS IT FOR YOU TO PAY FOR THE VERY BASICS LIKE FOOD, HOUSING, MEDICAL CARE, AND HEATING?: NOT HARD AT ALL

## 2022-09-16 ASSESSMENT — ENCOUNTER SYMPTOMS: SHORTNESS OF BREATH: 0

## 2022-09-16 NOTE — PROGRESS NOTES
Nick Yi M.D. Internal Medicine  [address] [facility] [address] , [address]   Office : [phone]  Fax : [phone]    Chief Complaint   Patient presents with    Diabetes     Follow up       History of Present Illness:  Josy Harmon is a 57 y.o. male. HPI    Diabetes Mellitus  Patient presents  for follow up on diabetes. Onset of symptoms was several years ago. Patient describes symptoms as none. Course to date has been well controlled. Diet and Lifestyle: follows a diabetic diet regularly  exercises sporadically  nonsmoker  Home glucose monitoring:  Results average 100. Patient denies polyuria and polydipsia. No wounds in lower limbs. Most recent HbA1c was   Lab Results   Component Value Date    LABA1C 6.2 (H) 02/25/2022     Lab Results   Component Value Date     02/25/2022       Hypertension  Patient is in for Hypertension which is stable. Diet and Lifestyle: generally follows a low sodium diet  Home BP Monitoring: is not measured at home. Patient's recent blood pressures were   BP Readings from Last 3 Encounters:   09/16/22 133/79   05/13/22 127/80   03/04/22 118/72   . taking medications as instructed, no medication side effects noted, no TIA's, no chest pain on exertion, no dyspnea on exertion, no swelling of ankles. Cardiac risk factors consist of diabetes mellitus, dyslipidemia, hypertension, and male gender. Hyperlipidemia  Patient is in for follow-up for hyperlipidemia. Diet and Lifestyle: nonsmoker. Risk factors for vascular disease consist of hyperlipidemia, hypertension, and diabetes. LAST LDL was   Lab Results   Component Value Date    LDLCALC 31.8 10/29/2021   . Last ALT was   Lab Results   Component Value Date/Time    ALT 30 02/25/2022 10:45 AM   .  No muscle aches. Chronic anticoagulation for recurrent DVT  Had anemia likely related to GI loss.   Xarelto dose is unchanged currently  Lab Results   Component Value Date    IRON 58 01/14/2022    TIBC 348 01/14/2022    FERRITIN 114 01/14/2022     Lab Results   Component Value Date    WBC 6.7 01/14/2022    HGB 14.3 01/14/2022    HCT 42.3 01/14/2022    MCV 96 01/14/2022     01/14/2022          ASHD  Patient denies any angina. On  Repatha and zetia    Polymyositis  His strength is back to his historical baseline. Remains on Immuran and low dose medrol.    He will taper medrol down to 2 mg daily for one month and consider trial off it  Lab Results   Component Value Date    CRP 2 05/13/2022     No results found for: SEDRATE  Lab Results   Component Value Date    CKTOTAL 117 05/13/2022         Past Medical History:  Past Medical History:   Diagnosis Date    Angina pectoris ([address]     Anxiety     ASHD (arteriosclerotic heart disease) 2014    Calcium score = 1056    Bursitis     Cancer (HCC)     basal cell carcinoma    Coronary artery disease     Coronary atherosclerosis due to calcified coronary lesion (CODE) 5/23/2016    Diverticulitis     Duodenal ulcer     DVT (deep venous thrombosis) (HCC)     2 episodes    Elevated liver enzymes 3/1/2018    GERD (gastroesophageal reflux disease)     HTN (hypertension)     Hypercholesterolemia     Hypothyroidism     Kidney stones     Myositis     Personal history of DVT (deep vein thrombosis) 11/18/2016    Protein C deficiency ([address]     Rhabdomyolysis 2018    [facility]    Sleep apnea     using CPAP    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 ([address]     Varicella      Past Surgical History:  Past Surgical History:   Procedure Laterality Date    CARDIAC CATHETERIZATION  2015    COLONOSCOPY  2013    HEENT Right 11/2020    skin lesion bx of ear    OTHER SURGICAL HISTORY  02/2022    dental surgery    URETEROLITHOTOMY Right 2013    WISDOM TOOTH EXTRACTION  1980's     Allergies:   No Known Allergies  Medications:   Current Outpatient Medications   Medication Sig Dispense Refill    azaTHIOprine (IMURAN) 50 MG tablet Take 3 tablets in the morning and 2 tablets at night for a total dose of 250mg daily. 90 tablet 1    DULoxetine (CYMBALTA) 30 MG extended release capsule Take 1 capsule by mouth daily TAKE 1 CAPSULE DAILY 90 capsule 1    empagliflozin (JARDIANCE) 10 MG tablet Take 1 tablet by mouth daily 90 tablet 1    hydroCHLOROthiazide (HYDRODIURIL) 12.5 MG tablet Take 1 tablet by mouth daily TAKE 1 TABLET DAILY 90 tablet 1    levothyroxine (SYNTHROID) 200 MCG tablet Take 1 tablet by mouth every morning (before breakfast) 90 tablet 1    losartan (COZAAR) 100 MG tablet Take 1 tablet by mouth daily TAKE 1 TABLET DAILY 90 tablet 1    metFORMIN (GLUCOPHAGE) 500 MG tablet TAKE 2 TABLETS TWICE A DAY WITH MEALS 360 tablet 1    Dulaglutide (TRULICITY) 3 EF/4.5YP SOPN Inject 3 mg into the skin once a week 12 Adjustable Dose Pre-filled Pen Syringe 1    rivaroxaban (XARELTO) 20 MG TABS tablet Take 1 tablet by mouth daily 90 tablet 3    amLODIPine (NORVASC) 5 MG tablet TAKE 1 TABLET DAILY      ciclopirox (LOPROX) 0.77 % cream Apply topically 2 times daily      Evolocumab (REPATHA SURECLICK) 661 MG/ML SOAJ Inject 140 mg into the skin every 14 days      ezetimibe (ZETIA) 10 MG tablet TAKE 1 TABLET DAILY      fexofenadine (ALLEGRA) 180 MG tablet Take 180 mg by mouth daily      fluticasone (CUTIVATE) 0.05 % cream Apply topically 2 times daily      methylPREDNISolone (MEDROL) 4 MG tablet Take 4 mg by mouth daily (with breakfast)      metoprolol succinate (TOPROL XL) 25 MG extended release tablet TAKE 1 TABLET DAILY      nitroGLYCERIN (NITROSTAT) 0.4 MG SL tablet Place 0.4 mg under the tongue      omeprazole (PRILOSEC) 40 MG delayed release capsule TAKE 1 CAPSULE DAILY      ondansetron (ZOFRAN-ODT) 4 MG disintegrating tablet Take 4 mg by mouth every 8 hours as needed (Patient not taking: Reported on 9/16/2022)       No current facility-administered medications for this visit.      Social History:  Social History     Tobacco Use    Smoking status: Former     Packs/day: 1.00     Years: 15.00     Pack years: 15.00     Types: Cigarettes     Quit date: 2006     Years since quittin.3    Smokeless tobacco: Former     Quit date: 2006   Substance Use Topics    Alcohol use: No     Family History  Family History   Problem Relation Age of Onset    Kidney Disease Son         secondary to congenital vessel disease    Kidney Disease Paternal Uncle     Diabetes Paternal Uncle     Heart Surgery Paternal Uncle         in 76s    Heart Attack Paternal Uncle     Heart Surgery Paternal [de-identified]     Alzheimer's Disease Father     Other Mother         \"valve issue\"    Heart Surgery Father         mid to late 62s    Hypertension Brother     Sleep Apnea Brother     Heart Disease Father     Pulmonary Embolism Mother         protien C deficiency       Review of Systems   Constitutional:  Negative for chills, fatigue and fever. Respiratory:  Negative for shortness of breath. Cardiovascular:  Negative for chest pain. Musculoskeletal:  Negative for myalgias. Skin:  Negative for rash. Neurological:  Negative for speech difficulty. Psychiatric/Behavioral:  Negative for dysphoric mood. Vital Signs  /79 (Site: Left Upper Arm, Position: Sitting, Cuff Size: Large Adult)   Pulse 65   Temp 97.2 °F (36.2 °C) (Temporal)   Ht 5' (1.524 m)   Wt 251 lb (113.9 kg)   SpO2 98%   BMI 49.02 kg/m²   Body mass index is 49.02 kg/m². Physical Exam  Vitals reviewed. Constitutional:       General: He is not in acute distress. Appearance: Normal appearance. He is not ill-appearing. HENT:      Head: Normocephalic and atraumatic. Eyes:      General: No scleral icterus. Extraocular Movements: Extraocular movements intact. Conjunctiva/sclera: Conjunctivae normal.   Neck:      Vascular: No carotid bruit. Cardiovascular:      Rate and Rhythm: Normal rate and regular rhythm. Heart sounds: Normal heart sounds. No murmur heard.   Pulmonary:      Effort: Pulmonary effort is normal. Breath sounds: Normal breath sounds. Musculoskeletal:         General: No swelling. Skin:     Coloration: Skin is not jaundiced. Findings: No rash. Neurological:      General: No focal deficit present. Mental Status: He is alert. Mental status is at baseline. Cranial Nerves: No cranial nerve deficit. Motor: No weakness. Gait: Gait normal.   Psychiatric:         Mood and Affect: Mood normal.         Behavior: Behavior normal.         Thought Content: Thought content normal.         Judgment: Judgment normal.         Assessment/Plan:  Caio Hawkins was seen today for diabetes. Diagnoses and all orders for this visit:    Type 2 diabetes mellitus without complication, without long-term current use of insulin ([facility])  -     empagliflozin (JARDIANCE) 10 MG tablet; Take 1 tablet by mouth daily  -     metFORMIN (GLUCOPHAGE) 500 MG tablet; TAKE 2 TABLETS TWICE A DAY WITH MEALS  -     Dulaglutide (TRULICITY) 3 PW/3.1BD SOPN; Inject 3 mg into the skin once a week  -     Hemoglobin A1C; Future    Need for prophylactic vaccination and inoculation against cholera alone  -     Influenza, FLUCELVAX, (age 10 mo+), IM, Preservative Free, 0.5 mL  -     PSA Screening; Future    Other hyperlipidemia  -     Hepatic Function Panel; Future  -     Lipid Panel; Future    Essential hypertension  -     hydroCHLOROthiazide (HYDRODIURIL) 12.5 MG tablet; Take 1 tablet by mouth daily TAKE 1 TABLET DAILY  -     losartan (COZAAR) 100 MG tablet; Take 1 tablet by mouth daily TAKE 1 TABLET DAILY  -     CBC with Auto Differential; Future  -     Basic Metabolic Panel; Future    Chronic anticoagulation    ASHD (arteriosclerotic heart disease)    Polymyositis (HCC)  -     azaTHIOprine (IMURAN) 50 MG tablet; Take 3 tablets in the morning and 2 tablets at night for a total dose of 250mg daily. -     C-Reactive Protein; Future  -     Sedimentation Rate; Future  -     CK;  Future    Dysthymic disorder  -     DULoxetine (CYMBALTA) 30 MG extended release capsule; Take 1 capsule by mouth daily TAKE 1 CAPSULE DAILY    Acquired hypothyroidism  -     levothyroxine (SYNTHROID) 200 MCG tablet; Take 1 tablet by mouth every morning (before breakfast)    Screening for prostate cancer      Return in about 4 months (around 1/16/2023), or if symptoms worsen or fail to improve.   __  Ofelia Perez M.D.

## 2022-09-26 DIAGNOSIS — M33.20 POLYMYOSITIS (HCC): ICD-10-CM

## 2022-09-28 RX ORDER — AZATHIOPRINE 50 MG/1
TABLET ORAL
Qty: 450 TABLET | Refills: 1 | Status: SHIPPED | OUTPATIENT
Start: 2022-09-28

## 2022-10-14 ENCOUNTER — PATIENT MESSAGE (OUTPATIENT)
Dept: CARDIOLOGY CLINIC | Age: 62
End: 2022-10-14

## 2022-10-17 RX ORDER — EVOLOCUMAB 140 MG/ML
140 INJECTION, SOLUTION SUBCUTANEOUS
Qty: 2 ADJUSTABLE DOSE PRE-FILLED PEN SYRINGE | Refills: 0 | Status: SHIPPED | OUTPATIENT
Start: 2022-10-17

## 2022-10-17 NOTE — TELEPHONE ENCOUNTER
From: Rik Govea  To: Dr. Tram High: 10/14/2022 12:33 PM EDT  Subject: Author Limon    My prescription for Author Limon has no refills and I can't request one through [employer]. What do I need to do?

## 2022-11-07 ENCOUNTER — OFFICE VISIT (OUTPATIENT)
Dept: CARDIOLOGY CLINIC | Age: 62
End: 2022-11-07
Payer: COMMERCIAL

## 2022-11-07 VITALS
HEART RATE: 72 BPM | HEIGHT: 60 IN | WEIGHT: 247 LBS | BODY MASS INDEX: 48.49 KG/M2 | DIASTOLIC BLOOD PRESSURE: 78 MMHG | SYSTOLIC BLOOD PRESSURE: 148 MMHG

## 2022-11-07 DIAGNOSIS — Z78.9 STATIN INTOLERANCE: ICD-10-CM

## 2022-11-07 DIAGNOSIS — I25.10 ATHEROSCLEROSIS OF NATIVE CORONARY ARTERY OF NATIVE HEART WITHOUT ANGINA PECTORIS: Primary | ICD-10-CM

## 2022-11-07 DIAGNOSIS — E78.5 DYSLIPIDEMIA: ICD-10-CM

## 2022-11-07 DIAGNOSIS — E11.9 TYPE 2 DIABETES MELLITUS WITHOUT COMPLICATION, WITHOUT LONG-TERM CURRENT USE OF INSULIN (HCC): ICD-10-CM

## 2022-11-07 DIAGNOSIS — E66.01 MORBID OBESITY (HCC): ICD-10-CM

## 2022-11-07 DIAGNOSIS — I10 ESSENTIAL HYPERTENSION: ICD-10-CM

## 2022-11-07 PROCEDURE — 99214 OFFICE O/P EST MOD 30 MIN: CPT | Performed by: INTERNAL MEDICINE

## 2022-11-07 PROCEDURE — 3074F SYST BP LT 130 MM HG: CPT | Performed by: INTERNAL MEDICINE

## 2022-11-07 PROCEDURE — 93000 ELECTROCARDIOGRAM COMPLETE: CPT | Performed by: INTERNAL MEDICINE

## 2022-11-07 PROCEDURE — 3078F DIAST BP <80 MM HG: CPT | Performed by: INTERNAL MEDICINE

## 2022-11-07 PROCEDURE — 3044F HG A1C LEVEL LT 7.0%: CPT | Performed by: INTERNAL MEDICINE

## 2022-11-07 RX ORDER — LOSARTAN POTASSIUM 100 MG/1
100 TABLET ORAL DAILY
Qty: 90 TABLET | Refills: 3 | Status: SHIPPED | OUTPATIENT
Start: 2022-11-07

## 2022-11-07 RX ORDER — EVOLOCUMAB 140 MG/ML
140 INJECTION, SOLUTION SUBCUTANEOUS
Qty: 6 ADJUSTABLE DOSE PRE-FILLED PEN SYRINGE | Refills: 3 | Status: SHIPPED | OUTPATIENT
Start: 2022-11-07 | End: 2022-11-07 | Stop reason: SDUPTHER

## 2022-11-07 RX ORDER — HYDROCHLOROTHIAZIDE 12.5 MG/1
12.5 TABLET ORAL DAILY
Qty: 90 TABLET | Refills: 3 | Status: SHIPPED | OUTPATIENT
Start: 2022-11-07

## 2022-11-07 ASSESSMENT — ENCOUNTER SYMPTOMS: SHORTNESS OF BREATH: 0

## 2022-11-07 NOTE — PATIENT INSTRUCTIONS
Patient Education        Learning About the Mediterranean Diet  What is the [address] [facility]? The Mediterranean diet is a style of eating rather than a diet plan. It features foods eaten in [place], [place], [place], and other countries along the [facility]. It emphasizes eating foods like fish, fruits, vegetables, beans, high-fiber breads and whole grains, nuts, and olive oil. This style of eating includes limited red meat, cheese, and sweets. Why choose the Mediterranean diet? A Mediterranean-style diet may improve heart health. It contains more fat than other heart-healthy diets. But the fats are mainly from nuts, unsaturated oils (such as fish oils and olive oil), and certain nut or seed oils (such as canola, soybean, or flaxseed oil). These fats may help protect the heart and blood vessels. How can you get started on the Mediterranean diet? Here are some things you can do to switch to a more Mediterranean way of eating. What to eat  Eat a variety of fruits and vegetables each day, such as grapes, blueberries, tomatoes, broccoli, peppers, figs, olives, spinach, eggplant, beans, lentils, and chickpeas. Eat a variety of whole-grain foods each day, such as oats, brown rice, and whole wheat bread, pasta, and couscous. Eat fish at least 2 times a week. Try tuna, salmon, mackerel, lake trout, herring, or sardines. Eat moderate amounts of low-fat dairy products, such as milk, cheese, or yogurt. Eat moderate amounts of poultry and eggs. Choose healthy (unsaturated) fats, such as nuts, olive oil, and certain nut or seed oils like canola, soybean, and flaxseed. Limit unhealthy (saturated) fats, such as butter, palm oil, and coconut oil. And limit fats found in animal products, such as meat and dairy products made with whole milk. Try to eat red meat only a few times a month in very small amounts. Limit sweets and desserts to only a few times a week.  This includes sugar-sweetened drinks like soda. The Mediterranean diet may also include red wine with your meal--1 glass each day for women and up to 2 glasses a day for men. Tips for eating at home  Use herbs, spices, garlic, lemon zest, and citrus juice instead of salt to add flavor to foods. Add avocado slices to your sandwich instead of drake. Have fish for lunch or dinner instead of red meat. Brush the fish with olive oil, and broil or grill it. Sprinkle your salad with seeds or nuts instead of cheese. Cook with olive or canola oil instead of butter or oils that are high in saturated fat. Switch from 2% milk or whole milk to 1% or fat-free milk. Dip raw vegetables in a vinaigrette dressing or hummus instead of dips made from mayonnaise or sour cream.  Have a piece of fruit for dessert instead of a piece of cake. Try baked apples, or have some dried fruit. Tips for eating out  Try broiled, grilled, baked, or poached fish instead of having it fried or breaded. Ask your  to have your meals prepared with olive oil instead of butter. Order dishes made with marinara sauce or sauces made from olive oil. Avoid sauces made from cream or mayonnaise. Choose whole-grain breads, whole wheat pasta and pizza crust, brown rice, beans, and lentils. Cut back on butter or margarine on bread. Instead, you can dip your bread in a small amount of olive oil. Ask for a side salad or grilled vegetables instead of french fries or chips. Where can you learn more? Go to https://[employer]. org and sign in to your [employer] account. Enter [phone] in the [facility] box to learn more about \"Learning About the Mediterranean Diet. \"     If you do not have an account, please click on the \"Sign Up Now\" link. Current as of: May 9, 2022               Content Version: 13.4  © 6928-3474 [employer]. Care instructions adapted under license by [facility] ([facility]).  If you have questions about a medical condition or this instruction, always ask your healthcare professional. [place] [address] any warranty or liability for your use of this information.

## 2022-11-07 NOTE — PROGRESS NOTES
[facility], [place] CARDIOLOGY  [address], [address]  PHONE: [phone]      22    NAME:  Luis Whitfield  : 1960  MRN: [id number]         SUBJECTIVE:   Luis Whitfield is a 57 y.o. male seen for a follow up visit regarding the following:     Chief Complaint   Patient presents with    Coronary Artery Disease    Annual Exam            HPI:  Follow up  Coronary Artery Disease and Annual Exam   .    Follow up CAD, hypertension, dyslipidemia, statin intolerance (polymyositis)  LDL looked well, his TG were elevated but he was not fasting and admits he had been eating some fatty foods. He's tried to do better with his diet. His biggest problem is bedtime snacks, feels he sleeps better. Mostly snacking on carbs, not sweets but carbs. He does not check BP at home but hasn't had meds this morning and drank coffee. Denies cp or dyspnea. Still not exercising much. He enjoys working in his shop, fishing and playing with his grandkids (36, and 9years old)-sees them all the time, they live next door. 22 133/79  22 127/80  22 118/72            Past cardiac history:   2018 - perfusion study with diaphragm artifact   Echo - EF 57%, indeterminate DF, no valve disease. chronic appearing partial clot in the popliteal artery, no DVT. - Xarelto. Coronary  calcium score 1056, but at cath all extraluminal calcium with patent arteries. Not on statin therapy due to  dx polymyositis with CK > 20,000. He was approved for Repatha therapy   Mar 2020- diaphragm and apical artifact, no ischemia, normal EF. 6 minutes. Resting /80. Key CAD CHF Meds            Evolocumab (REPATHA SURECLICK) 958 MG/ML SOAJ (Taking)    Sig - Route: Inject 140 mg into the skin every 14 days Due For Follow-Up - SubCUTAneous    rivaroxaban (XARELTO) 20 MG TABS tablet (Taking)    Sig - Route:  Take 1 tablet by mouth daily - Oral    losartan (COZAAR) 100 MG tablet (Taking)    Sig - Route: Take 1 tablet by mouth daily TAKE 1 TABLET DAILY - Oral    hydroCHLOROthiazide (HYDRODIURIL) 12.5 MG tablet (Taking)    Sig - Route: Take 1 tablet by mouth daily TAKE 1 TABLET DAILY - Oral    amLODIPine (NORVASC) 5 MG tablet (Taking)    Class: Historical Med    ezetimibe (ZETIA) 10 MG tablet (Taking)    Class: Historical Med    metoprolol succinate (TOPROL XL) 25 MG extended release tablet (Taking)    Class: Historical Med              Past Medical History, Past Surgical History, Family history, Social History, and Medications were all reviewed with the patient today and updated as necessary. Prior to Admission medications    Medication Sig Start Date End Date Taking? Authorizing Provider   Evolocumab (REPATHA SURECLICK) 245 MG/ML SOAJ Inject 140 mg into the skin every 14 days Due For Follow-Up 11/7/22  Yes Abdirahman Alvarado MD   rivaroxaban (XARELTO) 20 MG TABS tablet Take 1 tablet by mouth daily 11/7/22  Yes Abdirahman Alvarado MD   losartan (COZAAR) 100 MG tablet Take 1 tablet by mouth daily TAKE 1 TABLET DAILY 11/7/22  Yes Abdirahman Alvarado MD   hydroCHLOROthiazide (HYDRODIURIL) 12.5 MG tablet Take 1 tablet by mouth daily TAKE 1 TABLET DAILY 11/7/22  Yes Abdirahman Alvarado MD   azaTHIOprine (IMURAN) 50 MG tablet Take 3 tablets in the morning and 2 tablets at night for a total dose of 250mg daily.  9/28/22  Yes Waldo Cheung MD   DULoxetine (CYMBALTA) 30 MG extended release capsule Take 1 capsule by mouth daily TAKE 1 CAPSULE DAILY 9/16/22  Yes Waldo Cheung MD   empagliflozin (JARDIANCE) 10 MG tablet Take 1 tablet by mouth daily 9/16/22  Yes Waldo Cheung MD   levothyroxine (SYNTHROID) 200 MCG tablet Take 1 tablet by mouth every morning (before breakfast) 9/16/22  Yes Waldo Cheung MD   metFORMIN (GLUCOPHAGE) 500 MG tablet TAKE 2 TABLETS TWICE A DAY WITH MEALS 9/16/22  Yes Waldo Cheung MD   Dulaglutide (TRULICITY) 3 KW/6.0TZ [facility] Inject 3 mg into the skin once a week 9/16/22  Yes Hugh Solano MD   amLODIPine (NORVASC) 5 MG tablet TAKE 1 TABLET DAILY 1/17/22  Yes Ar Automatic Reconciliation   ciclopirox (LOPROX) 0.77 % cream Apply topically 2 times daily 11/21/19  Yes Ar Automatic Reconciliation   ezetimibe (ZETIA) 10 MG tablet TAKE 1 TABLET DAILY 12/6/21  Yes Ar Automatic Reconciliation   fexofenadine (ALLEGRA) 180 MG tablet Take 180 mg by mouth daily   Yes Ar Automatic Reconciliation   fluticasone (CUTIVATE) 0.05 % cream Apply topically daily   Yes Ar Automatic Reconciliation   methylPREDNISolone (MEDROL) 4 MG tablet Take 4 mg by mouth daily (with breakfast) 1/14/22  Yes Ar Automatic Reconciliation   metoprolol succinate (TOPROL XL) 25 MG extended release tablet TAKE 1 TABLET DAILY 1/17/22  Yes Ar Automatic Reconciliation   nitroGLYCERIN (NITROSTAT) 0.4 MG SL tablet Place 0.4 mg under the tongue every 5 minutes as needed 9/25/20  Yes Ar Automatic Reconciliation   omeprazole (PRILOSEC) 40 MG delayed release capsule TAKE 1 CAPSULE DAILY 1/14/22  Yes Ar Automatic Reconciliation   ondansetron (ZOFRAN-ODT) 4 MG disintegrating tablet Take 4 mg by mouth every 8 hours as needed 6/14/21  Yes Ar Automatic Reconciliation     No Known Allergies  Past Medical History:   Diagnosis Date    Angina pectoris ([address]     Anxiety     ASHD (arteriosclerotic heart disease) 2014    Calcium score = 1056    Bursitis     Cancer (HCC)     basal cell carcinoma    Coronary artery disease     Coronary atherosclerosis due to calcified coronary lesion (CODE) 5/23/2016    Diverticulitis     Duodenal ulcer     DVT (deep venous thrombosis) (HCC)     2 episodes    Elevated liver enzymes 3/1/2018    GERD (gastroesophageal reflux disease)     HTN (hypertension)     Hypercholesterolemia     Hypothyroidism     Kidney stones     Myositis     Personal history of DVT (deep vein thrombosis) 11/18/2016    Protein C deficiency ([address]     Rhabdomyolysis 2018    [facility] DT Sleep apnea     using CPAP    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 ([facility] [address] [facility]     Varicella      Past Surgical History:   Procedure Laterality Date    CARDIAC CATHETERIZATION  2015    COLONOSCOPY  2013    HEENT Right 2020    skin lesion bx of ear    OTHER SURGICAL HISTORY  2022    dental surgery    URETEROLITHOTOMY Right 2013    WISDOM TOOTH EXTRACTION  's     Family History   Problem Relation Age of Onset    Kidney Disease Son         secondary to congenital vessel disease    Kidney Disease Paternal Uncle     Diabetes Paternal Uncle     Heart Surgery Paternal Uncle         in 76s    Heart Attack Paternal Uncle     Heart Surgery Paternal [de-identified]     Alzheimer's Disease Father     Other Mother         \"valve issue\"    Heart Surgery Father         mid to late 62s    Hypertension Brother     Sleep Apnea Brother     Heart Disease Father     Pulmonary Embolism Mother         protien C deficiency     Social History     Tobacco Use    Smoking status: Former     Packs/day: 1.00     Years: 15.00     Pack years: 15.00     Types: Cigarettes     Quit date: 2006     Years since quittin.5    Smokeless tobacco: Former     Quit date: 2006   Substance Use Topics    Alcohol use: No       ROS:    Review of Systems   Cardiovascular:  Negative for chest pain. Respiratory:  Negative for shortness of breath. PHYSICAL EXAM:   BP (!) 148/78   Pulse 72   Ht 5' (1.524 m)   Wt 247 lb (112 kg)   BMI 48.24 kg/m²      Wt Readings from Last 3 Encounters:   22 247 lb (112 kg)   22 251 lb (113.9 kg)   22 247 lb 3.2 oz (112.1 kg)     BP Readings from Last 3 Encounters:   22 (!) 148/78   22 133/79   22 127/80     Pulse Readings from Last 3 Encounters:   22 72   22 65   22 72           Physical Exam  Constitutional:       Appearance: Normal appearance. HENT:      Head: Normocephalic and atraumatic.    Eyes:      Conjunctiva/sclera: Conjunctivae normal.   Neck:      Vascular: No carotid bruit. Cardiovascular:      Rate and Rhythm: Normal rate and regular rhythm. Heart sounds: No murmur heard. No friction rub. No gallop. Pulmonary:      Effort: No respiratory distress. Breath sounds: No wheezing or rales. Musculoskeletal:         General: No swelling. Cervical back: Neck supple. Comments: Severe bilateral varicosities   Skin:     General: Skin is warm and dry. Neurological:      General: No focal deficit present. Mental Status: He is alert. Psychiatric:         Mood and Affect: Mood normal.         Behavior: Behavior normal.       Medical problems and test results were reviewed with the patient today.      DATA REVIEW    LIPID PANEL     Lab Results   Component Value Date    CHOL 122 09/16/2022    CHOL 102 10/29/2021    CHOL 143 09/25/2020     Lab Results   Component Value Date    TRIG 206 (H) 09/16/2022    TRIG 116 10/29/2021    TRIG 101 09/25/2020     Lab Results   Component Value Date    HDL 50 09/16/2022    HDL 47 10/29/2021    HDL 51 09/25/2020     Lab Results   Component Value Date    LDLCALC 30.8 09/16/2022    LDLCALC 31.8 10/29/2021    LDLCALC 71.8 09/25/2020     Lab Results   Component Value Date    LABVLDL 41.2 (H) 09/16/2022    LABVLDL 23.2 (H) 10/29/2021    LABVLDL 20.2 09/25/2020     Lab Results   Component Value Date    CHOLHDLRATIO 2.4 09/16/2022    CHOLHDLRATIO 2.2 10/29/2021    CHOLHDLRATIO 2.8 09/25/2020       BMP  Lab Results   Component Value Date/Time     09/16/2022 10:31 AM    K 3.9 09/16/2022 10:31 AM     09/16/2022 10:31 AM    CO2 26 09/16/2022 10:31 AM    BUN 15 09/16/2022 10:31 AM    CREATININE 0.80 09/16/2022 10:31 AM    GLUCOSE 90 09/16/2022 10:31 AM    CALCIUM 9.8 09/16/2022 10:31 AM      Hemoglobin A1C   Date Value Ref Range Status   09/16/2022 6.3 (H) 4.8 - 5.6 % Final       EKG    Sinus  Rhythm 72  normal axis, intervals, ST and T waves  WITHIN NORMAL LIMITS    CXR/IMAGING        DEVICE INTERROGATION        OUTSIDE RECORDS REVIEW    Records from outside providers have been reviewed and summarized as noted in the HPI, past history and data review sections of this note, and reviewed with patient. .       ASSESSMENT and PLAN    Jose Bangura was seen today for coronary artery disease and annual exam.    Diagnoses and all orders for this visit:    Atherosclerosis of native coronary artery of native heart without angina pectoris  -     EKG 12 Lead    Essential hypertension  -     losartan (COZAAR) 100 MG tablet; Take 1 tablet by mouth daily TAKE 1 TABLET DAILY  -     hydroCHLOROthiazide (HYDRODIURIL) 12.5 MG tablet; Take 1 tablet by mouth daily TAKE 1 TABLET DAILY    Dyslipidemia    Type 2 diabetes mellitus without complication, without long-term current use of insulin (HCC)    Morbid obesity (HCC)    Other orders  -     Evolocumab (REPATHA SURECLICK) 229 MG/ML SOAJ; Inject 140 mg into the skin every 14 days Due For Follow-Up  -     rivaroxaban (XARELTO) 20 MG TABS tablet; Take 1 tablet by mouth daily        IMPRESSION:    Asymptomatic but still struggles with lifestyle modification. Patient is encouraged to engage in moderate physical activity for at least 30 minutes on at least 6 days of the week, and to follow a diet rich in whole grains, fruits and vegetables, proven to reduce the incidence of events related to cardiovascular disease. For more detailed information, patient is referred to www.cardiosmart.org. Aim for a minimum of 10 lbs off by next visit, look for fun ways to be more active and track it objectively with fitness tracker. Lipids at goal, continue meds, statin intolerant      BP typically at target, continue to follow on current meds    DM well controlled on good regimen          Return in about 1 year (around 11/7/2023). Thank you for allowing me to participate in this patient's care. Please call or contact me if there are any questions or concerns regarding the above. Crispin Hammer MD  11/07/22  10:11 AM

## 2022-11-08 RX ORDER — EVOLOCUMAB 140 MG/ML
140 INJECTION, SOLUTION SUBCUTANEOUS
Qty: 6 ADJUSTABLE DOSE PRE-FILLED PEN SYRINGE | Refills: 3 | Status: SHIPPED | OUTPATIENT
Start: 2022-11-08

## 2022-11-28 RX ORDER — METHYLPREDNISOLONE 4 MG/1
TABLET ORAL
Qty: 90 TABLET | Refills: 3 | OUTPATIENT
Start: 2022-11-28

## 2022-12-01 RX ORDER — EZETIMIBE 10 MG/1
TABLET ORAL
Qty: 90 TABLET | Refills: 3 | Status: SHIPPED | OUTPATIENT
Start: 2022-12-01

## 2022-12-06 ENCOUNTER — PATIENT MESSAGE (OUTPATIENT)
Dept: CARDIOLOGY CLINIC | Age: 62
End: 2022-12-06

## 2022-12-09 ENCOUNTER — CLINICAL DOCUMENTATION (OUTPATIENT)
Dept: CARDIOLOGY CLINIC | Age: 62
End: 2022-12-09

## 2022-12-19 ENCOUNTER — OFFICE VISIT (OUTPATIENT)
Dept: SLEEP MEDICINE | Age: 62
End: 2022-12-19
Payer: COMMERCIAL

## 2022-12-19 VITALS
DIASTOLIC BLOOD PRESSURE: 60 MMHG | HEIGHT: 67 IN | SYSTOLIC BLOOD PRESSURE: 130 MMHG | OXYGEN SATURATION: 99 % | RESPIRATION RATE: 18 BRPM | TEMPERATURE: 97.2 F | HEART RATE: 95 BPM | WEIGHT: 252.3 LBS | BODY MASS INDEX: 39.6 KG/M2

## 2022-12-19 DIAGNOSIS — E66.9 OBESITY (BMI 30-39.9): ICD-10-CM

## 2022-12-19 DIAGNOSIS — G47.33 OSA (OBSTRUCTIVE SLEEP APNEA): Primary | ICD-10-CM

## 2022-12-19 PROCEDURE — 99214 OFFICE O/P EST MOD 30 MIN: CPT | Performed by: INTERNAL MEDICINE

## 2022-12-19 PROCEDURE — 3074F SYST BP LT 130 MM HG: CPT | Performed by: INTERNAL MEDICINE

## 2022-12-19 PROCEDURE — 3078F DIAST BP <80 MM HG: CPT | Performed by: INTERNAL MEDICINE

## 2022-12-19 ASSESSMENT — SLEEP AND FATIGUE QUESTIONNAIRES
HOW LIKELY ARE YOU TO NOD OFF OR FALL ASLEEP WHEN YOU ARE A PASSENGER IN A CAR FOR AN HOUR WITHOUT A BREAK: 0
HOW LIKELY ARE YOU TO NOD OFF OR FALL ASLEEP WHILE SITTING QUIETLY AFTER LUNCH WITHOUT ALCOHOL: 0
HOW LIKELY ARE YOU TO NOD OFF OR FALL ASLEEP IN A CAR, WHILE STOPPED FOR A FEW MINUTES IN TRAFFIC: 0
HOW LIKELY ARE YOU TO NOD OFF OR FALL ASLEEP WHILE LYING DOWN TO REST IN THE AFTERNOON WHEN CIRCUMSTANCES PERMIT: 1
HOW LIKELY ARE YOU TO NOD OFF OR FALL ASLEEP WHILE SITTING AND TALKING TO SOMEONE: 0
HOW LIKELY ARE YOU TO NOD OFF OR FALL ASLEEP WHILE WATCHING TV: 0
HOW LIKELY ARE YOU TO NOD OFF OR FALL ASLEEP WHILE SITTING INACTIVE IN A PUBLIC PLACE: 0
ESS TOTAL SCORE: 1
HOW LIKELY ARE YOU TO NOD OFF OR FALL ASLEEP WHILE SITTING AND READING: 0

## 2022-12-19 NOTE — ASSESSMENT & PLAN NOTE
Patient is doing very well on stable CPAP therapy CPAP 15 cm H2O, download reviewed in detail no concerns today, supplies given and we will follow-up in 1 year, sooner if needed. Weight loss efforts discussed and should he lose 20 to 30 pounds we can reevaluate for improvement or pressure changes.

## 2022-12-19 NOTE — PATIENT INSTRUCTIONS
It was a pleasure meeting you today. Here are some items that we discussed:    1. Great job using CPAP, we will send in supplies to [facility] [place] [facility]  [phone]     Please continue to use your CPAPYou are getting a good response with it. To get the best benefits from CPAP therapy you will need to use your device for all periods of sleep, this includes naps. Please do not drive if you are sleepy. I will see you back in 12 months. See your Durable Medical Equipment (DME) provider:  For any mask fit issues and equipment replacement  Use it every time you go to sleep, day or night. Replace your mask cushion, headgear, and filters regularly. Clean your mask daily and the rest every one to two weeks. If any other issues or questions about your use of CPAP, mask, or pressure, arise, please call your medical equipment company. If you are still having issues, please  use [employer] or call to contact our sleep clinic so that we can assist you. If you have issues that are not about your CPAP (example: medication refills or side effects), please call the  at [phone]. Please work on maintaining a healthy weight. A BMI <30 is considered a healthy weight. Current Body mass index is 39.52 kg/m². . If you are overweight, a loss of 2 pounds per month still comes out to about 25 pounds per year and could allow a reduction in CPAP pressure or even discontinuation of CPAP.         Cleaning instructions      Daily:  Cushion on mask: wash with soap/water OR wipe with an alcohol-free baby wipe    Once per Week:  Mask (frame/headgear): wash with soap/water   Filter: check for cleanliness, Replace (do not wash) monthly, or more often whenever ann/dirty    Twice a month   Water Tub / Tubing: soak for 20-30 minutes in solution then rinse          Soaking Options: 1) water/vinegar solution (3 parts water, 1 part vinegar)     Some companies may tell you to add a little bit of bleach. However I find that bleach to be too toxic and difficult to rinse adequately. Supply Replacement Schedule (what insurance will cover)    You may not need to replace all parts this frequently if you are doing proper cleaning. Filters: 2 per month  Nasal Cushion/Pillow: 2 per month  Full Face cushion: 1 per month  Tubin every 3 months  Full Mask: 1 every 6 months  Headgear: 1 every 6 months  Water Chamber: 1 every 6 months  Chinstrap: 1 every 6 months      Many patients struggle to find comfortable heat and humidity settings on their device. If you are having issues with condensation in your mask or tubing, it usually means your temperature is too low, and/or your humidity is too high. If you are experiencing nasal congestion, it usually means you would benefit from higher humidity setting. Most people on nasal pillows are more comfortable if the heat and humidity are relatively high, such as 84-86 degrees, and 5-6 humidity. You can adjust these settings yourself to find what is comfortable for you. You can always call us or your equipment company for help, as well. Contact your equipment vendor for replacement supplies whenever in need.

## 2022-12-19 NOTE — PROGRESS NOTES
[address] [facility] [address] [facility], [address] [facility]  [phone]    PATIENT ID    Mr. Clare Armenta  1960  His primary provider is Roel Crespo MD    CC: Mr. Belia Nina returns to the clinic today for follow up of his obstructive sleep apnea. INTERVAL HISTORY  Mr. Belia Nina was originally diagnosed with severe obstructive sleep apnea in 2004 with an AHI of 84 and a low SPO2 of 79%. He has past medical history notable for hypertension/hyperlipidemia, coronary artery disease, hypothyroidism, polymyositis on maintenance steroids, history of DVT/protein C deficiency, type 2 diabetes mellitus, obesity with a BMI of 39, anxiety. I am meeting him for the first time today. He notes he is here just for a routine follow-up, no significant health changes over the past year and is otherwise doing well falling asleep quickly and feeling rested in the morning he denies any headaches or nasal discomfort. He utilizes nasal cushion masks. He denies any known snoring when using therapy. No daytime sleepiness. He was last seen by Dr. Zulema Lakhani in 4/21 and was compliant and getting good clinical benefits from CPAP therapy. Since implementing CPAP therapy Mr. Belia Nina feels more rested and less fatigued. On CPAP download review today he is utilizing his CPAP machine 100% of the time, greater than 4 hours per night, for a median daily usage of 8 hours 25 minutes per night. His AHI is down to less than 1 and his leak rate is acceptable most nights. Past medical and surgical histories, medications and allergies, problem list, and social history were reviewed. ROS   Review of systems was otherwise negative unless noted in HPI.   Sleep Medicine 12/19/2022 4/1/2021   Sitting and reading 0 1   Watching TV 0 1   Sitting, inactive in a public place (e.g. a theatre or a meeting) 0 0   As a passenger in a car for an hour without a break 0 0   Lying down to rest in the afternoon when circumstances permit 1 3   Sitting and talking to someone 0 0   Sitting quietly after a lunch without alcohol 0 0   In a car, while stopped for a few minutes in traffic 0 0   [place] Sleepiness Score 1 5           MEDS  Current Outpatient Medications   Medication Sig Dispense Refill    ezetimibe (ZETIA) 10 MG tablet TAKE 1 TABLET DAILY 90 tablet 3    Evolocumab (REPATHA SURECLICK) 307 MG/ML SOAJ Inject 140 mg into the skin every 14 days 6 Adjustable Dose Pre-filled Pen Syringe 3    rivaroxaban (XARELTO) 20 MG TABS tablet Take 1 tablet by mouth daily 90 tablet 3    losartan (COZAAR) 100 MG tablet Take 1 tablet by mouth daily TAKE 1 TABLET DAILY 90 tablet 3    hydroCHLOROthiazide (HYDRODIURIL) 12.5 MG tablet Take 1 tablet by mouth daily TAKE 1 TABLET DAILY 90 tablet 3    azaTHIOprine (IMURAN) 50 MG tablet Take 3 tablets in the morning and 2 tablets at night for a total dose of 250mg daily.  450 tablet 1    DULoxetine (CYMBALTA) 30 MG extended release capsule Take 1 capsule by mouth daily TAKE 1 CAPSULE DAILY 90 capsule 1    empagliflozin (JARDIANCE) 10 MG tablet Take 1 tablet by mouth daily 90 tablet 1    levothyroxine (SYNTHROID) 200 MCG tablet Take 1 tablet by mouth every morning (before breakfast) 90 tablet 1    metFORMIN (GLUCOPHAGE) 500 MG tablet TAKE 2 TABLETS TWICE A DAY WITH MEALS 360 tablet 1    Dulaglutide (TRULICITY) 3 XW/3.8DJ SOPN Inject 3 mg into the skin once a week 12 Adjustable Dose Pre-filled Pen Syringe 1    amLODIPine (NORVASC) 5 MG tablet TAKE 1 TABLET DAILY      ciclopirox (LOPROX) 0.77 % cream Apply topically 2 times daily      fexofenadine (ALLEGRA) 180 MG tablet Take 180 mg by mouth daily      fluticasone (CUTIVATE) 0.05 % cream Apply topically daily      methylPREDNISolone (MEDROL) 4 MG tablet Take 4 mg by mouth daily (with breakfast)      metoprolol succinate (TOPROL XL) 25 MG extended release tablet TAKE 1 TABLET DAILY      nitroGLYCERIN (NITROSTAT) 0.4 MG SL tablet Place 0.4 mg under the tongue every 5 minutes as needed      omeprazole (PRILOSEC) 40 MG delayed release capsule TAKE 1 CAPSULE DAILY       No current facility-administered medications for this visit. ALLERGIES  Allergies   Allergen Reactions    Statins Myalgia       OBJECTIVE   /60   Pulse 95   Temp 97.2 °F (36.2 °C)   Resp 18   Ht 5' 7\" (1.702 m)   Wt 252 lb 4.8 oz (114.4 kg)   SpO2 99%   BMI 39.52 kg/m²         Exam:  Constitutional: Pleasant 60 y.o. male, appears well-developed and well-nourished. Not in acute distress. Eyes: Conjunctivae and lids appear normal. No pallor or icterus   Skin: Not diaphoretic. No lesions  HENT: Head normocephalic. Nares patent without drainage oropharynx clear without erythema or exudate. Moist mucous membranes. Mallampati is 4. Tongue is scalloped and enlarged. Patient has adequate dentition. Normal palate. No retrognathia, full white beard noted, very thick neck  Cardiovascular: nl s1/s2, RRR no m/r/g   Pulmonary: Respiratory effort is normal without labored breathing or accessory muscle use. No respiratory distress/cough/wheezng  Neurological: alert , Attention, speech and language are normal. There is facial symmetry. Musculoskeletal:  Moves all extremities equally and no focal weakness noted. Gait is unassisted. Psychiatric: pleasant, mood and affect well adjusted. cooperative and behavior is appropriate. Results:            ASSESSMENT AND PLAN  1. STEPHANIE (obstructive sleep apnea)  Assessment & Plan:  Patient is doing very well on stable CPAP therapy CPAP 15 cm H2O, download reviewed in detail no concerns today, supplies given and we will follow-up in 1 year, sooner if needed. Weight loss efforts discussed and should he lose 20 to 30 pounds we can reevaluate for improvement or pressure changes. Orders:  -     DME - DURABLE MEDICAL EQUIPMENT  2. Obesity (BMI 30-39. 9)  Assessment & Plan:   This is stable and unchanged    We did discuss utilizing an oronasal or full facemask which she has tried in the past with leak should he have a cold or nasal congestion, we also discussed his machine is getting to the point where it could be replaced if he is having any issues with it just to call us and we will order it at that time. Right now it appears to be functioning well. Mr. Anaid Bess is  compliant with appropriate usage and getting good clinical response to his STEPHANIE treatment. The change in his AHI was compared to the baseline, and the benefits of treatment were discussed. Usage, with need for compliance to see best benefits, was explained with need to use the device more than 4 hours a night. Usage for more than 6 hours would provide even further benefit and was explained. He is currently at a median use of 8 hours hours. He has been doing well in this regard. Equipment orders will be renewed as appropriate. Instructions on CPAP usage, proper maintenance and device cleaning were included in the after visit summary today. He should not drive if drowsy or if he has had inadequate sleep. I will see him back in 12 months. Time Spent: Time spent total 30 minutes exclusive of procedures, during the 24 hour period of the date of encounter: preparing to see the patient (e.g. reviewing chart notes, tests), performing a medically appropriate examination and/or evaluation, documenting clinical information in the electronic or other health record, counseling and educating the patient/family/caregiver, obtaining and/or reviewing separately obtained history and ordering medications, tests, procedures. CPAP download was reviewed with the patient in detail today. Justin Abdul Is doing well today and getting excellent clinical response from CPAP therapy. I encouraged him to continue utilizing his CPAP machine.      Charlene Merino MD  Sleep Medicine/Internal Medicine/Pediatrics

## 2022-12-27 ENCOUNTER — PATIENT MESSAGE (OUTPATIENT)
Dept: CARDIOLOGY CLINIC | Age: 62
End: 2022-12-27

## 2022-12-27 NOTE — TELEPHONE ENCOUNTER
[place]    XXRWNC:[id number]  Status:Approved  Review Type:Prior Auth  Coverage Start Date:11/09/2022  Coverage End Date:12/09/2023

## 2022-12-27 NOTE — TELEPHONE ENCOUNTER
From: Luis Whitfield  To: Dr. Laverne Martel: 12/27/2022 1:32 PM EST  Subject: Xarelto approval    I received the attached letter from [employer].  Please take care of their request.   Thanks

## 2023-01-03 RX ORDER — FERROUS FUMARATE AND POLYSACCHRIDE IRON VITAMIN MINERAL COMPLEX SUPPLEMENT 191.2; 135.9; 1; 210; 20; 5; 5; 7; 25; 3 MG/1; MG/1; MG/1; MG/1; MG/1; MG/1; MG/1; MG/1; MG/1; UG/1
CAPSULE ORAL
Qty: 90 CAPSULE | Refills: 3 | OUTPATIENT
Start: 2023-01-03

## 2023-01-09 RX ORDER — DULAGLUTIDE 1.5 MG/.5ML
INJECTION, SOLUTION SUBCUTANEOUS
Refills: 1 | OUTPATIENT
Start: 2023-01-09

## 2023-01-11 RX ORDER — AMLODIPINE BESYLATE 5 MG/1
TABLET ORAL
Qty: 90 TABLET | Refills: 3 | Status: SHIPPED | OUTPATIENT
Start: 2023-01-11

## 2023-01-11 RX ORDER — METOPROLOL SUCCINATE 25 MG/1
TABLET, EXTENDED RELEASE ORAL
Qty: 90 TABLET | Refills: 3 | Status: SHIPPED | OUTPATIENT
Start: 2023-01-11

## 2023-01-13 NOTE — PROGRESS NOTES
aDate: 2023      Name: Nomi Barry      MRN: [id number]       : 1960       Age: 60 y.o. Sex: male        Johana Whiting MD       CC:  No chief complaint on file. HPI:     Nomi Barry is a 60 y.o. male who presents for evaluation of gallbladder problems as a referral from Dr. Bill Webber. The patient had ultrasound at [facility] done on 22 which showed an 11 mm gallstone. He had been having RUQ pain prior to the ultrasound. A previous HIDA scan was negative. The patient is on Xarelto for a protein C deficiency. He has had three blood clots in the past. No PE'S    The patient has RUQ pain that radiates to his back. He is having pain now as he was in the office. Nausea, but no vomiting, diarrhea or GERD. The GERD may be masked by the [facility] he takes twice per day.      PMH:    Past Medical History:   Diagnosis Date    Angina pectoris ([address]     Anxiety     ASHD (arteriosclerotic heart disease)     Calcium score = 1056    Bursitis     Cancer (HCC)     basal cell carcinoma    Coronary artery disease     Coronary atherosclerosis due to calcified coronary lesion (CODE) 2016    Diverticulitis     Duodenal ulcer     DVT (deep venous thrombosis) (HCC)     2 episodes    Elevated liver enzymes 3/1/2018    GERD (gastroesophageal reflux disease)     HTN (hypertension)     Hypercholesterolemia     Hypothyroidism     Kidney stones     Myositis     Personal history of DVT (deep vein thrombosis) 2016    Protein C deficiency ([address]     Rhabdomyolysis     [facility]    Sleep apnea     using CPAP    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 ([address]     Varicella        PSH:    Past Surgical History:   Procedure Laterality Date    CARDIAC CATHETERIZATION  2015    COLONOSCOPY  2013    HEENT Right 2020    skin lesion bx of ear    OTHER SURGICAL HISTORY  2022    dental surgery    URETEROLITHOTOMY Right 2013    WISDOM TOOTH EXTRACTION         MEDS:    Prior to Visit Medications Medication Sig Taking? Authorizing Provider   amLODIPine (NORVASC) 5 MG tablet TAKE 1 TABLET DAILY  Rosa Martinez MD   metoprolol succinate (TOPROL XL) 25 MG extended release tablet TAKE 1 TABLET DAILY  Rosa Martinez MD   ezetimibe (ZETIA) 10 MG tablet TAKE 1 TABLET DAILY  Rosa Martinez MD   Evolocumab (REPATHA SURECLICK) 245 MG/ML SOAJ Inject 140 mg into the skin every 14 days  Rosa Martinez MD   rivaroxaban (XARELTO) 20 MG TABS tablet Take 1 tablet by mouth daily  Rosa Martinez MD   losartan (COZAAR) 100 MG tablet Take 1 tablet by mouth daily TAKE 1 TABLET DAILY  Rosa Martinez MD   hydroCHLOROthiazide (HYDRODIURIL) 12.5 MG tablet Take 1 tablet by mouth daily TAKE 1 TABLET DAILY  Rosa Martinez MD   azaTHIOprine (IMURAN) 50 MG tablet Take 3 tablets in the morning and 2 tablets at night for a total dose of 250mg daily.   Rosalba Dos Santos MD   DULoxetine (CYMBALTA) 30 MG extended release capsule Take 1 capsule by mouth daily TAKE 1 CAPSULE DAILY  Rosalba Dos Santos MD   empagliflozin (JARDIANCE) 10 MG tablet Take 1 tablet by mouth daily  Rosalba Dos Santos MD   levothyroxine (SYNTHROID) 200 MCG tablet Take 1 tablet by mouth every morning (before breakfast)  Rosalba Dos Santos MD   metFORMIN (GLUCOPHAGE) 500 MG tablet TAKE 2 TABLETS TWICE A DAY WITH MEALS  Rosalba Dos Santos MD   Dulaglutide (TRULICITY) 3 ZS/2.6ZU SOPN Inject 3 mg into the skin once a week  Rosalba Dos Santos MD   ciclopirox (LOPROX) 0.77 % cream Apply topically 2 times daily  Ar Automatic Reconciliation   fexofenadine (ALLEGRA) 180 MG tablet Take 180 mg by mouth daily  Ar Automatic Reconciliation   fluticasone (CUTIVATE) 0.05 % cream Apply topically daily  Ar Automatic Reconciliation   methylPREDNISolone (MEDROL) 4 MG tablet Take 4 mg by mouth daily (with breakfast)  Ar Automatic Reconciliation   nitroGLYCERIN (NITROSTAT) 0.4 MG SL tablet Place 0.4 mg under the tongue every 5 minutes as needed  Ar Automatic Reconciliation   omeprazole (PRILOSEC) 40 MG delayed release capsule TAKE 1 CAPSULE DAILY  Ar Automatic Reconciliation       ALLERGIES:      Allergies   Allergen Reactions    Statins Myalgia       SH:    Social History     Tobacco Use    Smoking status: Former     Packs/day: 1.00     Years: 15.00     Pack years: 15.00     Types: Cigarettes     Quit date: 2006     Years since quittin.7    Smokeless tobacco: Former     Quit date: 2006   Substance Use Topics    Alcohol use: No    Drug use: No       FH:    Family History   Problem Relation Age of Onset    Kidney Disease Son         secondary to congenital vessel disease    Kidney Disease Paternal Uncle     Diabetes Paternal Uncle     Heart Surgery Paternal Uncle         in 76s    Heart Attack Paternal Uncle     Heart Surgery Paternal Aunt     Alzheimer's Disease Father     Other Mother         \"valve issue\"    Heart Surgery Father         mid to late 62s    Hypertension Brother     Sleep Apnea Brother     Heart Disease Father     Pulmonary Embolism Mother         protien C deficiency       ROS: The patient has no difficulty with chest pain or shortness of breath. No fever or chills. Comprehensive 13 point review of systems was otherwise unremarkable except as noted above. Physical Exam:     There were no vitals taken for this visit. General: Alert, oriented, cooperative white male in no acute distress. Eyes: Sclera are clear. Conjunctiva and lids within normal limits. No icterus. Ears and Nose: no gross deformities to visual inspection, gross hearing intact  Neck: Supple, trachea midline, no appreciable thyromegaly  Resp: Breathing is  non-labored. Lungs clear to auscultation without wheezing or rhonchi   CV: RRR. No murmurs, rubs or gallops appreciated. Abd: soft, obese, mild RUQ and R flank pain, active BS'S  Psych:  Mood and affect appropriate.   Short-term memory and understanding intact      Assessment/Plan:  Meera Bee Alysia Morales is a 60 y.o. male who has signs and symptoms consistent with cholelithiasis/cholecystitis with a protein C deficiency. 1. Hematology evaluation for anticoagulation around surgery due to protein C deficiency. 2. Laparoscopic, possible open, cholecystectomy. I went through the risks of bleeding, infection and anesthesia. I went through other risks of injury to the liver, biliary tree structures, stomach, small bowel, large bowel , pancreas and the potential need to convert to an open procedure.     Mikhail Aguilar MD     [facility]   1/13/2023  3:18 PM

## 2023-01-16 ENCOUNTER — OFFICE VISIT (OUTPATIENT)
Dept: SURGERY | Age: 63
End: 2023-01-16
Payer: COMMERCIAL

## 2023-01-16 VITALS
DIASTOLIC BLOOD PRESSURE: 94 MMHG | HEART RATE: 94 BPM | SYSTOLIC BLOOD PRESSURE: 152 MMHG | BODY MASS INDEX: 38.95 KG/M2 | WEIGHT: 248.7 LBS

## 2023-01-16 DIAGNOSIS — D68.59 PROTEIN C DEFICIENCY (HCC): ICD-10-CM

## 2023-01-16 DIAGNOSIS — K80.10 CALCULUS OF GALLBLADDER WITH CHOLECYSTITIS WITHOUT BILIARY OBSTRUCTION, UNSPECIFIED CHOLECYSTITIS ACUITY: Primary | ICD-10-CM

## 2023-01-16 DIAGNOSIS — Z79.01 CHRONIC ANTICOAGULATION: ICD-10-CM

## 2023-01-16 DIAGNOSIS — E66.01 MORBID OBESITY WITH BMI OF 40.0-44.9, ADULT (HCC): ICD-10-CM

## 2023-01-16 PROCEDURE — 3080F DIAST BP >= 90 MM HG: CPT | Performed by: SURGERY

## 2023-01-16 PROCEDURE — 99203 OFFICE O/P NEW LOW 30 MIN: CPT | Performed by: SURGERY

## 2023-01-16 PROCEDURE — 3077F SYST BP >= 140 MM HG: CPT | Performed by: SURGERY

## 2023-01-17 ENCOUNTER — OFFICE VISIT (OUTPATIENT)
Dept: INTERNAL MEDICINE CLINIC | Facility: CLINIC | Age: 63
End: 2023-01-17
Payer: COMMERCIAL

## 2023-01-17 VITALS
OXYGEN SATURATION: 97 % | SYSTOLIC BLOOD PRESSURE: 135 MMHG | HEIGHT: 67 IN | BODY MASS INDEX: 39.24 KG/M2 | TEMPERATURE: 97.9 F | WEIGHT: 250 LBS | DIASTOLIC BLOOD PRESSURE: 79 MMHG | HEART RATE: 69 BPM

## 2023-01-17 DIAGNOSIS — Z79.01 CHRONIC ANTICOAGULATION: ICD-10-CM

## 2023-01-17 DIAGNOSIS — E78.49 OTHER HYPERLIPIDEMIA: ICD-10-CM

## 2023-01-17 DIAGNOSIS — F34.1 DYSTHYMIC DISORDER: ICD-10-CM

## 2023-01-17 DIAGNOSIS — I25.10 ASHD (ARTERIOSCLEROTIC HEART DISEASE): ICD-10-CM

## 2023-01-17 DIAGNOSIS — E03.9 ACQUIRED HYPOTHYROIDISM: ICD-10-CM

## 2023-01-17 DIAGNOSIS — E11.9 TYPE 2 DIABETES MELLITUS WITHOUT COMPLICATION, WITHOUT LONG-TERM CURRENT USE OF INSULIN (HCC): Primary | ICD-10-CM

## 2023-01-17 DIAGNOSIS — M33.20 POLYMYOSITIS (HCC): ICD-10-CM

## 2023-01-17 PROCEDURE — 3075F SYST BP GE 130 - 139MM HG: CPT | Performed by: INTERNAL MEDICINE

## 2023-01-17 PROCEDURE — 99214 OFFICE O/P EST MOD 30 MIN: CPT | Performed by: INTERNAL MEDICINE

## 2023-01-17 PROCEDURE — 3078F DIAST BP <80 MM HG: CPT | Performed by: INTERNAL MEDICINE

## 2023-01-17 RX ORDER — DULOXETIN HYDROCHLORIDE 30 MG/1
CAPSULE, DELAYED RELEASE ORAL
Qty: 90 CAPSULE | Refills: 1 | Status: SHIPPED | OUTPATIENT
Start: 2023-01-17

## 2023-01-17 RX ORDER — METHYLPREDNISOLONE 4 MG/1
4 TABLET ORAL
Qty: 90 TABLET | Refills: 1 | Status: SHIPPED | OUTPATIENT
Start: 2023-01-17

## 2023-01-17 RX ORDER — LEVOTHYROXINE SODIUM 0.2 MG/1
200 TABLET ORAL
Qty: 90 TABLET | Refills: 1 | Status: SHIPPED | OUTPATIENT
Start: 2023-01-17

## 2023-01-17 RX ORDER — DULAGLUTIDE 3 MG/.5ML
3 INJECTION, SOLUTION SUBCUTANEOUS WEEKLY
Qty: 12 ADJUSTABLE DOSE PRE-FILLED PEN SYRINGE | Refills: 1 | Status: SHIPPED | OUTPATIENT
Start: 2023-01-17

## 2023-01-17 RX ORDER — AZATHIOPRINE 50 MG/1
TABLET ORAL
Qty: 450 TABLET | Refills: 1 | Status: SHIPPED | OUTPATIENT
Start: 2023-01-17

## 2023-01-17 ASSESSMENT — ANXIETY QUESTIONNAIRES
3. WORRYING TOO MUCH ABOUT DIFFERENT THINGS: 0
GAD7 TOTAL SCORE: 0
5. BEING SO RESTLESS THAT IT IS HARD TO SIT STILL: 0
1. FEELING NERVOUS, ANXIOUS, OR ON EDGE: 0
IF YOU CHECKED OFF ANY PROBLEMS ON THIS QUESTIONNAIRE, HOW DIFFICULT HAVE THESE PROBLEMS MADE IT FOR YOU TO DO YOUR WORK, TAKE CARE OF THINGS AT HOME, OR GET ALONG WITH OTHER PEOPLE: NOT DIFFICULT AT ALL
6. BECOMING EASILY ANNOYED OR IRRITABLE: 0
7. FEELING AFRAID AS IF SOMETHING AWFUL MIGHT HAPPEN: 0
2. NOT BEING ABLE TO STOP OR CONTROL WORRYING: 0
4. TROUBLE RELAXING: 0

## 2023-01-17 ASSESSMENT — PATIENT HEALTH QUESTIONNAIRE - PHQ9
2. FEELING DOWN, DEPRESSED OR HOPELESS: 0
3. TROUBLE FALLING OR STAYING ASLEEP: 0
SUM OF ALL RESPONSES TO PHQ QUESTIONS 1-9: 0
1. LITTLE INTEREST OR PLEASURE IN DOING THINGS: 0
10. IF YOU CHECKED OFF ANY PROBLEMS, HOW DIFFICULT HAVE THESE PROBLEMS MADE IT FOR YOU TO DO YOUR WORK, TAKE CARE OF THINGS AT HOME, OR GET ALONG WITH OTHER PEOPLE: 0
4. FEELING TIRED OR HAVING LITTLE ENERGY: 0
SUM OF ALL RESPONSES TO PHQ9 QUESTIONS 1 & 2: 0
SUM OF ALL RESPONSES TO PHQ QUESTIONS 1-9: 0
SUM OF ALL RESPONSES TO PHQ QUESTIONS 1-9: 0
9. THOUGHTS THAT YOU WOULD BE BETTER OFF DEAD, OR OF HURTING YOURSELF: 0
6. FEELING BAD ABOUT YOURSELF - OR THAT YOU ARE A FAILURE OR HAVE LET YOURSELF OR YOUR FAMILY DOWN: 0
5. POOR APPETITE OR OVEREATING: 0
8. MOVING OR SPEAKING SO SLOWLY THAT OTHER PEOPLE COULD HAVE NOTICED. OR THE OPPOSITE, BEING SO FIGETY OR RESTLESS THAT YOU HAVE BEEN MOVING AROUND A LOT MORE THAN USUAL: 0
SUM OF ALL RESPONSES TO PHQ QUESTIONS 1-9: 0
7. TROUBLE CONCENTRATING ON THINGS, SUCH AS READING THE NEWSPAPER OR WATCHING TELEVISION: 0

## 2023-01-17 ASSESSMENT — ENCOUNTER SYMPTOMS
ABDOMINAL PAIN: 1
SHORTNESS OF BREATH: 0

## 2023-01-17 NOTE — PROGRESS NOTES
[facility]  Dylon Gallagher M.D.  Internal Medicine  [address] [place] [id number]  Office : [phone]  Fax : [phone]    Chief Complaint   Patient presents with    Diabetes     4 mo follow up         History of Present Illness:  Tejinder De La Fuente is a 62 y.o. male.  HPI    Diabetes Mellitus  Patient presents  for follow up on diabetes.  Onset of symptoms was several years ago. Patient describes symptoms as none. Course to date has been well controlled.Diet and Lifestyle: follows a diabetic diet regularly  exercises sporadically  nonsmoker  Home glucose monitoring:  Results average . Patient denies polyuria and polydipsia. No wounds in lower limbs.  Most recent HbA1c was   Hemoglobin A1C   Date Value Ref Range Status   09/16/2022 6.3 (H) 4.8 - 5.6 % Final       Hypertension  Patient is in for Hypertension which is stable.    Diet and Lifestyle: generally follows a low sodium diet  Home BP Monitoring: is not measured at home. Patient's recent blood pressures were   BP Readings from Last 3 Encounters:   01/17/23 135/79   01/16/23 (!) 152/94   12/19/22 130/60   .   taking medications as instructed, no medication side effects noted, no TIA's, no chest pain on exertion, no dyspnea on exertion, no swelling of ankles. Cardiac risk factors consist of diabetes mellitus, dyslipidemia, hypertension, and male gender.    Hyperlipidemia  Patient is in for follow-up for hyperlipidemia.  Diet and Lifestyle: nonsmoker. Risk factors for vascular disease consist of hyperlipidemia, hypertension, and diabetes.  LAST LDL was   Lab Results   Component Value Date    LDLCALC 30.8 09/16/2022   . Last ALT was   Lab Results   Component Value Date/Time    ALT 60 09/16/2022 10:31 AM   .  No muscle aches.    Hypothyroidism  He presents for follow up of  Hypothyroidism and reports  no new problems. . He reports his symptoms are  stable.   The patient currently is taking thyroid  replacement.   Lab Results   Component Value Date    TSH 1.230 02/25/2022   .      Chronic anticoagulation for recurrent DVT  Stable on chronic Xarelto    ASHD  Patient denies any angina.  On  Repatha and zetia    Polymyositis  His strength is back to his historical baseline.   Remains on Immuran and low dose medrol.   Remains on medrol 4 mg daily  No results found for: SEDRATE  Lab Results   Component Value Date    CKTOTAL 118 09/16/2022         Past Medical History:  Past Medical History:   Diagnosis Date    Angina pectoris (HCC)     Anxiety     ASHD (arteriosclerotic heart disease) 2014    Calcium score = 1056    Bursitis     Cancer (HCC)     basal cell carcinoma    Coronary artery disease     Coronary atherosclerosis due to calcified coronary lesion (CODE) 5/23/2016    Diverticulitis     Duodenal ulcer     DVT (deep venous thrombosis) (HCC)     2 episodes    Elevated liver enzymes 3/1/2018    GERD (gastroesophageal reflux disease)     HTN (hypertension)     Hypercholesterolemia     Hypothyroidism     Kidney stones     Myositis     Personal history of DVT (deep vein thrombosis) 11/18/2016    Protein C deficiency (HCC)     Rhabdomyolysis 2018    [facility]    Sleep apnea     using CPAP    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 (HCC)     Varicella      Past Surgical History:  Past Surgical History:   Procedure Laterality Date    CARDIAC CATHETERIZATION  2015    COLONOSCOPY  2013    HEENT Right 11/2020    skin lesion bx of ear    OTHER SURGICAL HISTORY  02/2022    dental surgery    URETEROLITHOTOMY Right 2013    WISDOM TOOTH EXTRACTION  1980's     Allergies:   Allergies   Allergen Reactions    Statins Myalgia     Medications:   Current Outpatient Medications   Medication Sig Dispense Refill    azaTHIOprine (IMURAN) 50 MG tablet Take 3 tablets in the morning and 2 tablets at night for a total dose of 250mg daily. 450 tablet 1    Dulaglutide (TRULICITY) 3 MG/0.5ML SOPN Inject 3 mg into the skin once a week 12 Adjustable Dose  Pre-filled Pen Syringe 1    DULoxetine (CYMBALTA) 30 MG extended release capsule TAKE 1 CAPSULE DAILY 90 capsule 1    empagliflozin (JARDIANCE) 10 MG tablet Take 1 tablet by mouth daily 90 tablet 1    metFORMIN (GLUCOPHAGE) 500 MG tablet TAKE 2 TABLETS TWICE A DAY WITH MEALS 360 tablet 1    levothyroxine (SYNTHROID) 200 MCG tablet Take 1 tablet by mouth every morning (before breakfast) 90 tablet 1    methylPREDNISolone (MEDROL) 4 MG tablet Take 1 tablet by mouth daily (with breakfast) 90 tablet 1    amLODIPine (NORVASC) 5 MG tablet TAKE 1 TABLET DAILY 90 tablet 3    metoprolol succinate (TOPROL XL) 25 MG extended release tablet TAKE 1 TABLET DAILY 90 tablet 3    ezetimibe (ZETIA) 10 MG tablet TAKE 1 TABLET DAILY 90 tablet 3    Evolocumab (REPATHA SURECLICK) 222 MG/ML SOAJ Inject 140 mg into the skin every 14 days 6 Adjustable Dose Pre-filled Pen Syringe 3    rivaroxaban (XARELTO) 20 MG TABS tablet Take 1 tablet by mouth daily 90 tablet 3    losartan (COZAAR) 100 MG tablet Take 1 tablet by mouth daily TAKE 1 TABLET DAILY 90 tablet 3    hydroCHLOROthiazide (HYDRODIURIL) 12.5 MG tablet Take 1 tablet by mouth daily TAKE 1 TABLET DAILY 90 tablet 3    ciclopirox (LOPROX) 0.77 % cream Apply topically 2 times daily      fexofenadine (ALLEGRA) 180 MG tablet Take 180 mg by mouth daily      fluticasone (CUTIVATE) 0.05 % cream Apply topically daily      nitroGLYCERIN (NITROSTAT) 0.4 MG SL tablet Place 0.4 mg under the tongue every 5 minutes as needed      omeprazole (PRILOSEC) 40 MG delayed release capsule TAKE 1 CAPSULE DAILY       No current facility-administered medications for this visit.      Social History:  Social History     Tobacco Use    Smoking status: Former     Packs/day: 1.00     Years: 15.00     Pack years: 15.00     Types: Cigarettes     Quit date: 2006     Years since quittin.7    Smokeless tobacco: Former     Quit date: 2006   Substance Use Topics    Alcohol use: No     Family History  Family History   Problem Relation Age of Onset    Kidney Disease Son         secondary to congenital vessel disease    Kidney Disease Paternal Uncle     Diabetes Paternal Uncle     Heart Surgery Paternal Uncle         in 76s    Heart Attack Paternal Uncle     Heart Surgery Paternal [de-identified]     Alzheimer's Disease Father     Other Mother         \"valve issue\"    Heart Surgery Father         mid to late 62s    Hypertension Brother     Sleep Apnea Brother     Heart Disease Father     Pulmonary Embolism Mother         protien C deficiency       Review of Systems   Constitutional:  Negative for chills, fatigue and fever. Respiratory:  Negative for shortness of breath. Cardiovascular:  Negative for chest pain. Gastrointestinal:  Positive for abdominal pain. Musculoskeletal:  Negative for gait problem. Skin:  Negative for rash. Neurological:  Negative for speech difficulty. Psychiatric/Behavioral:  Negative for dysphoric mood. Vital Signs  /79 (Site: Left Upper Arm, Position: Sitting, Cuff Size: Large Adult)   Pulse 69   Temp 97.9 °F (36.6 °C) (Temporal)   Ht 5' 7\" (1.702 m)   Wt 250 lb (113.4 kg)   SpO2 97%   BMI 39.16 kg/m²   Body mass index is 39.16 kg/m². Physical Exam  Vitals reviewed. Constitutional:       General: He is not in acute distress. Appearance: Normal appearance. He is not ill-appearing. HENT:      Head: Normocephalic and atraumatic. Eyes:      General: No scleral icterus. Conjunctiva/sclera: Conjunctivae normal.   Neck:      Vascular: No carotid bruit. Cardiovascular:      Rate and Rhythm: Normal rate and regular rhythm. Heart sounds: Normal heart sounds. No murmur heard. Pulmonary:      Effort: Pulmonary effort is normal.      Breath sounds: Normal breath sounds. Musculoskeletal:         General: No swelling. Skin:     Coloration: Skin is not jaundiced. Findings: No rash. Neurological:      General: No focal deficit present.       Mental Status: He is alert. Mental status is at baseline. Cranial Nerves: No cranial nerve deficit. Motor: No weakness. Gait: Gait normal.   Psychiatric:         Mood and Affect: Mood normal.         Behavior: Behavior normal.         Thought Content: Thought content normal.         Judgment: Judgment normal.         Assessment/Plan:  Dora Talley was seen today for diabetes. Diagnoses and all orders for this visit:    Type 2 diabetes mellitus without complication, without long-term current use of insulin (HCC)  -     Dulaglutide (TRULICITY) 3 XX/8.0KL SOPN; Inject 3 mg into the skin once a week  -     empagliflozin (JARDIANCE) 10 MG tablet; Take 1 tablet by mouth daily  -     metFORMIN (GLUCOPHAGE) 500 MG tablet; TAKE 2 TABLETS TWICE A DAY WITH MEALS    Polymyositis (HCC)  -     azaTHIOprine (IMURAN) 50 MG tablet; Take 3 tablets in the morning and 2 tablets at night for a total dose of 250mg daily. -     methylPREDNISolone (MEDROL) 4 MG tablet; Take 1 tablet by mouth daily (with breakfast)    ASHD (arteriosclerotic heart disease)    Chronic anticoagulation    Other hyperlipidemia    Acquired hypothyroidism  -     levothyroxine (SYNTHROID) 200 MCG tablet; Take 1 tablet by mouth every morning (before breakfast)    Dysthymic disorder  -     DULoxetine (CYMBALTA) 30 MG extended release capsule; TAKE 1 CAPSULE DAILY      Return in about 4 months (around 5/17/2023), or if symptoms worsen or fail to improve.   __  Keena Simon M.D.

## 2023-01-18 DIAGNOSIS — Z79.01 CHRONIC ANTICOAGULATION: ICD-10-CM

## 2023-01-18 DIAGNOSIS — D68.59 PROTEIN C DEFICIENCY (HCC): Primary | ICD-10-CM

## 2023-01-18 DIAGNOSIS — K80.10 CALCULUS OF GALLBLADDER WITH CHOLECYSTITIS WITHOUT BILIARY OBSTRUCTION, UNSPECIFIED CHOLECYSTITIS ACUITY: ICD-10-CM

## 2023-01-18 DIAGNOSIS — E66.01 MORBID OBESITY WITH BMI OF 40.0-44.9, ADULT (HCC): ICD-10-CM

## 2023-01-27 ENCOUNTER — TELEPHONE (OUTPATIENT)
Dept: SURGERY | Age: 63
End: 2023-01-27

## 2023-01-27 NOTE — TELEPHONE ENCOUNTER
L/V/M; to discuss scheduling patient for a Laparoscopic, Possible Open, Cholecystectomy w/ Dr. Neo Hooper.  Patient was referred to Hematology; needs pre-op anticoagulation prior to [facility].

## 2023-01-31 ENCOUNTER — TELEPHONE (OUTPATIENT)
Dept: SURGERY | Age: 63
End: 2023-01-31

## 2023-01-31 NOTE — TELEPHONE ENCOUNTER
Spoke w/ patient regarding follow up regarding Hematology referral status. Patient stated that he is scheduled for a consultation w/ Dr. Tristen Perez on 2/16/23. Also, patient confirmed that he does take Xarelto and sees Dr. Gertrude Choudhury at [facility].

## 2023-02-13 DIAGNOSIS — Z79.01 CHRONIC ANTICOAGULATION: Primary | ICD-10-CM

## 2023-02-15 NOTE — PROGRESS NOTES
[facility]      Reason for Referral:  Protein C deficiency    Referring Provider:  Dr. Joanna Mitchell, [facility]     Primary Care Provider:  Dr. Kate Vázquez, [facility]     Family History of Cancer/Hematologic Disorders:  Protein C deficiency     Presenting Symptoms:   Hematology evaluation for anticoagulation due to protein C deficiency, on xarelto. History of 3 blood clots, but no PE. Narrative with recent with Results/Procedures/Biopsies and Dates completed:  Mr. Sterling Harris is a 28-year-old  male with a medical history of HTN, GERD, anxiety, ASHD, CAD, diverticulitis, hypercholesterolemia, kidney stones, sleep apnea, polymyositis, and steroid induced diabetes. Surgical history includes heart cath (2015), ureterolithotomy right (2013), and oral surgery. Family history PE, Alzheimer's disease, heart disease, kidney disease, diabetes, and MI. He is a former smoker, quit in 2006, and denies drug or alcohol use. On 1/26/23 he was seen by Dr. Yarely Sosa for surgical evaluation for cholecystectomy. He has been on long term use of Xarelto for history of blood clots. Referral to hematology for recommendation for anticoagulation in anticipation of upcoming surgery. Notes from Referring Provider:  1/26/23 progress note Dr. Yarely Sosa  Assessment/Plan:  Madeleine Sinclair is a 60 y.o. male who has signs and symptoms consistent with cholelithiasis/cholecystitis with a protein C deficiency. 1. Hematology evaluation for anticoagulation around surgery due to protein C deficiency. 2. Laparoscopic, possible open, cholecystectomy. Other Pertinent Information:  Covid vaccination - 9/3/21 and 9/24/21    Presented at Tumor Board:   No

## 2023-02-16 ENCOUNTER — OFFICE VISIT (OUTPATIENT)
Dept: ONCOLOGY | Age: 63
End: 2023-02-16
Payer: COMMERCIAL

## 2023-02-16 ENCOUNTER — HOSPITAL ENCOUNTER (OUTPATIENT)
Dept: LAB | Age: 63
Discharge: HOME | End: 2023-02-16
Payer: COMMERCIAL

## 2023-02-16 VITALS
TEMPERATURE: 98.8 F | HEART RATE: 79 BPM | SYSTOLIC BLOOD PRESSURE: 127 MMHG | RESPIRATION RATE: 16 BRPM | BODY MASS INDEX: 39.16 KG/M2 | OXYGEN SATURATION: 98 % | WEIGHT: 249.5 LBS | DIASTOLIC BLOOD PRESSURE: 82 MMHG | HEIGHT: 67 IN

## 2023-02-16 DIAGNOSIS — K81.9 CHOLECYSTITIS: ICD-10-CM

## 2023-02-16 DIAGNOSIS — D68.59 THROMBOPHILIA (HCC): Primary | ICD-10-CM

## 2023-02-16 DIAGNOSIS — D68.59 PROTEIN C DEFICIENCY (HCC): ICD-10-CM

## 2023-02-16 DIAGNOSIS — Z79.01 CHRONIC ANTICOAGULATION: ICD-10-CM

## 2023-02-16 LAB
ALBUMIN SERPL-MCNC: 4.2 G/DL (ref 3.2–4.6)
ALBUMIN/GLOB SERPL: 1.1 (ref 0.4–1.6)
ALP SERPL-CCNC: 55 U/L (ref 50–136)
ALT SERPL-CCNC: 70 U/L (ref 12–65)
ANION GAP SERPL CALC-SCNC: 4 MMOL/L (ref 2–11)
AST SERPL-CCNC: 27 U/L (ref 15–37)
BASOPHILS # BLD: 0.1 K/UL (ref 0–0.2)
BASOPHILS NFR BLD: 1 % (ref 0–2)
BILIRUB SERPL-MCNC: 0.3 MG/DL (ref 0.2–1.1)
BUN SERPL-MCNC: 16 MG/DL (ref 8–23)
CALCIUM SERPL-MCNC: 9.5 MG/DL (ref 8.3–10.4)
CHLORIDE SERPL-SCNC: 106 MMOL/L (ref 101–110)
CO2 SERPL-SCNC: 29 MMOL/L (ref 21–32)
CREAT SERPL-MCNC: 1.1 MG/DL (ref 0.8–1.5)
CRP SERPL-MCNC: <0.3 MG/DL (ref 0–0.9)
DIFFERENTIAL METHOD BLD: ABNORMAL
EOSINOPHIL # BLD: 0.2 K/UL (ref 0–0.8)
EOSINOPHIL NFR BLD: 2 % (ref 0.5–7.8)
ERYTHROCYTE [DISTWIDTH] IN BLOOD BY AUTOMATED COUNT: 14.6 % (ref 11.9–14.6)
GLOBULIN SER CALC-MCNC: 4 G/DL (ref 2.8–4.5)
GLUCOSE SERPL-MCNC: 101 MG/DL (ref 65–100)
HCT VFR BLD AUTO: 49 % (ref 41.1–50.3)
HGB BLD-MCNC: 15.9 G/DL (ref 13.6–17.2)
IMM GRANULOCYTES # BLD AUTO: 0.1 K/UL (ref 0–0.5)
IMM GRANULOCYTES NFR BLD AUTO: 1 % (ref 0–5)
LYMPHOCYTES # BLD: 0.6 K/UL (ref 0.5–4.6)
LYMPHOCYTES NFR BLD: 8 % (ref 13–44)
MCH RBC QN AUTO: 32.7 PG (ref 26.1–32.9)
MCHC RBC AUTO-ENTMCNC: 32.4 G/DL (ref 31.4–35)
MCV RBC AUTO: 100.8 FL (ref 82–102)
MONOCYTES # BLD: 0.7 K/UL (ref 0.1–1.3)
MONOCYTES NFR BLD: 9 % (ref 4–12)
NEUTS SEG # BLD: 6 K/UL (ref 1.7–8.2)
NEUTS SEG NFR BLD: 79 % (ref 43–78)
NRBC # BLD: 0 K/UL (ref 0–0.2)
PLATELET # BLD AUTO: 246 K/UL (ref 150–450)
PMV BLD AUTO: 8.9 FL (ref 9.4–12.3)
POTASSIUM SERPL-SCNC: 4.2 MMOL/L (ref 3.5–5.1)
PROT SERPL-MCNC: 8.2 G/DL (ref 6.3–8.2)
RBC # BLD AUTO: 4.86 M/UL (ref 4.23–5.6)
SODIUM SERPL-SCNC: 139 MMOL/L (ref 133–143)
WBC # BLD AUTO: 7.5 K/UL (ref 4.3–11.1)

## 2023-02-16 PROCEDURE — 85025 COMPLETE CBC W/AUTO DIFF WBC: CPT

## 2023-02-16 PROCEDURE — 80053 COMPREHEN METABOLIC PANEL: CPT

## 2023-02-16 PROCEDURE — 36415 COLL VENOUS BLD VENIPUNCTURE: CPT

## 2023-02-16 PROCEDURE — 99204 OFFICE O/P NEW MOD 45 MIN: CPT | Performed by: INTERNAL MEDICINE

## 2023-02-16 PROCEDURE — 86140 C-REACTIVE PROTEIN: CPT

## 2023-02-16 PROCEDURE — 3079F DIAST BP 80-89 MM HG: CPT | Performed by: INTERNAL MEDICINE

## 2023-02-16 PROCEDURE — 3074F SYST BP LT 130 MM HG: CPT | Performed by: INTERNAL MEDICINE

## 2023-02-16 RX ORDER — ASPIRIN 81 MG/1
81 TABLET ORAL
COMMUNITY
Start: 2011-05-02

## 2023-02-16 ASSESSMENT — PATIENT HEALTH QUESTIONNAIRE - PHQ9
SUM OF ALL RESPONSES TO PHQ QUESTIONS 1-9: 0
SUM OF ALL RESPONSES TO PHQ QUESTIONS 1-9: 0
1. LITTLE INTEREST OR PLEASURE IN DOING THINGS: 0
SUM OF ALL RESPONSES TO PHQ QUESTIONS 1-9: 0
SUM OF ALL RESPONSES TO PHQ QUESTIONS 1-9: 0
2. FEELING DOWN, DEPRESSED OR HOPELESS: 0
SUM OF ALL RESPONSES TO PHQ9 QUESTIONS 1 & 2: 0

## 2023-02-16 NOTE — PATIENT INSTRUCTIONS
Patient Instructions from Today's Visit    Reason for Visit:  New patient protein C deficiency     Plan:  Stop Xarelto 2 days before surgery and restart day after surgery  Stop baby Aspirin 5-7 days before surgery    Follow Up:  As needed    Recent Lab Results:  Hospital Outpatient Visit on 02/16/2023   Component Date Value Ref Range Status    WBC 02/16/2023 7.5  4.3 - 11.1 K/uL Final    RBC 02/16/2023 4.86  4.23 - 5.6 M/uL Final    Hemoglobin 02/16/2023 15.9  13.6 - 17.2 g/dL Final    Hematocrit 02/16/2023 49.0  41.1 - 50.3 % Final    MCV 02/16/2023 100.8  82.0 - 102.0 FL Final    MCH 02/16/2023 32.7  26.1 - 32.9 PG Final    MCHC 02/16/2023 32.4  31.4 - 35.0 g/dL Final    RDW 02/16/2023 14.6  11.9 - 14.6 % Final    Platelets 67/01/6674 246  150 - 450 K/uL Final    MPV 02/16/2023 8.9 (A)  9.4 - 12.3 FL Final    nRBC 02/16/2023 0.00  0.0 - 0.2 K/uL Final    **Note: Absolute NRBC parameter is now reported with Hemogram**    Differential Type 02/16/2023 AUTOMATED    Final    Seg Neutrophils 02/16/2023 79 (A)  43 - 78 % Final    Lymphocytes 02/16/2023 8 (A)  13 - 44 % Final    Monocytes 02/16/2023 9  4.0 - 12.0 % Final    Eosinophils % 02/16/2023 2  0.5 - 7.8 % Final    Basophils 02/16/2023 1  0.0 - 2.0 % Final    Immature Granulocytes 02/16/2023 1  0.0 - 5.0 % Final    Segs Absolute 02/16/2023 6.0  1.7 - 8.2 K/UL Final    Absolute Lymph # 02/16/2023 0.6  0.5 - 4.6 K/UL Final    Absolute Mono # 02/16/2023 0.7  0.1 - 1.3 K/UL Final    Absolute Eos # 02/16/2023 0.2  0.0 - 0.8 K/UL Final    Basophils Absolute 02/16/2023 0.1  0.0 - 0.2 K/UL Final    Absolute Immature Granulocyte 02/16/2023 0.1  0.0 - 0.5 K/UL Final    Sodium 02/16/2023 139  133 - 143 mmol/L Final    Potassium 02/16/2023 4.2  3.5 - 5.1 mmol/L Final    Chloride 02/16/2023 106  101 - 110 mmol/L Final    CO2 02/16/2023 29  21 - 32 mmol/L Final    Anion Gap 02/16/2023 4  2 - 11 mmol/L Final    Glucose 02/16/2023 101 (A)  65 - 100 mg/dL Final    BUN 02/16/2023 16  8 - 23 MG/DL Final    Creatinine 02/16/2023 1.10  0.8 - 1.5 MG/DL Final    Est, Afia Filt Rate 02/16/2023 >60  >60 ml/min/1.73m2 Final    Comment:      Pediatric calculator link: Lyndsey.at. org/professionals/kdoqi/gfr_calculatorped       These results are not intended for use in patients <25years of age. eGFR results are calculated without a race factor using  the 2021 CKD-EPI equation. Careful clinical correlation is recommended, particularly when comparing to results calculated using previous equations. The CKD-EPI equation is less accurate in patients with extremes of muscle mass, extra-renal metabolism of creatinine, excessive creatine ingestion, or following therapy that affects renal tubular secretion. Calcium 02/16/2023 9.5  8.3 - 10.4 MG/DL Final    Total Bilirubin 02/16/2023 0.3  0.2 - 1.1 MG/DL Final    ALT 02/16/2023 70 (A)  12 - 65 U/L Final    AST 02/16/2023 27  15 - 37 U/L Final    Alk Phosphatase 02/16/2023 55  50 - 136 U/L Final    Total Protein 02/16/2023 8.2  6.3 - 8.2 g/dL Final    Albumin 02/16/2023 4.2  3.2 - 4.6 g/dL Final    Globulin 02/16/2023 4.0  2.8 - 4.5 g/dL Final    Albumin/Globulin Ratio 02/16/2023 1.1  0.4 - 1.6   Final    CRP 02/16/2023 <0.3  0.0 - 0.9 mg/dL Final         Treatment Summary has been discussed and given to patient: n/a        -------------------------------------------------------------------------------------------------------------------  Please call our office at [phone] if you have any  of the following symptoms:   Fever of 100.5 or greater  Chills  Shortness of breath  Swelling or pain in one leg    After office hours an answering service is available and will contact a provider for emergencies or if you are experiencing any of the above symptoms. Patient does express an interest in My Chart. My Chart log in information explained on the after visit summary printout at the [facility] desk.     Colin Carter RN

## 2023-02-16 NOTE — PROGRESS NOTES
[address] [facility] Hematology & Oncology: Office Visit New Patient H/P    Chief Complaint:    Protein C deficiency    History of Present Illness:  Reason for Referral:  Protein C deficiency     Referring Provider:  Dr. Leonardo Cote, [address] [place] [address]      Primary Care Provider:  Dr. Jamia Lee, [facility]      Family History of Cancer/Hematologic Disorders:  Protein C deficiency      Presenting Symptoms:   Hematology evaluation for anticoagulation due to protein C deficiency, on xarelto. History of 3 blood clots, but no PE. Mr. Kristie Arias is a 60 y.o.  male with a medical history of HTN, GERD, anxiety, ASHD, CAD, diverticulitis, hypercholesterolemia, kidney stones, sleep apnea, polymyositis, and steroid induced diabetes. Surgical history includes heart cath (2015), ureterolithotomy right (2013), and oral surgery. Family history PE, Alzheimer's disease, heart disease, kidney disease, diabetes, and MI. He is a former smoker, quit in 2006, and denies drug or alcohol use. On 1/26/23 he was seen by Dr. Boogie High for surgical evaluation for cholecystectomy. He has been on long term use of Xarelto for history of blood clots. Referral to hematology for recommendation for anticoagulation in anticipation of upcoming surgery. Notes from Referring Provider:  1/26/23 progress note Dr. Boogie High  Assessment/Plan:  Tai Post is a 60 y.o. male who has signs and symptoms consistent with cholelithiasis/cholecystitis with a protein C deficiency. 1. Hematology evaluation for anticoagulation around surgery due to protein C deficiency. 2. Laparoscopic, possible open, cholecystectomy. Other Pertinent Information:  Covid vaccination - 9/3/21 and 9/24/21       Review of Systems:  Constitutional Denies fever or chills. Denies weight loss or appetite changes. Denies fatigue. Denies anorexia.    HEENT Denies trauma, bluring vision, hearing loss, ear pain, nosebleeds, sore throat, neck pain and ear discharge. Skin Denies lesions or rashes. Lungs Denies shortness of breath, cough, sputum production or hemoptysis. Cardiovascular Denies chest pain, palpitations, orthopnea, claudication and leg swelling. Gastrointestinal Denies nausea, vomiting, bowel changes. Denies bloody or black stools. Denies abdominal pain.  Denies dysuria, frequency or hesitancy of urination   Neuro Denies headaches, visual changes or ataxia. Denies dizziness, tingling, tremors, sensory change, speech change, focal weakness and headaches. Hematology Denies nasal/gum bleeding, denies easy bruise   Endo Denies heat/cold intolerance, denies diabetes. MSK Denies back pain, swollen legs, myalgias and falls. Psychiatric/Behavioral Denies depression and substance abuse. The patient is not nervous/anxious.        Allergies   Allergen Reactions    Statins Myalgia     Past Medical History:   Diagnosis Date    Angina pectoris ([address]     Anxiety     ASHD (arteriosclerotic heart disease) 2014    Calcium score = 1056    Bursitis     Cancer (HCC)     basal cell carcinoma    Coronary artery disease     Coronary atherosclerosis due to calcified coronary lesion (CODE) 5/23/2016    Diverticulitis     Duodenal ulcer     DVT (deep venous thrombosis) (HCC)     2 episodes    Elevated liver enzymes 3/1/2018    GERD (gastroesophageal reflux disease)     HTN (hypertension)     Hypercholesterolemia     Hypothyroidism     Kidney stones     Myositis     Personal history of DVT (deep vein thrombosis) 11/18/2016    Protein C deficiency ([address]     Rhabdomyolysis 2018    [facility]    Sleep apnea     using CPAP    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 ([address]     Varicella      Past Surgical History:   Procedure Laterality Date    CARDIAC CATHETERIZATION  2015    COLONOSCOPY  2013    HEENT Right 11/2020    skin lesion bx of ear    OTHER SURGICAL HISTORY  02/2022    dental surgery    URETEROLITHOTOMY Right 2013 WISDOM TOOTH EXTRACTION  18's     Family History   Problem Relation Age of Onset    Kidney Disease Son         secondary to congenital vessel disease    Kidney Disease Paternal Uncle     Diabetes Paternal Uncle     Heart Surgery Paternal Uncle         in 76s    Heart Attack Paternal Uncle     Heart Surgery Paternal Aunt     Alzheimer's Disease Father     Other Mother         \"valve issue\"    Heart Surgery Father         mid to late 62s    Hypertension Brother     Sleep Apnea Brother     Heart Disease Father     Pulmonary Embolism Mother         protien C deficiency     Social History     Socioeconomic History    Marital status:      Spouse name: Not on file    Number of children: Not on file    Years of education: Not on file    Highest education level: Not on file   Occupational History    Not on file   Tobacco Use    Smoking status: Former     Packs/day: 1.00     Years: 15.00     Pack years: 15.00     Types: Cigarettes     Quit date: 2006     Years since quittin.8    Smokeless tobacco: Former     Quit date: 2006   Substance and Sexual Activity    Alcohol use: No    Drug use: No    Sexual activity: Not on file   Other Topics Concern    Not on file   Social History Narrative    No pets. Patient currently drinks 2 caffeine drinks per day.      Social Determinants of Health     Financial Resource Strain: Low Risk     Difficulty of Paying Living Expenses: Not hard at all   Food Insecurity: No Food Insecurity    Worried About Running Out of Food in the Last Year: Never true    Ran Out of Food in the Last Year: Never true   Transportation Needs: Not on file   Physical Activity: Not on file   Stress: Not on file   Social Connections: Not on file   Intimate Partner Violence: Not on file   Housing Stability: Not on file     Current Outpatient Medications   Medication Sig Dispense Refill    aspirin 81 MG EC tablet Take 81 mg by mouth      azaTHIOprine (IMURAN) 50 MG tablet Take 3 tablets in the morning and 2 tablets at night for a total dose of 250mg daily. 450 tablet 1    Dulaglutide (TRULICITY) 3 QV/5.8FU SOPN Inject 3 mg into the skin once a week 12 Adjustable Dose Pre-filled Pen Syringe 1    DULoxetine (CYMBALTA) 30 MG extended release capsule TAKE 1 CAPSULE DAILY 90 capsule 1    empagliflozin (JARDIANCE) 10 MG tablet Take 1 tablet by mouth daily 90 tablet 1    metFORMIN (GLUCOPHAGE) 500 MG tablet TAKE 2 TABLETS TWICE A DAY WITH MEALS 360 tablet 1    levothyroxine (SYNTHROID) 200 MCG tablet Take 1 tablet by mouth every morning (before breakfast) 90 tablet 1    methylPREDNISolone (MEDROL) 4 MG tablet Take 1 tablet by mouth daily (with breakfast) 90 tablet 1    amLODIPine (NORVASC) 5 MG tablet TAKE 1 TABLET DAILY 90 tablet 3    metoprolol succinate (TOPROL XL) 25 MG extended release tablet TAKE 1 TABLET DAILY 90 tablet 3    ezetimibe (ZETIA) 10 MG tablet TAKE 1 TABLET DAILY 90 tablet 3    Evolocumab (REPATHA SURECLICK) 098 MG/ML SOAJ Inject 140 mg into the skin every 14 days 6 Adjustable Dose Pre-filled Pen Syringe 3    rivaroxaban (XARELTO) 20 MG TABS tablet Take 1 tablet by mouth daily 90 tablet 3    losartan (COZAAR) 100 MG tablet Take 1 tablet by mouth daily TAKE 1 TABLET DAILY 90 tablet 3    hydroCHLOROthiazide (HYDRODIURIL) 12.5 MG tablet Take 1 tablet by mouth daily TAKE 1 TABLET DAILY 90 tablet 3    ciclopirox (LOPROX) 0.77 % cream Apply topically 2 times daily      fexofenadine (ALLEGRA) 180 MG tablet Take 180 mg by mouth daily      fluticasone (CUTIVATE) 0.05 % cream Apply topically daily      nitroGLYCERIN (NITROSTAT) 0.4 MG SL tablet Place 0.4 mg under the tongue every 5 minutes as needed      omeprazole (PRILOSEC) 40 MG delayed release capsule TAKE 1 CAPSULE DAILY       No current facility-administered medications for this visit.        OBJECTIVE:  /82   Pulse 79   Temp 98.8 °F (37.1 °C) (Oral)   Resp 16   Ht 5' 7\" (1.702 m)   Wt 249 lb 8 oz (113.2 kg)   SpO2 98%   BMI 39.08 kg/m² Physical Exam:  Constitutional: Oriented to person, place, and time. Well-developed and well-nourished. Obese. HEENT: Normocephalic and atraumatic. Oropharynx is clear and moist.   Conjunctivae and EOM are normal. Pupils are equal, round, and reactive to light. No scleral icterus. Neck supple. No JVD present. No tracheal deviation present. No thyromegaly present. Lymph node No palpable submandibular, cervical, supraclavicular, axillary and inguinal lymph nodes. Skin Warm and dry. No bruising and no rash noted. No erythema. No pallor. Respiratory Effort normal and breath sounds normal.  No respiratory distress. No wheezes. No rales. No tenderness. CVS Normal rate, regular rhythm and normal heart sounds. Exam reveals no gallop, no friction and no rub. No murmur heard. Abdomen Soft. Bowel sounds are normal. Exhibits no distension. There is no tenderness. There is no rebound and no guarding. Neuro Normal reflexes. No cranial nerve deficit. Exhibits normal muscle tone, 5 of 5 strength of all extremities. MSK Normal range of motion in general.  No edema and no tenderness.    Psych Normal mood, affect, behavior, judgment and thought content      Labs:  Recent Results (from the past 24 hour(s))   CBC with Auto Differential    Collection Time: 02/16/23  3:15 PM   Result Value Ref Range    WBC 7.5 4.3 - 11.1 K/uL    RBC 4.86 4.23 - 5.6 M/uL    Hemoglobin 15.9 13.6 - 17.2 g/dL    Hematocrit 49.0 41.1 - 50.3 %    .8 82.0 - 102.0 FL    MCH 32.7 26.1 - 32.9 PG    MCHC 32.4 31.4 - 35.0 g/dL    RDW 14.6 11.9 - 14.6 %    Platelets 209 004 - 143 K/uL    MPV 8.9 (L) 9.4 - 12.3 FL    nRBC 0.00 0.0 - 0.2 K/uL    Differential Type AUTOMATED      Seg Neutrophils 79 (H) 43 - 78 %    Lymphocytes 8 (L) 13 - 44 %    Monocytes 9 4.0 - 12.0 %    Eosinophils % 2 0.5 - 7.8 %    Basophils 1 0.0 - 2.0 %    Immature Granulocytes 1 0.0 - 5.0 %    Segs Absolute 6.0 1.7 - 8.2 K/UL    Absolute Lymph # 0.6 0.5 - 4.6 K/UL    Absolute Mono # 0.7 0.1 - 1.3 K/UL    Absolute Eos # 0.2 0.0 - 0.8 K/UL    Basophils Absolute 0.1 0.0 - 0.2 K/UL    Absolute Immature Granulocyte 0.1 0.0 - 0.5 K/UL   Comprehensive Metabolic Panel    Collection Time: 02/16/23  3:15 PM   Result Value Ref Range    Sodium 139 133 - 143 mmol/L    Potassium 4.2 3.5 - 5.1 mmol/L    Chloride 106 101 - 110 mmol/L    CO2 29 21 - 32 mmol/L    Anion Gap 4 2 - 11 mmol/L    Glucose 101 (H) 65 - 100 mg/dL    BUN 16 8 - 23 MG/DL    Creatinine 1.10 0.8 - 1.5 MG/DL    Est, Glom Filt Rate >60 >60 ml/min/1.73m2    Calcium 9.5 8.3 - 10.4 MG/DL    Total Bilirubin 0.3 0.2 - 1.1 MG/DL    ALT 70 (H) 12 - 65 U/L    AST 27 15 - 37 U/L    Alk Phosphatase 55 50 - 136 U/L    Total Protein 8.2 6.3 - 8.2 g/dL    Albumin 4.2 3.2 - 4.6 g/dL    Globulin 4.0 2.8 - 4.5 g/dL    Albumin/Globulin Ratio 1.1 0.4 - 1.6     C-Reactive Protein    Collection Time: 02/16/23  3:15 PM   Result Value Ref Range    CRP <0.3 0.0 - 0.9 mg/dL       Imaging:  No results found for this or any previous visit. ASSESSMENT/PLAN:   Diagnosis Orders   1. Thrombophilia ([facility]        2. Protein C deficiency ([facility]        3. Cholecystitis          60 y.o. M consulted for protein C deficiency thrombophilia presented to [facility] with wife 2/16/2023. 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 He is developing cholecystitis causing more frequent pain and Dr. Irina Sommers planning cholecystectomy and consulted for anticoagulation management, recommend hold Eliquis for 48 hours prior to surgery and restart the day after surgery provided reliable hemostasis. We will follow-up with surgery and PCP and return as needed. All questions are answered to their satisfaction. They will call for further questions and concerns.         ECOG PERFORMANCE STATUS - 0-Fully active, able to carry on all pre-disease performance without restriction. Pain - 0 - No pain/10. None/Minimal pain - not affecting QOL     Fatigue - No flowsheet data found. Distress - No flowsheet data found. Elements of this note have been dictated via voice recognition software. Text and phrases may be limited by the accuracy and autoconversion of the software. The chart has been reviewed, but errors may still be present. Adela Eastman M.D.   [address]  Office : [phone]  Fax : [phone]

## 2023-02-20 RX ORDER — FERROUS FUMARATE AND POLYSACCHRIDE IRON VITAMIN MINERAL COMPLEX SUPPLEMENT 191.2; 135.9; 1; 210; 20; 5; 5; 7; 25; 3 MG/1; MG/1; MG/1; MG/1; MG/1; MG/1; MG/1; MG/1; MG/1; UG/1
CAPSULE ORAL
Qty: 90 CAPSULE | Refills: 3 | OUTPATIENT
Start: 2023-02-20

## 2023-02-23 ENCOUNTER — PREP FOR PROCEDURE (OUTPATIENT)
Dept: SURGERY | Age: 63
End: 2023-02-23

## 2023-02-23 PROBLEM — D68.59 PROTEIN C DEFICIENCY (HCC): Status: ACTIVE | Noted: 2023-02-23

## 2023-02-23 PROBLEM — K80.10 CALCULUS OF GALLBLADDER WITH CHOLECYSTITIS WITHOUT BILIARY OBSTRUCTION: Status: ACTIVE | Noted: 2023-02-23

## 2023-02-23 NOTE — PERIOP NOTE
Patient verified name and . Order for consent IS NOT found in EHR; patient verifies procedure. Type 2 surgery, phone assessment complete. Orders not received. Labs per surgeon: none in EHR at time of assessment  Labs per anesthesia protocol: none    Patient answered medical/surgical history questions at their best of ability. All prior to admission medications documented in [facility]. Patient instructed to take the following medications the day of surgery according to anesthesia guidelines with a small sip of water: Amlodipine, Imuran, Duloxetine, allergy medicine, Levothyroxine, Methylprednisolone, Metoprolol, Omeprazole  On the day before surgery please take Acetaminophen 1000mg in the morning and then again before bed. You may substitute for Tylenol 650 mg. Hold all vitamins 7 days prior to surgery and NSAIDS 5 days prior to surgery. Prescription meds to hold: HOLD Xarelto and Aspirin according to Dr. Fidel Hammonds instructions. Patient instructed on the following:    > Arrive at [facility], time of arrival to be called the day before by 1700  > NPO after midnight, unless otherwise indicated, including gum, mints, and ice chips  > Responsible adult must drive patient to the hospital, stay during surgery, and patient will need supervision 24 hours after anesthesia  > Use antibacterial soap in shower the night before surgery and on the morning of surgery  > All piercings must be removed prior to arrival.    > Leave all valuables (money and jewelry) at home but bring insurance card and ID on DOS.   > You may be required to pay a deductible or co-pay on the day of your procedure. You can pre-pay by calling [phone] if your surgery is at the [facility] or [phone] if your surgery is at the [facility]. > Do not wear make-up, nail polish, lotions, cologne, perfumes, powders, or oil on skin. Artificial nails are not permitted.      Message sent via My Chart to patient with pre-surgery instructions.

## 2023-03-03 NOTE — H&P
aDate: 3/3/2023      Name: Alejandrina Pal      MRN: [id number]       : 1960       Age: 60 y.o. Sex: male        Pam Jacques MD       CC:  No chief complaint on file. HPI:     Alejandrina Pal is a 60 y.o. male who presents for evaluation of gallbladder problems as a referral from Dr. David Manning. The patient had ultrasound at [facility] done on 22 which showed an 11 mm gallstone. He had been having RUQ pain prior to the ultrasound. A previous HIDA scan was negative. The patient is on Xarelto for a protein C deficiency. He has had three blood clots in the past. No PE'S    The patient has RUQ pain that radiates to his back. He is having pain now as he was in the office. Nausea, but no vomiting, diarrhea or GERD. The GERD may be masked by the [facility] he takes twice per day.      PMH:    Past Medical History:   Diagnosis Date    Angina pectoris ([address]     Anxiety     ASHD (arteriosclerotic heart disease)     Calcium score = 1056    Bursitis     Cancer (HCC)     basal cell carcinoma    Coronary artery disease     Coronary atherosclerosis due to calcified coronary lesion (CODE) 2016    Diverticulitis     Duodenal ulcer     DVT (deep venous thrombosis) (HCC)     2 episodes    Elevated liver enzymes 3/1/2018    GERD (gastroesophageal reflux disease)     controlled with omeprazole    HTN (hypertension)     controlled with medsication    Hypercholesterolemia     Hypothyroidism     Kidney stones     Myositis     Personal history of DVT (deep vein thrombosis) 2016    Protein C deficiency ([address]     Rhabdomyolysis 2018    [facility]    Sleep apnea     using CPAP    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 ([address]     Varicella        PSH:    Past Surgical History:   Procedure Laterality Date    CARDIAC CATHETERIZATION      no stents    COLONOSCOPY  2013    HEENT Right 2020    skin lesion bx of ear    OTHER SURGICAL HISTORY  2022    dental surgery    URETEROLITHOTOMY Right  WISDOM TOOTH EXTRACTION  1980's       MEDS:    Prior to Visit Medications    Medication Sig Taking? Authorizing Provider   Coenzyme Q10 (COQ10 PO) Take by mouth Yes Historical Provider, MD   Multiple Vitamins-Minerals (MULTIVITAMIN ADULTS PO) Take by mouth Yes Historical Provider, MD   Iron Combinations (IRON COMPLEX PO) Take by mouth Yes Historical Provider, MD   aspirin 81 MG EC tablet Take 81 mg by mouth  Historical Provider, MD   azaTHIOprine (IMURAN) 50 MG tablet Take 3 tablets in the morning and 2 tablets at night for a total dose of 250mg daily.   Leslie Handy MD   Dulaglutide (TRULICITY) 3 QT/4.1KX SOPN Inject 3 mg into the skin once a week  Leslie Handy MD   DULoxetine (CYMBALTA) 30 MG extended release capsule TAKE 1 CAPSULE DAILY  Leslie Handy MD   empagliflozin (JARDIANCE) 10 MG tablet Take 1 tablet by mouth daily  Leslie Handy MD   metFORMIN (GLUCOPHAGE) 500 MG tablet TAKE 2 TABLETS TWICE A DAY WITH MEALS  Leslie Handy MD   levothyroxine (SYNTHROID) 200 MCG tablet Take 1 tablet by mouth every morning (before breakfast)  Leslie Handy MD   methylPREDNISolone (MEDROL) 4 MG tablet Take 1 tablet by mouth daily (with breakfast)  Leslie Handy MD   amLODIPine (NORVASC) 5 MG tablet TAKE 1 TABLET DAILY  Dhruv Ortega MD   metoprolol succinate (TOPROL XL) 25 MG extended release tablet TAKE 1 TABLET DAILY  Dhruv Ortega MD   ezetimibe (ZETIA) 10 MG tablet TAKE 1 TABLET DAILY  Dhruv Ortega MD   Evolocumab (REPATHA SURECLICK) 912 MG/ML SOAJ Inject 140 mg into the skin every 14 days  Dhruv Ortega MD   rivaroxaban (XARELTO) 20 MG TABS tablet Take 1 tablet by mouth daily  Dhruv Ortega MD   losartan (COZAAR) 100 MG tablet Take 1 tablet by mouth daily TAKE 1 TABLET DAILY  Dhruv Ortega MD   hydroCHLOROthiazide (HYDRODIURIL) 12.5 MG tablet Take 1 tablet by mouth daily TAKE 1 TABLET DAILY  Dhruv Ortega MD   ciclopirox (LOPROX) 0.77 % cream Apply topically 2 times daily  Ar Automatic Reconciliation   fexofenadine (ALLEGRA) 180 MG tablet Take 180 mg by mouth daily  Patient not taking: Reported on 2023  Ar Automatic Reconciliation   fluticasone (CUTIVATE) 0.05 % cream Apply topically daily  Ar Automatic Reconciliation   nitroGLYCERIN (NITROSTAT) 0.4 MG SL tablet Place 0.4 mg under the tongue every 5 minutes as needed  Ar Automatic Reconciliation   omeprazole (PRILOSEC) 40 MG delayed release capsule 2 times daily TAKE 1 CAPSULE DAILY  Ar Automatic Reconciliation       ALLERGIES:      Allergies   Allergen Reactions    Statins Myalgia       SH:    Social History     Tobacco Use    Smoking status: Former     Packs/day: 1.00     Years: 15.00     Pack years: 15.00     Types: Cigarettes     Quit date: 2006     Years since quittin.8    Smokeless tobacco: Former     Quit date: 2006   Substance Use Topics    Alcohol use: No    Drug use: No       FH:    Family History   Problem Relation Age of Onset    Kidney Disease Son         secondary to congenital vessel disease    Kidney Disease Paternal Uncle     Diabetes Paternal Uncle     Heart Surgery Paternal Uncle         in 76s    Heart Attack Paternal Uncle     Heart Surgery Paternal [de-identified]     Alzheimer's Disease Father     Other Mother         \"valve issue\"    Heart Surgery Father         mid to late 62s    Hypertension Brother     Sleep Apnea Brother     Heart Disease Father     Pulmonary Embolism Mother         protien C deficiency       ROS: The patient has no difficulty with chest pain or shortness of breath. No fever or chills. Comprehensive 13 point review of systems was otherwise unremarkable except as noted above. Physical Exam:     There were no vitals taken for this visit. General: Alert, oriented, cooperative white male in no acute distress. Eyes: Sclera are clear. Conjunctiva and lids within normal limits. No icterus.   Ears and Nose: no gross deformities to visual inspection, gross hearing intact  Neck: Supple, trachea midline, no appreciable thyromegaly  Resp: Breathing is  non-labored. Lungs clear to auscultation without wheezing or rhonchi   CV: RRR. No murmurs, rubs or gallops appreciated. Abd: soft, obese, mild RUQ and R flank pain, active BS'S  Psych:  Mood and affect appropriate. Short-term memory and understanding intact      Assessment/Plan:  Jose Roberto King is a 60 y.o. male who has signs and symptoms consistent with cholelithiasis/cholecystitis with a protein C deficiency. 1. Hematology evaluation for anticoagulation around surgery due to protein C deficiency. Hematology said:    60 y.o. M consulted for protein C deficiency thrombophilia presented to [facility] with wife 2/16/2023. 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 He is developing cholecystitis causing more frequent pain and Dr. Valeria Samuel planning cholecystectomy and consulted for anticoagulation management, recommend hold Eliquis for 48 hours prior to surgery and restart the day after surgery provided reliable hemostasis. We will follow-up with surgery and PCP and return as needed. 2. Laparoscopic, possible open, cholecystectomy. I went through the risks of bleeding, infection and anesthesia. I went through other risks of injury to the liver, biliary tree structures, stomach, small bowel, large bowel , pancreas and the potential need to convert to an open procedure.     Manav Owens MD     FACS   3/3/2023  9:23 AM

## 2023-03-06 ENCOUNTER — ANESTHESIA EVENT (OUTPATIENT)
Dept: SURGERY | Age: 63
End: 2023-03-06
Payer: COMMERCIAL

## 2023-03-07 ENCOUNTER — ANESTHESIA (OUTPATIENT)
Dept: SURGERY | Age: 63
End: 2023-03-07
Payer: COMMERCIAL

## 2023-03-07 ENCOUNTER — HOSPITAL ENCOUNTER (OUTPATIENT)
Age: 63
Setting detail: OUTPATIENT SURGERY
Discharge: HOME | End: 2023-03-07
Attending: SURGERY | Admitting: SURGERY
Payer: COMMERCIAL

## 2023-03-07 VITALS
DIASTOLIC BLOOD PRESSURE: 66 MMHG | BODY MASS INDEX: 43.89 KG/M2 | HEART RATE: 62 BPM | TEMPERATURE: 97.3 F | RESPIRATION RATE: 16 BRPM | OXYGEN SATURATION: 95 % | SYSTOLIC BLOOD PRESSURE: 137 MMHG | WEIGHT: 247.7 LBS | HEIGHT: 63 IN

## 2023-03-07 DIAGNOSIS — D68.59 PROTEIN C DEFICIENCY (HCC): ICD-10-CM

## 2023-03-07 DIAGNOSIS — E66.01 MORBID OBESITY WITH BMI OF 40.0-44.9, ADULT (HCC): Primary | ICD-10-CM

## 2023-03-07 DIAGNOSIS — K80.12 CALCULUS OF GALLBLADDER WITH ACUTE ON CHRONIC CHOLECYSTITIS WITHOUT OBSTRUCTION: ICD-10-CM

## 2023-03-07 LAB
GLUCOSE BLD STRIP.AUTO-MCNC: 100 MG/DL (ref 65–100)
SERVICE CMNT-IMP: NORMAL

## 2023-03-07 PROCEDURE — 88307 TISSUE EXAM BY PATHOLOGIST: CPT

## 2023-03-07 PROCEDURE — 6360000002 HC RX W HCPCS: Performed by: NURSE ANESTHETIST, CERTIFIED REGISTERED

## 2023-03-07 PROCEDURE — 6360000002 HC RX W HCPCS: Performed by: SURGERY

## 2023-03-07 PROCEDURE — 88312 SPECIAL STAINS GROUP 1: CPT

## 2023-03-07 PROCEDURE — 6370000000 HC RX 637 (ALT 250 FOR IP): Performed by: ANESTHESIOLOGY

## 2023-03-07 PROCEDURE — 2500000003 HC RX 250 WO HCPCS: Performed by: NURSE ANESTHETIST, CERTIFIED REGISTERED

## 2023-03-07 PROCEDURE — 2709999900 HC NON-CHARGEABLE SUPPLY: Performed by: SURGERY

## 2023-03-07 PROCEDURE — 82962 GLUCOSE BLOOD TEST: CPT

## 2023-03-07 PROCEDURE — 88304 TISSUE EXAM BY PATHOLOGIST: CPT

## 2023-03-07 PROCEDURE — 47562 LAPAROSCOPIC CHOLECYSTECTOMY: CPT | Performed by: SURGERY

## 2023-03-07 PROCEDURE — 7100000011 HC PHASE II RECOVERY - ADDTL 15 MIN: Performed by: SURGERY

## 2023-03-07 PROCEDURE — 7100000010 HC PHASE II RECOVERY - FIRST 15 MIN: Performed by: SURGERY

## 2023-03-07 PROCEDURE — 6360000002 HC RX W HCPCS: Performed by: ANESTHESIOLOGY

## 2023-03-07 PROCEDURE — 3700000001 HC ADD 15 MINUTES (ANESTHESIA): Performed by: SURGERY

## 2023-03-07 PROCEDURE — 47001 NDL BIOPSY LVR TM OTH MAJ PX: CPT | Performed by: SURGERY

## 2023-03-07 PROCEDURE — 2500000003 HC RX 250 WO HCPCS: Performed by: SURGERY

## 2023-03-07 PROCEDURE — 2580000003 HC RX 258: Performed by: ANESTHESIOLOGY

## 2023-03-07 PROCEDURE — 7100000001 HC PACU RECOVERY - ADDTL 15 MIN: Performed by: SURGERY

## 2023-03-07 PROCEDURE — 7100000000 HC PACU RECOVERY - FIRST 15 MIN: Performed by: SURGERY

## 2023-03-07 PROCEDURE — 3700000000 HC ANESTHESIA ATTENDED CARE: Performed by: SURGERY

## 2023-03-07 PROCEDURE — 3600000004 HC SURGERY LEVEL 4 BASE: Performed by: SURGERY

## 2023-03-07 PROCEDURE — 3600000014 HC SURGERY LEVEL 4 ADDTL 15MIN: Performed by: SURGERY

## 2023-03-07 PROCEDURE — 88313 SPECIAL STAINS GROUP 2: CPT

## 2023-03-07 RX ORDER — SODIUM CHLORIDE 0.9 % (FLUSH) 0.9 %
5-40 SYRINGE (ML) INJECTION PRN
Status: DISCONTINUED | OUTPATIENT
Start: 2023-03-07 | End: 2023-03-07 | Stop reason: HOSPADM

## 2023-03-07 RX ORDER — SODIUM CHLORIDE, SODIUM LACTATE, POTASSIUM CHLORIDE, CALCIUM CHLORIDE 600; 310; 30; 20 MG/100ML; MG/100ML; MG/100ML; MG/100ML
INJECTION, SOLUTION INTRAVENOUS CONTINUOUS
Status: DISCONTINUED | OUTPATIENT
Start: 2023-03-07 | End: 2023-03-07 | Stop reason: HOSPADM

## 2023-03-07 RX ORDER — DIPHENHYDRAMINE HYDROCHLORIDE 50 MG/ML
12.5 INJECTION INTRAMUSCULAR; INTRAVENOUS
Status: DISCONTINUED | OUTPATIENT
Start: 2023-03-07 | End: 2023-03-07 | Stop reason: HOSPADM

## 2023-03-07 RX ORDER — SUCCINYLCHOLINE/SOD CL,ISO/PF 200MG/10ML
SYRINGE (ML) INTRAVENOUS PRN
Status: DISCONTINUED | OUTPATIENT
Start: 2023-03-07 | End: 2023-03-07 | Stop reason: SDUPTHER

## 2023-03-07 RX ORDER — GLYCOPYRROLATE 0.2 MG/ML
INJECTION INTRAMUSCULAR; INTRAVENOUS PRN
Status: DISCONTINUED | OUTPATIENT
Start: 2023-03-07 | End: 2023-03-07 | Stop reason: SDUPTHER

## 2023-03-07 RX ORDER — NEOSTIGMINE METHYLSULFATE 1 MG/ML
INJECTION, SOLUTION INTRAVENOUS PRN
Status: DISCONTINUED | OUTPATIENT
Start: 2023-03-07 | End: 2023-03-07 | Stop reason: SDUPTHER

## 2023-03-07 RX ORDER — DEXAMETHASONE SODIUM PHOSPHATE 10 MG/ML
INJECTION INTRAMUSCULAR; INTRAVENOUS PRN
Status: DISCONTINUED | OUTPATIENT
Start: 2023-03-07 | End: 2023-03-07 | Stop reason: SDUPTHER

## 2023-03-07 RX ORDER — ACETAMINOPHEN 500 MG
1000 TABLET ORAL ONCE
Status: COMPLETED | OUTPATIENT
Start: 2023-03-07 | End: 2023-03-07

## 2023-03-07 RX ORDER — LIDOCAINE HYDROCHLORIDE 10 MG/ML
1 INJECTION, SOLUTION INFILTRATION; PERINEURAL
Status: DISCONTINUED | OUTPATIENT
Start: 2023-03-07 | End: 2023-03-07 | Stop reason: HOSPADM

## 2023-03-07 RX ORDER — OXYCODONE HYDROCHLORIDE 5 MG/1
10 TABLET ORAL PRN
Status: COMPLETED | OUTPATIENT
Start: 2023-03-07 | End: 2023-03-07

## 2023-03-07 RX ORDER — LIDOCAINE HYDROCHLORIDE 20 MG/ML
INJECTION, SOLUTION EPIDURAL; INFILTRATION; INTRACAUDAL; PERINEURAL PRN
Status: DISCONTINUED | OUTPATIENT
Start: 2023-03-07 | End: 2023-03-07 | Stop reason: SDUPTHER

## 2023-03-07 RX ORDER — ONDANSETRON 2 MG/ML
4 INJECTION INTRAMUSCULAR; INTRAVENOUS
Status: COMPLETED | OUTPATIENT
Start: 2023-03-07 | End: 2023-03-07

## 2023-03-07 RX ORDER — ROCURONIUM BROMIDE 10 MG/ML
INJECTION, SOLUTION INTRAVENOUS PRN
Status: DISCONTINUED | OUTPATIENT
Start: 2023-03-07 | End: 2023-03-07 | Stop reason: SDUPTHER

## 2023-03-07 RX ORDER — PROCHLORPERAZINE EDISYLATE 5 MG/ML
5 INJECTION INTRAMUSCULAR; INTRAVENOUS
Status: COMPLETED | OUTPATIENT
Start: 2023-03-07 | End: 2023-03-07

## 2023-03-07 RX ORDER — EPHEDRINE SULFATE/0.9% NACL/PF 50 MG/5 ML
SYRINGE (ML) INTRAVENOUS PRN
Status: DISCONTINUED | OUTPATIENT
Start: 2023-03-07 | End: 2023-03-07 | Stop reason: SDUPTHER

## 2023-03-07 RX ORDER — OXYCODONE HYDROCHLORIDE 5 MG/1
5 TABLET ORAL PRN
Status: COMPLETED | OUTPATIENT
Start: 2023-03-07 | End: 2023-03-07

## 2023-03-07 RX ORDER — SODIUM CHLORIDE 9 MG/ML
INJECTION, SOLUTION INTRAVENOUS PRN
Status: DISCONTINUED | OUTPATIENT
Start: 2023-03-07 | End: 2023-03-07 | Stop reason: HOSPADM

## 2023-03-07 RX ORDER — ONDANSETRON 2 MG/ML
INJECTION INTRAMUSCULAR; INTRAVENOUS PRN
Status: DISCONTINUED | OUTPATIENT
Start: 2023-03-07 | End: 2023-03-07 | Stop reason: SDUPTHER

## 2023-03-07 RX ORDER — MIDAZOLAM HYDROCHLORIDE 1 MG/ML
2 INJECTION INTRAMUSCULAR; INTRAVENOUS
Status: COMPLETED | OUTPATIENT
Start: 2023-03-07 | End: 2023-03-07

## 2023-03-07 RX ORDER — OXYCODONE HYDROCHLORIDE 5 MG/1
5 TABLET ORAL EVERY 6 HOURS PRN
Qty: 20 TABLET | Refills: 0 | Status: SHIPPED | OUTPATIENT
Start: 2023-03-07 | End: 2023-03-12

## 2023-03-07 RX ORDER — PROPOFOL 10 MG/ML
INJECTION, EMULSION INTRAVENOUS PRN
Status: DISCONTINUED | OUTPATIENT
Start: 2023-03-07 | End: 2023-03-07 | Stop reason: SDUPTHER

## 2023-03-07 RX ORDER — BUPIVACAINE HYDROCHLORIDE 5 MG/ML
INJECTION, SOLUTION EPIDURAL; INTRACAUDAL PRN
Status: DISCONTINUED | OUTPATIENT
Start: 2023-03-07 | End: 2023-03-07 | Stop reason: HOSPADM

## 2023-03-07 RX ORDER — SODIUM CHLORIDE 0.9 % (FLUSH) 0.9 %
5-40 SYRINGE (ML) INJECTION EVERY 12 HOURS SCHEDULED
Status: DISCONTINUED | OUTPATIENT
Start: 2023-03-07 | End: 2023-03-07 | Stop reason: HOSPADM

## 2023-03-07 RX ORDER — FENTANYL CITRATE 50 UG/ML
INJECTION, SOLUTION INTRAMUSCULAR; INTRAVENOUS PRN
Status: DISCONTINUED | OUTPATIENT
Start: 2023-03-07 | End: 2023-03-07 | Stop reason: SDUPTHER

## 2023-03-07 RX ADMIN — HYDROMORPHONE HYDROCHLORIDE 0.5 MG: 1 INJECTION, SOLUTION INTRAMUSCULAR; INTRAVENOUS; SUBCUTANEOUS at 10:31

## 2023-03-07 RX ADMIN — ROCURONIUM BROMIDE 10 MG: 10 INJECTION, SOLUTION INTRAVENOUS at 09:51

## 2023-03-07 RX ADMIN — Medication 180 MG: at 09:12

## 2023-03-07 RX ADMIN — LIDOCAINE HYDROCHLORIDE 100 MG: 20 INJECTION, SOLUTION EPIDURAL; INFILTRATION; INTRACAUDAL; PERINEURAL at 09:12

## 2023-03-07 RX ADMIN — ONDANSETRON 4 MG: 2 INJECTION INTRAMUSCULAR; INTRAVENOUS at 09:25

## 2023-03-07 RX ADMIN — GLYCOPYRROLATE 0.8 MG: 0.2 INJECTION INTRAMUSCULAR; INTRAVENOUS at 10:02

## 2023-03-07 RX ADMIN — SODIUM CHLORIDE, SODIUM LACTATE, POTASSIUM CHLORIDE, AND CALCIUM CHLORIDE: 600; 310; 30; 20 INJECTION, SOLUTION INTRAVENOUS at 09:02

## 2023-03-07 RX ADMIN — ONDANSETRON 4 MG: 2 INJECTION INTRAMUSCULAR; INTRAVENOUS at 10:51

## 2023-03-07 RX ADMIN — HYDROMORPHONE HYDROCHLORIDE 0.5 MG: 1 INJECTION, SOLUTION INTRAMUSCULAR; INTRAVENOUS; SUBCUTANEOUS at 10:50

## 2023-03-07 RX ADMIN — ROCURONIUM BROMIDE 5 MG: 10 INJECTION, SOLUTION INTRAVENOUS at 09:12

## 2023-03-07 RX ADMIN — ACETAMINOPHEN 1000 MG: 500 TABLET, FILM COATED ORAL at 08:21

## 2023-03-07 RX ADMIN — HYDROMORPHONE HYDROCHLORIDE 0.5 MG: 1 INJECTION, SOLUTION INTRAMUSCULAR; INTRAVENOUS; SUBCUTANEOUS at 10:40

## 2023-03-07 RX ADMIN — ROCURONIUM BROMIDE 25 MG: 10 INJECTION, SOLUTION INTRAVENOUS at 09:19

## 2023-03-07 RX ADMIN — DEXAMETHASONE SODIUM PHOSPHATE 8 MG: 10 INJECTION INTRAMUSCULAR; INTRAVENOUS at 09:25

## 2023-03-07 RX ADMIN — PHENYLEPHRINE HYDROCHLORIDE 50 MCG: 0.1 INJECTION, SOLUTION INTRAVENOUS at 09:37

## 2023-03-07 RX ADMIN — Medication 5 MG: at 10:02

## 2023-03-07 RX ADMIN — Medication 12.5 MG: at 09:33

## 2023-03-07 RX ADMIN — Medication 2000 MG: at 09:17

## 2023-03-07 RX ADMIN — PROPOFOL 200 MG: 10 INJECTION, EMULSION INTRAVENOUS at 09:12

## 2023-03-07 RX ADMIN — PROCHLORPERAZINE EDISYLATE 5 MG: 5 INJECTION INTRAMUSCULAR; INTRAVENOUS at 10:32

## 2023-03-07 RX ADMIN — FENTANYL CITRATE 100 MCG: 50 INJECTION, SOLUTION INTRAMUSCULAR; INTRAVENOUS at 09:12

## 2023-03-07 RX ADMIN — MIDAZOLAM 2 MG: 1 INJECTION INTRAMUSCULAR; INTRAVENOUS at 08:41

## 2023-03-07 RX ADMIN — SODIUM CHLORIDE, SODIUM LACTATE, POTASSIUM CHLORIDE, AND CALCIUM CHLORIDE: 600; 310; 30; 20 INJECTION, SOLUTION INTRAVENOUS at 08:33

## 2023-03-07 RX ADMIN — OXYCODONE 5 MG: 5 TABLET ORAL at 10:47

## 2023-03-07 ASSESSMENT — PAIN DESCRIPTION - ORIENTATION
ORIENTATION: RIGHT;LEFT;MID
ORIENTATION: RIGHT;LEFT;MID

## 2023-03-07 ASSESSMENT — PAIN SCALES - GENERAL
PAINLEVEL_OUTOF10: 7
PAINLEVEL_OUTOF10: 8
PAINLEVEL_OUTOF10: 7
PAINLEVEL_OUTOF10: 8
PAINLEVEL_OUTOF10: 5
PAINLEVEL_OUTOF10: 6
PAINLEVEL_OUTOF10: 3

## 2023-03-07 ASSESSMENT — PAIN DESCRIPTION - LOCATION
LOCATION: ABDOMEN
LOCATION: ABDOMEN

## 2023-03-07 ASSESSMENT — PAIN DESCRIPTION - DESCRIPTORS
DESCRIPTORS: DISCOMFORT;TENDER
DESCRIPTORS: DISCOMFORT;TENDER

## 2023-03-07 ASSESSMENT — PAIN DESCRIPTION - PAIN TYPE
TYPE: SURGICAL PAIN
TYPE: SURGICAL PAIN

## 2023-03-07 ASSESSMENT — PAIN SCALES - WONG BAKER
WONGBAKER_NUMERICALRESPONSE: 0
WONGBAKER_NUMERICALRESPONSE: 0

## 2023-03-07 ASSESSMENT — PAIN - FUNCTIONAL ASSESSMENT: PAIN_FUNCTIONAL_ASSESSMENT: 0-10

## 2023-03-07 NOTE — DISCHARGE INSTRUCTIONS
1. Diet as tolerated except for a  low fat diet after laparoscopic cholecystectomy. 2. Showering is allowed, but no tub baths, hot tubs or swimming. 3. Drainage is common from the wounds. Change the dressings as needed. Call our office if the wounds become reddened, tender, feel warm to the touch or pus starts to drain from the wound. 4. Take prescribed pain medication as directed, usually Percocet, Norco, Ultram or Dilaudid. Take over the counter medication for minor pain. 5. Ice may be applied intermittently to the surgical site or sites. 6. Call or office, [phone], if problems arise. 7. Follow up in the office at the assigned time. 8. Resume all medications as taken per surgery, unless specifically instructed not to take certain ones. 9. No lifting more than 25 pounds until told otherwise. 10. Driving is allowed 3 days after surgery as long as you feel comfortable enough to drive and have not taken any prescription pain medication prior to driving. After general anesthesia or intravenous sedation, for 24 hours or while taking prescription Narcotics:  Limit your activities  A responsible adult needs to be with you for the next 24 hours  Do not drive and operate hazardous machinery  Do not make important personal or business decisions  Do not drink alcoholic beverages  If you have not urinated within 8 hours after discharge, and you are experiencing discomfort from urinary retention, please go to the nearest ED. If you have sleep apnea and have a CPAP machine, please use it for all naps and sleeping. Please use caution when taking narcotics and any of your home medications that may cause drowsiness. *  Please give a list of your current medications to your Primary Care Provider. *  Please update this list whenever your medications are discontinued, doses are      changed, or new medications (including over-the-counter products) are added.   *  Please carry medication information at all times in case of emergency situations. These are general instructions for a healthy lifestyle:  No smoking/ No tobacco products/ Avoid exposure to second hand smoke  Surgeon General's Warning:  Quitting smoking now greatly reduces serious risk to your health. Obesity, smoking, and sedentary lifestyle greatly increases your risk for illness  A healthy diet, regular physical exercise & weight monitoring are important for maintaining a healthy lifestyle    You may be retaining fluid if you have a history of heart failure or if you experience any of the following symptoms:  Weight gain of 3 pounds or more overnight or 5 pounds in a week, increased swelling in our hands or feet or shortness of breath while lying flat in bed. Please call your doctor as soon as you notice any of these symptoms; do not wait until your next office visit. 1. Diet as tolerated except for a  low fat diet after laparoscopic cholecystectomy. 2. Showering is allowed, but no tub baths, hot tubs or swimming. 3. Drainage is common from the wounds. Change the dressings as needed. Call our office if the wounds become reddened, tender, feel warm to the touch or pus starts to drain from the wound. 4. Take prescribed pain medication as directed, usually Percocet, Norco, Ultram or Dilaudid. Take over the counter medication for minor pain. 5. Ice may be applied intermittently to the surgical site or sites. 6. Call or office, [phone], if problems arise. 7. Follow up in the office at the assigned time. 8. Resume all medications as taken per surgery, unless specifically instructed not to take certain ones. 9. No lifting more than 25 pounds until told otherwise. 10. Driving is allowed 3 days after surgery as long as you feel comfortable enough to drive and have not taken any prescription pain medication prior to driving. 11. Resume Xarelto on 3/10/23.

## 2023-03-07 NOTE — INTERVAL H&P NOTE
Update History & Physical    The patient's History and Physical of 3/3/23 was reviewed with the patient and I examined the patient. There was no change. The surgical site was confirmed by the patient and me. Plan: The risks, benefits, expected outcome, and alternative to the recommended procedure have been discussed with the patient. Patient understands and wants to proceed with the procedure.      Electronically signed by Cassi Carroll MD on 3/7/2023 at 8:42 AM

## 2023-03-07 NOTE — BRIEF OP NOTE
Brief Postoperative Note      Patient: Jenna Espinoza  YOB: 1960  MRN: [id number]    Date of Procedure: 3/7/2023    Pre-Op Diagnosis: Calculus of gallbladder with cholecystitis without biliary obstruction, unspecified cholecystitis acuity [K80.10]  Morbid obesity with BMI of 40.0-44.9, adult ([facility] [E66.01, Z68.41]  Chronic anticoagulation [Z79.01]  Protein C deficiency ([facility] [D68.59]    Post-Op Diagnosis: Same as the above with hepatomegaly and fibrosis of the liver       Procedure(s):  LAPAROSCOPIC CHOLECYSTECTOMY PLUS TAJ-CUT LIVER BIOPSIES    Surgeon(s):  Jeannine Kohler MD    Assistant:  * No surgical staff found *    Anesthesia: General plus local    Estimated Blood Loss (mL): less than 50     Complications: None    Specimens:   ID Type Source Tests Collected by Time Destination   A : GALLBLADDER Tissue Gallbladder SURGICAL PATHOLOGY Jeannine Kohler MD 3/7/2023 9401    B : Liver Biopsy Tissue Liver SURGICAL PATHOLOGY Jeannine Kohler MD 3/7/2023 1002        Implants:  * No implants in log *      Drains: * No LDAs found *    Findings: See dictated note.     Electronically signed by Graham Thomas MD on 3/7/2023 at 10:14 AM

## 2023-03-07 NOTE — OP NOTE
[place]  OPERATIVE REPORT    Name:  Cary Bhatti  MR#:  [id number]  :  1960  ACCOUNT #:  [de-identified]  DATE OF SERVICE:  2023    PREOPERATIVE DIAGNOSES:  1. Cholelithiasis. 2.  Cholecystitis. 3.  Morbid obesity with a body mass index of 44.  4.  Chronic anticoagulation use. 5.  Protein C deficiency with need for chronic anticoagulation. POSTOPERATIVE DIAGNOSES:  1. Cholelithiasis. 2.  Cholecystitis. 3.  Morbid obesity with a body mass index of 44.  4.  Chronic anticoagulation use. 5.  Protein C deficiency with need for chronic anticoagulation. 6.  Hepatomegaly and fibrosis of the liver encountered. PROCEDURE PERFORMED:  Laparoscopic cholecystectomy with Tomer-Cut liver biopsies as a second procedure. SURGEON:  Ned Carpenter. Boogie High MD    ASSISTANT:  None. ANESTHESIA:  General endotracheal anesthesia by Dr. Ian Gonzales. COMPLICATIONS:  None. SPECIMENS REMOVED:  1.  Gallbladder. 2.  Toemr-Cut liver biopsies of the right lobe of the liver. IMPLANTS:  None. ESTIMATED BLOOD LOSS:  Less than 50 mL. DRAINS:  None. HISTORY:  A 76-year-old male who I was asked to see by Dr. Luis Caballero. The patient was having upper abdominal pain. An ultrasound showed cholelithiasis, cholecystitis, and thyromegaly. I scheduled the patient for laparoscopic possible open cholecystectomy. He went to see Dr. Néstor Benites of Hematology/Oncology for a discussion of pre and postoperative anticoagulation. It was decided by Dr. Néstor Benites to hold the Xarelto for 2 days and restart it 2 days after surgery. I saw the patient back in the office to discuss these recommendations as well as to go over the procedure in depth. I went over risks of bleeding, infection, anesthesia, injury to the liver, biliary tree structures, small bowel, large bowel, stomach, pancreas, potential need to convert to an open procedure.   I said in his case DVT and potential pulmonary emboli were higher due to his protein C deficiency. I also went over wound infections, wound dehiscence, incisional hernias. I said he was at higher risk due to his age at 60. He has multiple medical problems and has morbid obesity. He was agreeable, signed a consent form, was scheduled for today. PROCEDURE:  The patient was seen in the preop area with his wife and then transported to room #2 [address] [place] [address].  He was placed on the operating room table in the supine position where general endotracheal anesthesia was administered without complications. Because of his weight, he received 3 g of Ancef as prophylactic antibiotic coverage. Sequential compression devices were placed and used throughout the procedure. The abdomen was prepped and draped in the usual sterile manner. We waited for the prep to dry for 3 minutes before draping. Once draped, I did a time-out identifying the patient, surgeon, procedure, and his birth date of 1960. When everyone in the room agreed, we began the procedure. I made an incision below the umbilicus in the midline dividing skin and soft tissue with the 15 scalpel blade. We then divided with the electrocautery the remainder of the soft tissue down to the fascia. Fascia was incised with 15 scalpel blade. I then put my index finger through the peritoneum so as not to injure any of the underlying bowel. We placed two stay sutures of 0 Vicryl on either side of the transected fascia and used to secure a Rolf trocar that was placed into the peritoneal cavity. The peritoneal cavity was insufflated to 15 mmHg using carbon dioxide gas. The patient was positioned in reverse Trendelenburg with the right side up left side down to help gain access to the gallbladder. A 10 mm 30-degree scope was inserted. The patient had a large omentum. We had to push the Rolf all the way down towards the abdomen to get the camera to come in over the omentum.   We placed three 5 mm trocars, one in the epigastric region, one in the right upper quadrant in the midclavicular line, and one in the right anterior axillary line. The liver was found to be quite large with hepatomegaly and was very fibrotic, very difficult to lift up, very heavy. We were able to grasp the fundus of the gallbladder which was quite lateral and tented up towards the right hemidiaphragm. We got a second grasper on the hilum. I placed a 5 mm trocar in the midclavicular line at the level of the umbilicus to hold the transverse colon and omentum which were obscuring view of the hilum due to air and stool in the transverse colon. Once we had this fifth trocar in place, we were able to see the hilum. I dissected the area of Calot's triangle, isolated the cystic duct, cystic artery, saw the critical view of safety, identified the common bile duct and then clipped the cystic artery and cystic duct with two clips on the stay side, once on the specimen side. Structures were divided with 5 mm harmonic scalpel. I began dissecting the gallbladder off the liver with the harmonic scalpel, but could not get a proper angle. I then switched over to the spatula and electrocautery and removed the gallbladder easily. Once disconnected from all attachments, we changed the 10 mm 30-degree scope to a 5 mm 30-degree scope that was placed through the epigastric port. An Endopouch was brought on the field and placed through the Rolf. We watched it come in over the omentum so as not to injure anything and then opened the Endopouch within the abdominal cavity. Gallbladder was placed within the Endopouch, Endopouch closed and withdrawn under direct vision of the 5 mm 30-degree scope in the epigastric port. Gallbladder was removed from the peritoneal cavity, sent to Pathology for evaluation as a specimen. Rolf trocar was returned to the umbilical incision site. The 10 mm 30-degree scope was reinserted.   I then made an incision in the midclavicular line superior or cephalad to the 5 mm midclavicular line port.  I used Tomer-Cut liver biopsy to do three biopsies of the right lobe of the liver.  Each time we withdrew the Tomer-Cut needle, I cauterized the site with the spatula and electrocautery.  We checked the gallbladder fossa at the end of the procedure.  There was no bleeding or bile leak.  The clips on the cystic  duct stump were intact.  Cystic artery stump had two clips on it, no bleeding or bile leak was noted.  Total blood loss less than 50 mL.  We cauterized the site where the Tomer-Cut liver biopsy had been introduced as there was some bleeding at this site.  This was done with the spatula and electrocautery.  All of the four 5 mm trocars, each were cauterized with the spatula and electrocautery, no bleeding was noted after doing this.  Rolf trocar was removed.  The fascia of the umbilicus was closed with interrupted sutures of 0 Vicryl.  Port sites were closed with surgical staples.  I injected 30 mL of 0.5% Marcaine around the wound sites.  The patient tolerated the procedure well, was extubated, brought to recovery room in stable condition, should be able to go home later today.  I did take couple pictures of the hepatomegaly and fibrotic liver for the patient's wife.  He will resume his Xarelto on 03/10/2023, in the morning.      MD STEPHON [id number]  D:  03/07/2023 10:46  T:  03/07/2023 13:08  JOB #:  [id number]

## 2023-03-07 NOTE — ANESTHESIA PRE PROCEDURE
Department of Anesthesiology  Preprocedure Note       Name:  Dorina Paget   Age:  60 y.o.  :  1960                                          MRN:  [id number]         Date:  3/7/2023      Surgeon: Carmelo Query):  Silvia Arboleda MD    Procedure: Procedure(s):  LAPAROSCOPIC, POSSIBLE OPEN, CHOLECYSTECTOMY    Medications prior to admission:   Prior to Admission medications    Medication Sig Start Date End Date Taking? Authorizing Provider   Coenzyme Q10 (COQ10 PO) Take by mouth   Yes Historical Provider, MD   Multiple Vitamins-Minerals (MULTIVITAMIN ADULTS PO) Take by mouth   Yes Historical Provider, MD   Iron Combinations (IRON COMPLEX PO) Take by mouth   Yes Historical Provider, MD   aspirin 81 MG EC tablet Take 81 mg by mouth 11   Historical Provider, MD   azaTHIOprine (IMURAN) 50 MG tablet Take 3 tablets in the morning and 2 tablets at night for a total dose of 250mg daily.  23   Shaun Nguyen MD   Dulaglutide (TRULICITY) 3 SW/4.0MK SOPN Inject 3 mg into the skin once a week 23   Shaun Nguyen MD   DULoxetine (CYMBALTA) 30 MG extended release capsule TAKE 1 CAPSULE DAILY 23   Shaun Nguyen MD   empagliflozin (JARDIANCE) 10 MG tablet Take 1 tablet by mouth daily 23   Shaun Nguyen MD   metFORMIN (GLUCOPHAGE) 500 MG tablet TAKE 2 TABLETS TWICE A DAY WITH MEALS 23   Shaun Nguyen MD   levothyroxine (SYNTHROID) 200 MCG tablet Take 1 tablet by mouth every morning (before breakfast) 23   Shaun Nguyen MD   methylPREDNISolone (MEDROL) 4 MG tablet Take 1 tablet by mouth daily (with breakfast) 23   Shaun Nguyen MD   amLODIPine (NORVASC) 5 MG tablet TAKE 1 TABLET DAILY 23   Sadia Jarvis MD   metoprolol succinate (TOPROL XL) 25 MG extended release tablet TAKE 1 TABLET DAILY 23   Sadia Jarvis MD   ezetimibe (ZETIA) 10 MG tablet TAKE 1 TABLET DAILY 22   Sadia Jarvis MD   Evolocumab (Brynda Jump) 92 MG/ML SOAJ Inject 140 mg into the skin every 14 days 11/8/22   Darylene Hoyle, MD   rivaroxaban (XARELTO) 20 MG TABS tablet Take 1 tablet by mouth daily 11/7/22   Darylene Hoyle, MD   losartan (COZAAR) 100 MG tablet Take 1 tablet by mouth daily TAKE 1 TABLET DAILY 11/7/22   Darylene Hoyle, MD   hydroCHLOROthiazide (HYDRODIURIL) 12.5 MG tablet Take 1 tablet by mouth daily TAKE 1 TABLET DAILY 11/7/22   Darylene Hoyle, MD   ciclopirox (LOPROX) 0.77 % cream Apply topically 2 times daily 11/21/19   Ar Automatic Reconciliation   fexofenadine (ALLEGRA) 180 MG tablet Take 180 mg by mouth daily  Patient not taking: No sig reported    Ar Automatic Reconciliation   fluticasone (CUTIVATE) 0.05 % cream Apply topically daily    Ar Automatic Reconciliation   nitroGLYCERIN (NITROSTAT) 0.4 MG SL tablet Place 0.4 mg under the tongue every 5 minutes as needed  Patient not taking: Reported on 3/7/2023 9/25/20   Ar Automatic Reconciliation   omeprazole (PRILOSEC) 40 MG delayed release capsule 2 times daily TAKE 1 CAPSULE DAILY 1/14/22   Ar Automatic Reconciliation       Current medications:    Current Facility-Administered Medications   Medication Dose Route Frequency Provider Last Rate Last Admin    ceFAZolin (ANCEF) 2000 mg in sterile water 20 mL IV syringe  2,000 mg IntraVENous On Call Charles Molina MD        lidocaine 1 % injection 1 mL  1 mL IntraDERmal Once PRN Felice Ji IV, MD        lactated ringers IV soln infusion   IntraVENous Continuous Felice Ji IV,  mL/hr at 03/07/23 0833 New Bag at 03/07/23 0474    sodium chloride flush 0.9 % injection 5-40 mL  5-40 mL IntraVENous 2 times per day Felice Ji IV, MD        sodium chloride flush 0.9 % injection 5-40 mL  5-40 mL IntraVENous PRN Felice Ji IV, MD        0.9 % sodium chloride infusion   IntraVENous PRN Felice Ji IV, MD        midazolam (VERSED) injection 2 mg  2 mg IntraVENous Once PRN Simin Werner MD Allergies: Allergies   Allergen Reactions    Statins Myalgia       Problem List:    Patient Active Problem List   Diagnosis Code    Type 2 diabetes mellitus (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) E66.01, Z68.41    Obesity (BMI 30-39. 9) E66.9    Coronary artery disease I25.10    Elevated CK R74.8    ASHD (arteriosclerotic heart disease) I25.10    Hypoxemia R09.02    Coronary atherosclerosis due to calcified coronary lesion (CODE) I25.84    Calculus of gallbladder with cholecystitis without biliary obstruction K80.10    Protein C deficiency ([facility]) D68.59       Past Medical History:        Diagnosis Date    Angina pectoris ([facility])     Anxiety     ASHD (arteriosclerotic heart disease) 2014    Calcium score = 1056    Bursitis     Cancer ([facility]     basal cell carcinoma    Coronary artery disease     Coronary atherosclerosis due to calcified coronary lesion (CODE) 5/23/2016    Diverticulitis     Duodenal ulcer     DVT (deep venous thrombosis) ([facility])     2 episodes    Elevated liver enzymes 3/1/2018    GERD (gastroesophageal reflux disease)     controlled with omeprazole    HTN (hypertension)     controlled with medsication    Hypercholesterolemia     Hypothyroidism     Kidney stones     Myositis     Personal history of DVT (deep vein thrombosis) 11/18/2016    Protein C deficiency ([facility]     Rhabdomyolysis 2018    [facility]    Sleep apnea     using CPAP    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 ([facility]     Varicella        Past Surgical History:        Procedure Laterality Date    CARDIAC CATHETERIZATION  2015    no stents    COLONOSCOPY  2013    HEENT Right 2020    skin lesion bx of ear    OTHER SURGICAL HISTORY  2022    dental surgery    URETEROLITHOTOMY Right 2013    WISDOM TOOTH EXTRACTION         Social History:    Social History     Tobacco Use    Smoking status: Former     Packs/day: 1.00     Years: 15.00     Pack years: 15.00     Types: Cigarettes     Quit date: 2006     Years since quittin.8    Smokeless tobacco: Former     Quit date: 2006   Substance Use Topics    Alcohol use: No                                Counseling given: Not Answered      Vital Signs (Current):   Vitals:    23 0810   BP: (!) 154/81   Pulse: 68   Resp: 16   Temp: 97.3 °F (36.3 °C)   TempSrc: Tympanic   SpO2: 98%   Weight: 247 lb 11.2 oz (112.4 kg)   Height: 5' 3\" (1.6 m)                                              BP Readings from Last 3 Encounters:   23 (!) 154/81   23 127/82   23 135/79       NPO Status: Time of last liquid consumption:                         Time of last solid consumption:                         Date of last liquid consumption: 23                        Date of last solid food consumption: 23    BMI:   Wt Readings from Last 3 Encounters:   23 247 lb 11.2 oz (112.4 kg)   23 249 lb 8 oz (113.2 kg)   23 250 lb (113.4 kg)     Body mass index is 43.88 kg/m².     CBC:   Lab Results   Component Value Date/Time    WBC 7.5 2023 03:15 PM    RBC 4.86 2023 03:15 PM    HGB 15.9 2023 03:15 PM    HCT 49.0 2023 03:15 PM    .8 2023 03:15 PM    RDW 14.6 2023 03:15 PM     2023 03:15 PM       CMP:   Lab Results   Component Value Date/Time     2023 03:15 PM    K 4.2 2023 03:15 PM     2023 03:15 PM    CO2 29 2023 03:15 PM    BUN 16 02/16/2023 03:15 PM    CREATININE 1.10 02/16/2023 03:15 PM    GFRAA >60 09/16/2022 10:31 AM    AGRATIO 2.0 02/25/2022 10:45 AM    LABGLOM >60 02/16/2023 03:15 PM    GLUCOSE 101 02/16/2023 03:15 PM    PROT 8.2 02/16/2023 03:15 PM    CALCIUM 9.5 02/16/2023 03:15 PM    BILITOT 0.3 02/16/2023 03:15 PM    ALKPHOS 55 02/16/2023 03:15 PM    ALKPHOS 45 02/25/2022 10:45 AM    AST 27 02/16/2023 03:15 PM    ALT 70 02/16/2023 03:15 PM       POC Tests: No results for input(s): POCGLU, POCNA, POCK, POCCL, POCBUN, POCHEMO, POCHCT in the last 72 hours. Coags: No results found for: PROTIME, INR, APTT    HCG (If Applicable): No results found for: PREGTESTUR, PREGSERUM, HCG, HCGQUANT     ABGs: No results found for: PHART, PO2ART, IBQ1MEC, UDZ6YWB, BEART, Z1JZWNOV     Type & Screen (If Applicable):  No results found for: LABABO, LABRH    Drug/Infectious Status (If Applicable):  Lab Results   Component Value Date/Time    HEPCAB <0.1 06/26/2015 08:55 AM       COVID-19 Screening (If Applicable): No results found for: COVID19        Anesthesia Evaluation  Patient summary reviewed and Nursing notes reviewed  Airway: Mallampati: II  TM distance: >3 FB   Neck ROM: full  Comment: beard  Mouth opening: > = 3 FB   Dental: normal exam         Pulmonary: breath sounds clear to auscultation  (+) sleep apnea: on CPAP,                             Cardiovascular:  Exercise tolerance: good (>4 METS),   (+) hypertension:, CAD (calcium score 1000): non-obstructive,         Rhythm: regular  Rate: normal                    Neuro/Psych:               GI/Hepatic/Renal:   (+) GERD: well controlled,           Endo/Other:    (+) DiabetesType II DM, well controlled, , hypothyroidism::., .                  ROS comment: Polymyositis on steroids (now 4mg methylpred, was on 80mg daily, also on imuran) Abdominal:             Vascular: Other Findings:           Anesthesia Plan      general     ASA 3       Induction: intravenous.     MIPS: Postoperative opioids intended and Prophylactic antiemetics administered. Anesthetic plan and risks discussed with patient and spouse.                         Juan Hess MD   3/7/2023

## 2023-03-07 NOTE — ANESTHESIA POSTPROCEDURE EVALUATION
Department of Anesthesiology  Postprocedure Note    Patient: Jenna Espinoza  MRN: [id number]  YOB: 1960  Date of evaluation: 3/7/2023      Procedure Summary     Date: 03/07/23 Room / Location: [facility] MAIN OR 02 / E MAIN OR    Anesthesia Start: 0902 Anesthesia Stop: 1682    Procedure: LAPAROSCOPIC CHOLECYSTECTOMY; TAJ-CUT LIVER BIOPSIES (Abdomen) Diagnosis:       Calculus of gallbladder with cholecystitis without biliary obstruction, unspecified cholecystitis acuity      Morbid obesity with BMI of 40.0-44.9, adult (HCC)      Chronic anticoagulation      Protein C deficiency ([address]      (Calculus of gallbladder with cholecystitis without biliary obstruction, unspecified cholecystitis acuity [K80.10])      (Morbid obesity with BMI of 40.0-44.9, adult ([address] [E66.01, Z68.41])      (Chronic anticoagulation [Z79.01])      (Protein C deficiency ([address] [D68.59])    Surgeons: Jeannine Kohler MD Responsible Provider: Arnaldo Murry MD    Anesthesia Type: general ASA Status: 3          Anesthesia Type: No value filed.     Tanya Phase I: Tanya Score: 10    Tanya Phase II: Tanya Score: 10      Anesthesia Post Evaluation    Patient location during evaluation: PACU  Patient participation: complete - patient participated  Level of consciousness: awake and alert  Airway patency: patent  Nausea & Vomiting: no nausea and no vomiting  Complications: no  Cardiovascular status: hemodynamically stable  Respiratory status: acceptable, nonlabored ventilation and spontaneous ventilation  Hydration status: euvolemic  Comments: /66   Pulse 62   Temp 97.3 °F (36.3 °C) (Tympanic)   Resp 16   Ht 5' 3\" (1.6 m)   Wt 247 lb 11.2 oz (112.4 kg)   SpO2 95%   BMI 43.88 kg/m²     Multimodal analgesia pain management approach

## 2023-03-15 NOTE — PROGRESS NOTES
poDate: 3/15/2023      Name: Andi Garcia      MRN: [id number]       : 1960       Age: 60 y.o. Sex: male        Minor Credit, MD       CC:  No chief complaint on file. HPI:  The patient presents for the first post-op visit s/p laparoscopic cholecystectomy with Tomer-cut liver biopsies done on 3/7/23. The pathology showed:    Date Obtained:   3/7/2023   DIAGNOSIS        A:  \"GALLBLADDER\":  CHOLELITHIASIS AND CHRONIC CHOLECYSTITIS. B:  \"LIVER, NEEDLE BIOPSY\":  LIVER HAVING MARKED FATTY METAMORPHOSIS   WITHOUT SIGNIFICANT INFLAMMATION OR INCREASE IN PERIPORTAL DENSE FIBROUS   CONNECTIVE TISSUE. Sign Out Date: 3/8/2023  DES Balderrama M.D. Microscopic Description        A:  Microscopic examination reveals gallbladder with focal chronic   inflammation. Diverticula are also noted. Malignant tumor is not   identified. Deeper cuts have similar findings. Dr. Rogelio johnson        B:  Microscopic examination reveals liver consistent with needle   biopsy having marked fatty metamorphosis without significant inflammation. Mckayla's hyaline is not identified. Portal triads have no significant   inflammation. Special stains for iron have no significant increase in   iron stores. Reticulin stains have only 1-2 cells between sinusoids. Trichrome stains have no significant increase in periportal dense fibrous   connective tissue. PAS stains are histologically unremarkable. Deeper   cuts have similar findings. Dr. Rogelio johnson      3/16/23: Patient was \"sore\" after surgery. No nausea or vomiting. Physical Exam:     There were no vitals taken for this visit. General: Alert, oriented, obese white male in no acute distress. Neck: Supple, trachea midline, no appreciable thyromegaly  Resp: Breathing is  non-labored. Lungs clear to auscultation without wheezing or rhonchi   CV: RRR. No murmurs, rubs or gallops appreciated.   Abd: soft, incisional tenderness, wounds intact without signs of infection. Staples in place.    Assessment/Plan:  Tejinder De La Fuente is a 62 y.o. male who is s/p laparoscopic cholecystectomy with Tomer-cut liver biopsies done on 3/7/23.    1. Remove half of the staples.    2. Light activities.    3. Follow-up in one week to remove remaining nixon.    OMID SCHERER MD  [facility]   3/15/2023  9:52 AM

## 2023-03-16 ENCOUNTER — OFFICE VISIT (OUTPATIENT)
Dept: SURGERY | Age: 63
End: 2023-03-16

## 2023-03-16 DIAGNOSIS — E66.01 MORBID OBESITY WITH BMI OF 40.0-44.9, ADULT (HCC): ICD-10-CM

## 2023-03-16 DIAGNOSIS — K80.12 CALCULUS OF GALLBLADDER WITH ACUTE ON CHRONIC CHOLECYSTITIS WITHOUT OBSTRUCTION: Primary | ICD-10-CM

## 2023-03-16 DIAGNOSIS — D68.59 PROTEIN C DEFICIENCY (HCC): ICD-10-CM

## 2023-03-16 PROCEDURE — 99024 POSTOP FOLLOW-UP VISIT: CPT | Performed by: SURGERY

## 2023-03-23 ENCOUNTER — OFFICE VISIT (OUTPATIENT)
Dept: SURGERY | Age: 63
End: 2023-03-23

## 2023-03-23 DIAGNOSIS — Z79.01 CHRONIC ANTICOAGULATION: ICD-10-CM

## 2023-03-23 DIAGNOSIS — K80.12 CALCULUS OF GALLBLADDER WITH ACUTE ON CHRONIC CHOLECYSTITIS WITHOUT OBSTRUCTION: Primary | ICD-10-CM

## 2023-03-23 DIAGNOSIS — E66.01 MORBID OBESITY WITH BMI OF 40.0-44.9, ADULT (HCC): ICD-10-CM

## 2023-03-23 DIAGNOSIS — D68.59 PROTEIN C DEFICIENCY (HCC): ICD-10-CM

## 2023-03-23 PROCEDURE — 99024 POSTOP FOLLOW-UP VISIT: CPT | Performed by: SURGERY

## 2023-03-23 NOTE — PROGRESS NOTES
soft, incisional tenderness, wounds intact without signs of infection. Staples in place. Assessment/Plan:  Taylor Weber is a 60 y.o. male who is s/p laparoscopic cholecystectomy with Tomer-cut liver biopsies done on 3/7/23. 1. Remove all of the staples. 2. Light activities until 4/3/23.     3. Follow-up gi Luna MD  [facility]   3/23/2023  8:30 AM

## 2023-05-05 ENCOUNTER — OFFICE VISIT (OUTPATIENT)
Dept: ENDOCRINOLOGY | Age: 63
End: 2023-05-05
Payer: COMMERCIAL

## 2023-05-05 VITALS
WEIGHT: 250 LBS | DIASTOLIC BLOOD PRESSURE: 81 MMHG | SYSTOLIC BLOOD PRESSURE: 132 MMHG | HEART RATE: 75 BPM | BODY MASS INDEX: 44.29 KG/M2 | OXYGEN SATURATION: 95 %

## 2023-05-05 DIAGNOSIS — Z78.9 STATIN INTOLERANCE: ICD-10-CM

## 2023-05-05 DIAGNOSIS — E03.9 PRIMARY HYPOTHYROIDISM: ICD-10-CM

## 2023-05-05 DIAGNOSIS — I10 PRIMARY HYPERTENSION: ICD-10-CM

## 2023-05-05 DIAGNOSIS — I25.10 CORONARY ARTERY DISEASE INVOLVING NATIVE CORONARY ARTERY OF NATIVE HEART WITHOUT ANGINA PECTORIS: ICD-10-CM

## 2023-05-05 DIAGNOSIS — E66.9 OBESITY (BMI 30-39.9): ICD-10-CM

## 2023-05-05 DIAGNOSIS — E03.9 PRIMARY HYPOTHYROIDISM: Primary | ICD-10-CM

## 2023-05-05 DIAGNOSIS — E09.9 STEROID-INDUCED DIABETES MELLITUS, SUBSEQUENT ENCOUNTER (HCC): ICD-10-CM

## 2023-05-05 DIAGNOSIS — T38.0X5D STEROID-INDUCED DIABETES MELLITUS, SUBSEQUENT ENCOUNTER (HCC): ICD-10-CM

## 2023-05-05 LAB
ALBUMIN SERPL-MCNC: 3.9 G/DL (ref 3.2–4.6)
ALBUMIN/GLOB SERPL: 1.3 (ref 0.4–1.6)
ALP SERPL-CCNC: 51 U/L (ref 50–136)
ALT SERPL-CCNC: 57 U/L (ref 12–65)
ANION GAP SERPL CALC-SCNC: 5 MMOL/L (ref 2–11)
AST SERPL-CCNC: 19 U/L (ref 15–37)
BASOPHILS # BLD: 0.1 K/UL (ref 0–0.2)
BASOPHILS NFR BLD: 1 % (ref 0–2)
BILIRUB SERPL-MCNC: 0.3 MG/DL (ref 0.2–1.1)
BUN SERPL-MCNC: 14 MG/DL (ref 8–23)
CALCIUM SERPL-MCNC: 10.3 MG/DL (ref 8.3–10.4)
CHLORIDE SERPL-SCNC: 108 MMOL/L (ref 101–110)
CO2 SERPL-SCNC: 27 MMOL/L (ref 21–32)
CREAT SERPL-MCNC: 0.7 MG/DL (ref 0.8–1.5)
CREAT UR-MCNC: 43 MG/DL
DIFFERENTIAL METHOD BLD: ABNORMAL
EOSINOPHIL # BLD: 0.1 K/UL (ref 0–0.8)
EOSINOPHIL NFR BLD: 1 % (ref 0.5–7.8)
ERYTHROCYTE [DISTWIDTH] IN BLOOD BY AUTOMATED COUNT: 14.1 % (ref 11.9–14.6)
GLOBULIN SER CALC-MCNC: 3 G/DL (ref 2.8–4.5)
GLUCOSE SERPL-MCNC: 102 MG/DL (ref 65–100)
HBA1C MFR BLD: 5.9 %
HCT VFR BLD AUTO: 46.5 % (ref 41.1–50.3)
HGB BLD-MCNC: 14.6 G/DL (ref 13.6–17.2)
IMM GRANULOCYTES # BLD AUTO: 0.1 K/UL (ref 0–0.5)
IMM GRANULOCYTES NFR BLD AUTO: 1 % (ref 0–5)
LYMPHOCYTES # BLD: 0.5 K/UL (ref 0.5–4.6)
LYMPHOCYTES NFR BLD: 6 % (ref 13–44)
MCH RBC QN AUTO: 33 PG (ref 26.1–32.9)
MCHC RBC AUTO-ENTMCNC: 31.4 G/DL (ref 31.4–35)
MCV RBC AUTO: 105 FL (ref 82–102)
MICROALBUMIN UR-MCNC: 1.81 MG/DL
MICROALBUMIN/CREAT UR-RTO: 42 MG/G (ref 0–30)
MONOCYTES # BLD: 0.7 K/UL (ref 0.1–1.3)
MONOCYTES NFR BLD: 8 % (ref 4–12)
NEUTS SEG # BLD: 6.9 K/UL (ref 1.7–8.2)
NEUTS SEG NFR BLD: 83 % (ref 43–78)
NRBC # BLD: 0 K/UL (ref 0–0.2)
PLATELET # BLD AUTO: 279 K/UL (ref 150–450)
PMV BLD AUTO: 9.3 FL (ref 9.4–12.3)
POTASSIUM SERPL-SCNC: 4.4 MMOL/L (ref 3.5–5.1)
PROT SERPL-MCNC: 6.9 G/DL (ref 6.3–8.2)
RBC # BLD AUTO: 4.43 M/UL (ref 4.23–5.6)
SODIUM SERPL-SCNC: 140 MMOL/L (ref 133–143)
TSH W FREE THYROID IF ABNORMAL: 0.55 UIU/ML (ref 0.36–3.74)
WBC # BLD AUTO: 8.4 K/UL (ref 4.3–11.1)

## 2023-05-05 PROCEDURE — 83036 HEMOGLOBIN GLYCOSYLATED A1C: CPT | Performed by: PHYSICIAN ASSISTANT

## 2023-05-05 PROCEDURE — 99214 OFFICE O/P EST MOD 30 MIN: CPT | Performed by: PHYSICIAN ASSISTANT

## 2023-05-05 PROCEDURE — 3075F SYST BP GE 130 - 139MM HG: CPT | Performed by: PHYSICIAN ASSISTANT

## 2023-05-05 PROCEDURE — 3079F DIAST BP 80-89 MM HG: CPT | Performed by: PHYSICIAN ASSISTANT

## 2023-05-05 RX ORDER — DULAGLUTIDE 3 MG/.5ML
3 INJECTION, SOLUTION SUBCUTANEOUS WEEKLY
Qty: 12 ADJUSTABLE DOSE PRE-FILLED PEN SYRINGE | Refills: 1 | Status: SHIPPED | OUTPATIENT
Start: 2023-05-05

## 2023-05-05 RX ORDER — EMPAGLIFLOZIN 25 MG/1
25 TABLET, FILM COATED ORAL DAILY
Qty: 90 TABLET | Refills: 3 | Status: SHIPPED | OUTPATIENT
Start: 2023-05-05

## 2023-05-05 ASSESSMENT — ENCOUNTER SYMPTOMS
DIARRHEA: 1
NAUSEA: 0
VOMITING: 0
CONSTIPATION: 0
TROUBLE SWALLOWING: 0
VOICE CHANGE: 0

## 2023-05-05 NOTE — PROGRESS NOTES
[facility] ENDOCRINOLOGY   AND   THYROID NODULE CLINIC    Angela Diamond PA-C  [facility] Endocrinology and Thyroid Nodule Clinic  [address], [address]  [place]  Phone [phone]  Facsimile [phone]          Roel Braxton is a 60 y.o. male seen 5/5/2023 for follow up evaluation of primary hypothryoidism and steroid induced diabetes         Assessment and Plan:      1. Primary hypothyroidism  Labs today. Patient is taking 200 mcg of levothyroxine. Has previously had more stable labs when taking namebrand Unithroid, I would recommend patient continue to utilize namebrand Unithroid over generic for this bioactive hormone replacement. Update labs today  - TSH with Reflex; Future    2. 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 mellitus, subsequent encounter ([facility]  Control is stable on current treatment regimen. Patient is interested in reducing metformin dose which is appropriate. Given history of fatty liver disease I believe agents that are weight neutral or encouraged weight loss are most appropriate. This metformin from 1 g twice daily to 500 mg twice daily. We will simultaneously increase Jardiance from 10 mg to 25 mg. Continue 3 mg Trulicity at this time  - AMB POC HEMOGLOBIN A1C  - JARDIANCE 25 MG tablet; Take 1 tablet by mouth daily  Dispense: 90 tablet; Refill: 3  - metFORMIN (GLUCOPHAGE) 500 MG tablet; TAKE 1 TABLET TWICE A DAY WITH MEALS  Dispense: 180 tablet; Refill: 1  - TSH with Reflex; Future  - Comprehensive Metabolic Panel; Future  - Microalbumin / Creatinine Urine Ratio; Future  - CBC with Auto Differential; Future  - TRULICITY 3 TG/8.0KI SOPN; Inject 3 mg into the skin once a week  Dispense: 12 Adjustable Dose Pre-filled Pen Syringe; Refill: 1  -  DIABETES FOOT EXAM    3. Primary hypertension  BP Readings from Last 3 Encounters:   05/05/23 132/81   03/07/23 137/66   02/16/23 127/82       - Microalbumin / Creatinine Urine Ratio; Future    4.  Obesity (BMI

## 2023-05-15 SDOH — ECONOMIC STABILITY: INCOME INSECURITY: HOW HARD IS IT FOR YOU TO PAY FOR THE VERY BASICS LIKE FOOD, HOUSING, MEDICAL CARE, AND HEATING?: PATIENT DECLINED

## 2023-05-15 SDOH — ECONOMIC STABILITY: HOUSING INSECURITY
IN THE LAST 12 MONTHS, WAS THERE A TIME WHEN YOU DID NOT HAVE A STEADY PLACE TO SLEEP OR SLEPT IN A SHELTER (INCLUDING NOW)?: PATIENT REFUSED

## 2023-05-15 SDOH — ECONOMIC STABILITY: TRANSPORTATION INSECURITY
IN THE PAST 12 MONTHS, HAS LACK OF TRANSPORTATION KEPT YOU FROM MEETINGS, WORK, OR FROM GETTING THINGS NEEDED FOR DAILY LIVING?: PATIENT DECLINED

## 2023-05-15 SDOH — ECONOMIC STABILITY: FOOD INSECURITY: WITHIN THE PAST 12 MONTHS, THE FOOD YOU BOUGHT JUST DIDN'T LAST AND YOU DIDN'T HAVE MONEY TO GET MORE.: PATIENT DECLINED

## 2023-05-15 SDOH — ECONOMIC STABILITY: FOOD INSECURITY: WITHIN THE PAST 12 MONTHS, YOU WORRIED THAT YOUR FOOD WOULD RUN OUT BEFORE YOU GOT MONEY TO BUY MORE.: PATIENT DECLINED

## 2023-05-16 ENCOUNTER — OFFICE VISIT (OUTPATIENT)
Dept: INTERNAL MEDICINE CLINIC | Facility: CLINIC | Age: 63
End: 2023-05-16
Payer: COMMERCIAL

## 2023-05-16 VITALS
DIASTOLIC BLOOD PRESSURE: 76 MMHG | OXYGEN SATURATION: 96 % | TEMPERATURE: 97.3 F | SYSTOLIC BLOOD PRESSURE: 131 MMHG | WEIGHT: 246 LBS | HEIGHT: 63 IN | BODY MASS INDEX: 43.59 KG/M2 | HEART RATE: 71 BPM

## 2023-05-16 DIAGNOSIS — F34.1 DYSTHYMIC DISORDER: ICD-10-CM

## 2023-05-16 DIAGNOSIS — Z79.01 CHRONIC ANTICOAGULATION: ICD-10-CM

## 2023-05-16 DIAGNOSIS — E11.9 TYPE 2 DIABETES MELLITUS WITHOUT COMPLICATION, WITHOUT LONG-TERM CURRENT USE OF INSULIN (HCC): ICD-10-CM

## 2023-05-16 DIAGNOSIS — E78.49 OTHER HYPERLIPIDEMIA: ICD-10-CM

## 2023-05-16 DIAGNOSIS — D68.59 PROTEIN C DEFICIENCY (HCC): ICD-10-CM

## 2023-05-16 DIAGNOSIS — M33.20 POLYMYOSITIS (HCC): Primary | ICD-10-CM

## 2023-05-16 DIAGNOSIS — E03.9 ACQUIRED HYPOTHYROIDISM: ICD-10-CM

## 2023-05-16 PROCEDURE — 3075F SYST BP GE 130 - 139MM HG: CPT | Performed by: INTERNAL MEDICINE

## 2023-05-16 PROCEDURE — 3078F DIAST BP <80 MM HG: CPT | Performed by: INTERNAL MEDICINE

## 2023-05-16 PROCEDURE — 99214 OFFICE O/P EST MOD 30 MIN: CPT | Performed by: INTERNAL MEDICINE

## 2023-05-16 RX ORDER — METHYLPREDNISOLONE 4 MG/1
4 TABLET ORAL
Qty: 90 TABLET | Refills: 1 | Status: SHIPPED | OUTPATIENT
Start: 2023-05-16

## 2023-05-16 RX ORDER — DULOXETIN HYDROCHLORIDE 30 MG/1
CAPSULE, DELAYED RELEASE ORAL
Qty: 90 CAPSULE | Refills: 1 | Status: SHIPPED | OUTPATIENT
Start: 2023-05-16

## 2023-05-16 RX ORDER — AZATHIOPRINE 50 MG/1
TABLET ORAL
Qty: 450 TABLET | Refills: 1 | Status: SHIPPED | OUTPATIENT
Start: 2023-05-16

## 2023-05-16 ASSESSMENT — ANXIETY QUESTIONNAIRES
5. BEING SO RESTLESS THAT IT IS HARD TO SIT STILL: 0
7. FEELING AFRAID AS IF SOMETHING AWFUL MIGHT HAPPEN: 0
3. WORRYING TOO MUCH ABOUT DIFFERENT THINGS: 0
6. BECOMING EASILY ANNOYED OR IRRITABLE: 0
GAD7 TOTAL SCORE: 0
IF YOU CHECKED OFF ANY PROBLEMS ON THIS QUESTIONNAIRE, HOW DIFFICULT HAVE THESE PROBLEMS MADE IT FOR YOU TO DO YOUR WORK, TAKE CARE OF THINGS AT HOME, OR GET ALONG WITH OTHER PEOPLE: NOT DIFFICULT AT ALL
4. TROUBLE RELAXING: 0
2. NOT BEING ABLE TO STOP OR CONTROL WORRYING: 0
1. FEELING NERVOUS, ANXIOUS, OR ON EDGE: 0

## 2023-05-16 ASSESSMENT — PATIENT HEALTH QUESTIONNAIRE - PHQ9
8. MOVING OR SPEAKING SO SLOWLY THAT OTHER PEOPLE COULD HAVE NOTICED. OR THE OPPOSITE, BEING SO FIGETY OR RESTLESS THAT YOU HAVE BEEN MOVING AROUND A LOT MORE THAN USUAL: 0
SUM OF ALL RESPONSES TO PHQ QUESTIONS 1-9: 0
2. FEELING DOWN, DEPRESSED OR HOPELESS: 0
6. FEELING BAD ABOUT YOURSELF - OR THAT YOU ARE A FAILURE OR HAVE LET YOURSELF OR YOUR FAMILY DOWN: 0
1. LITTLE INTEREST OR PLEASURE IN DOING THINGS: 0
SUM OF ALL RESPONSES TO PHQ QUESTIONS 1-9: 0
5. POOR APPETITE OR OVEREATING: 0
SUM OF ALL RESPONSES TO PHQ QUESTIONS 1-9: 0
7. TROUBLE CONCENTRATING ON THINGS, SUCH AS READING THE NEWSPAPER OR WATCHING TELEVISION: 0
SUM OF ALL RESPONSES TO PHQ9 QUESTIONS 1 & 2: 0
4. FEELING TIRED OR HAVING LITTLE ENERGY: 0
3. TROUBLE FALLING OR STAYING ASLEEP: 0
SUM OF ALL RESPONSES TO PHQ QUESTIONS 1-9: 0
10. IF YOU CHECKED OFF ANY PROBLEMS, HOW DIFFICULT HAVE THESE PROBLEMS MADE IT FOR YOU TO DO YOUR WORK, TAKE CARE OF THINGS AT HOME, OR GET ALONG WITH OTHER PEOPLE: 0
9. THOUGHTS THAT YOU WOULD BE BETTER OFF DEAD, OR OF HURTING YOURSELF: 0

## 2023-05-16 NOTE — PROGRESS NOTES
Yehuda Tillman M.D. Internal Medicine  [address] , [address]   Office : [phone]  Fax : [phone]    Chief Complaint   Patient presents with    Diabetes     4 mo follow up       History of Present Illness:  Dima Carney is a 60 y.o. male. HPI    Diabetes Mellitus  Patient presents  for follow up on diabetes. Onset of symptoms was several years ago. Patient describes symptoms as none. Course to date has been well controlled. Diet and Lifestyle: follows a diabetic diet regularly  exercises sporadically  nonsmoker  Home glucose monitoring:  Results average nl. Patient denies polyuria and polydipsia. No wounds in lower limbs. Most recent HbA1c was   Hemoglobin A1C   Date Value Ref Range Status   09/16/2022 6.3 (H) 4.8 - 5.6 % Final     Hemoglobin A1C, POC   Date Value Ref Range Status   05/05/2023 5.9 % Final       Hypertension  Patient is in for Hypertension which is stable. Diet and Lifestyle: generally follows a low sodium diet  Home BP Monitoring: is not measured at home. Patient's recent blood pressures were   BP Readings from Last 3 Encounters:   05/16/23 131/76   05/05/23 132/81   03/07/23 137/66   . taking medications as instructed, no medication side effects noted, no TIA's, no chest pain on exertion, no dyspnea on exertion, no swelling of ankles. Cardiac risk factors consist of advanced age (older than 54 for men, 72 for women), diabetes mellitus, dyslipidemia, hypertension, and male gender. Lab Results   Component Value Date/Time     05/05/2023 10:35 AM    K 4.4 05/05/2023 10:35 AM     05/05/2023 10:35 AM    CO2 27 05/05/2023 10:35 AM    BUN 14 05/05/2023 10:35 AM    CREATININE 0.70 05/05/2023 10:35 AM    GLUCOSE 102 05/05/2023 10:35 AM    CALCIUM 10.3 05/05/2023 10:35 AM    LABGLOM >60 05/05/2023 10:35 AM        Hyperlipidemia  Patient is in for follow-up for hyperlipidemia.   Diet and Lifestyle:

## 2023-06-19 ENCOUNTER — PATIENT MESSAGE (OUTPATIENT)
Dept: ENDOCRINOLOGY | Age: 63
End: 2023-06-19

## 2023-06-19 ENCOUNTER — TELEPHONE (OUTPATIENT)
Age: 63
End: 2023-06-19

## 2023-06-19 NOTE — TELEPHONE ENCOUNTER
Pt states spoke to PCP at his last OV, states he has been having some tightness in his chest with stress. PCP recommended f/u with cardiology for stress test. Denies active chest pain, states discomfort is mild, not associated with SOB, dizziness. Appt scheduled with Dr. Merwyn Spatz on 6/30 at 10:00 in [facility]. Verb understanding.

## 2023-06-19 NOTE — TELEPHONE ENCOUNTER
Spoke to pt stated he's had high readings since last appt 5/5/23:    2 Metformin  in AM+ 2 in PM w/ 10mg of Jardiance     Now: 2 Metformin in PM w/ 25mg Jardiance in AM and Truclity weekly. Reading today 145 ~8:30AM  Have not taken reading again     Not changes in health. Encouraged pt to keep log of BG reading. Would like provider insight.

## 2023-06-19 NOTE — TELEPHONE ENCOUNTER
From: Marjorie Gonzalez  To: Batsheva Rolon  Sent: 6/19/2023 10:41 AM EDT  Subject: Glucose readings    Since changing my med dosage, my readings have been higher, 125 - 150. What should I do?

## 2023-06-19 NOTE — TELEPHONE ENCOUNTER
Pt wonders if he needs to have a stress test performed. States he experiences a tightness in his chest when he feels stressed out. States the tightness is not \"gripping\" but does not feel right. Denies SOB, nausea and dizziness. States this chest tightness when stressed has been going on for about \"a month or so. \"

## 2023-06-20 RX ORDER — METFORMIN HYDROCHLORIDE 750 MG/1
750 TABLET, EXTENDED RELEASE ORAL
Qty: 60 TABLET | Refills: 5 | Status: SHIPPED | OUTPATIENT
Start: 2023-06-20

## 2023-06-20 NOTE — TELEPHONE ENCOUNTER
Paula response:        I sent metformin XR 750mg to take twice daily INSTEAD of your current metformin dose

## 2023-06-30 ENCOUNTER — OFFICE VISIT (OUTPATIENT)
Age: 63
End: 2023-06-30

## 2023-06-30 VITALS
HEART RATE: 78 BPM | DIASTOLIC BLOOD PRESSURE: 62 MMHG | BODY MASS INDEX: 42.98 KG/M2 | WEIGHT: 242.6 LBS | HEIGHT: 63 IN | SYSTOLIC BLOOD PRESSURE: 118 MMHG

## 2023-06-30 DIAGNOSIS — I25.118 CORONARY ARTERY DISEASE OF NATIVE ARTERY OF NATIVE HEART WITH STABLE ANGINA PECTORIS (HCC): ICD-10-CM

## 2023-06-30 DIAGNOSIS — I20.9 ANGINA PECTORIS (HCC): ICD-10-CM

## 2023-06-30 DIAGNOSIS — R07.89 CHEST DISCOMFORT: Primary | ICD-10-CM

## 2023-06-30 RX ORDER — METOPROLOL SUCCINATE 50 MG/1
50 TABLET, EXTENDED RELEASE ORAL DAILY
Qty: 90 TABLET | Refills: 3 | Status: SHIPPED | OUTPATIENT
Start: 2023-06-30

## 2023-06-30 RX ORDER — NITROGLYCERIN 0.4 MG/1
0.4 TABLET SUBLINGUAL EVERY 5 MIN PRN
Qty: 25 TABLET | Refills: 3 | Status: SHIPPED | OUTPATIENT
Start: 2023-06-30

## 2023-06-30 ASSESSMENT — ENCOUNTER SYMPTOMS: SHORTNESS OF BREATH: 0

## 2023-08-07 ENCOUNTER — OFFICE VISIT (OUTPATIENT)
Age: 63
End: 2023-08-07
Payer: COMMERCIAL

## 2023-08-07 VITALS
DIASTOLIC BLOOD PRESSURE: 56 MMHG | SYSTOLIC BLOOD PRESSURE: 102 MMHG | OXYGEN SATURATION: 96 % | RESPIRATION RATE: 16 BRPM | HEART RATE: 71 BPM | BODY MASS INDEX: 43.62 KG/M2 | WEIGHT: 246.2 LBS | HEIGHT: 63 IN

## 2023-08-07 DIAGNOSIS — Z78.9 STATIN INTOLERANCE: ICD-10-CM

## 2023-08-07 DIAGNOSIS — I25.10 CORONARY ARTERY DISEASE INVOLVING NATIVE CORONARY ARTERY OF NATIVE HEART WITHOUT ANGINA PECTORIS: ICD-10-CM

## 2023-08-07 DIAGNOSIS — E78.5 DYSLIPIDEMIA: ICD-10-CM

## 2023-08-07 DIAGNOSIS — R07.89 CHEST DISCOMFORT: Primary | ICD-10-CM

## 2023-08-07 DIAGNOSIS — I10 ESSENTIAL HYPERTENSION: ICD-10-CM

## 2023-08-07 DIAGNOSIS — E11.9 TYPE 2 DIABETES MELLITUS WITHOUT COMPLICATION, WITHOUT LONG-TERM CURRENT USE OF INSULIN (HCC): ICD-10-CM

## 2023-08-07 LAB
CHOLEST SERPL-MCNC: 102 MG/DL
HDLC SERPL-MCNC: 54 MG/DL (ref 40–60)
HDLC SERPL: 1.9
LDLC SERPL CALC-MCNC: 12.4 MG/DL
TRIGL SERPL-MCNC: 178 MG/DL (ref 35–150)
VLDLC SERPL CALC-MCNC: 35.6 MG/DL (ref 6–23)

## 2023-08-07 PROCEDURE — 3074F SYST BP LT 130 MM HG: CPT | Performed by: INTERNAL MEDICINE

## 2023-08-07 PROCEDURE — 99214 OFFICE O/P EST MOD 30 MIN: CPT | Performed by: INTERNAL MEDICINE

## 2023-08-07 PROCEDURE — 3078F DIAST BP <80 MM HG: CPT | Performed by: INTERNAL MEDICINE

## 2023-08-07 NOTE — PATIENT INSTRUCTIONS
Patient Education        Learning About the Mediterranean Diet  What is the [address]? The Mediterranean diet is a style of eating rather than a diet plan. It features foods eaten in [facility], [place], [place], and other countries along the [facility]. It emphasizes eating foods like fish, fruits, vegetables, beans, high-fiber breads and whole grains, nuts, and olive oil. This style of eating includes limited red meat, cheese, and sweets. Why choose the Mediterranean diet? A Mediterranean-style diet may improve heart health. It contains more fat than other heart-healthy diets. But the fats are mainly from nuts, unsaturated oils (such as fish oils and olive oil), and certain nut or seed oils (such as canola, soybean, or flaxseed oil). These fats may help protect the heart and blood vessels. How can you get started on the Mediterranean diet? Here are some things you can do to switch to a more Mediterranean way of eating. What to eat  Eat a variety of fruits and vegetables each day, such as grapes, blueberries, tomatoes, broccoli, peppers, figs, olives, spinach, eggplant, beans, lentils, and chickpeas. Eat a variety of whole-grain foods each day, such as oats, brown rice, and whole wheat bread, pasta, and couscous. Eat fish at least 2 times a week. Try tuna, salmon, mackerel, lake trout, herring, or sardines. Eat moderate amounts of low-fat dairy products, such as milk, cheese, or yogurt. Eat moderate amounts of poultry and eggs. Choose healthy (unsaturated) fats, such as nuts, olive oil, and certain nut or seed oils like canola, soybean, and flaxseed. Limit unhealthy (saturated) fats, such as butter, palm oil, and coconut oil. And limit fats found in animal products, such as meat and dairy products made with whole milk. Try to eat red meat only a few times a month in very small amounts. Limit sweets and desserts to only a few times a week.  This includes sugar-sweetened

## 2023-08-07 NOTE — PROGRESS NOTES
[facility] CARDIOLOGY  [address] [address]  PHONE: [phone]      23    NAME:  Kipp Bence  : 1960  MRN: [id number]         SUBJECTIVE:   Kipp Bence is a 60 y.o. male seen for a follow up visit regarding the following:     Chief Complaint   Patient presents with    Results     Nuclear stress test    Coronary Artery Disease    Hyperlipidemia    Hypertension            HPI:  Follow up  Results (Nuclear stress test), Coronary Artery Disease, Hyperlipidemia, and Hypertension   . Follow up CAD, hypertension, dyslipidemia, statin intolerance (polymyositis),  chest discomfort, and anxiety his perfusion study was normal.   He has felt better since last visit without further chest discomfort. Thinks the higher dose BB helped. Needs updated lipid panel          Past cardiac history:   2018 - perfusion study with diaphragm artifact   Echo - EF 57%, indeterminate DF, no valve disease. chronic appearing partial clot in the popliteal artery, no DVT. - Xarelto. Coronary  calcium score 1056, but at cath all extraluminal calcium with patent arteries. Not on statin therapy due to  dx polymyositis with CK > 20,000. He was approved for Repatha therapy   Mar 2020- diaphragm and apical artifact, no ischemia, normal EF. 6 minutes. Resting /80. 2023       Nst - normal perfusion and EF              [facility] CHF Meds            metoprolol succinate (TOPROL XL) 50 MG extended release tablet (Taking)    Sig - Route: Take 1 tablet by mouth daily - Oral    amLODIPine (NORVASC) 5 MG tablet (Taking)    Sig: TAKE 1 TABLET DAILY    ezetimibe (ZETIA) 10 MG tablet (Taking)    Sig: TAKE 1 TABLET DAILY    Evolocumab (REPATHA SURECLICK) 058 MG/ML SOAJ (Taking)    Sig - Route: Inject 140 mg into the skin every 14 days - SubCUTAneous    losartan (COZAAR) 100 MG tablet (Taking)    Sig - Route:  Take 1 tablet by mouth daily TAKE 1 TABLET DAILY - Oral

## 2023-08-08 ENCOUNTER — TELEPHONE (OUTPATIENT)
Age: 63
End: 2023-08-08

## 2023-08-08 NOTE — TELEPHONE ENCOUNTER
Patient notified of test results and physician's recommendations for Repatha frequency adjustment. Patient voiced understanding.

## 2023-08-08 NOTE — TELEPHONE ENCOUNTER
----- Message from Ed Bateman MD sent at 8/8/2023  7:50 AM EDT -----  Please notify patient lipids are definitely at goal (triglyceride up a little but non fasting sample and better than previous). So good, in fact, that I recommend he reduce the Repatha to just once a month, which will also save him some money.

## 2023-08-08 NOTE — RESULT ENCOUNTER NOTE
Please notify patient lipids are definitely at goal (triglyceride up a little but non fasting sample and better than previous). So good, in fact, that I recommend he reduce the Repatha to just once a month, which will also save him some money.

## 2023-09-17 ASSESSMENT — PATIENT HEALTH QUESTIONNAIRE - PHQ9
4. FEELING TIRED OR HAVING LITTLE ENERGY: SEVERAL DAYS
5. POOR APPETITE OR OVEREATING: 0
SUM OF ALL RESPONSES TO PHQ QUESTIONS 1-9: 1
1. LITTLE INTEREST OR PLEASURE IN DOING THINGS: NOT AT ALL
7. TROUBLE CONCENTRATING ON THINGS, SUCH AS READING THE NEWSPAPER OR WATCHING TELEVISION: 0
SUM OF ALL RESPONSES TO PHQ QUESTIONS 1-9: 1
10. IF YOU CHECKED OFF ANY PROBLEMS, HOW DIFFICULT HAVE THESE PROBLEMS MADE IT FOR YOU TO DO YOUR WORK, TAKE CARE OF THINGS AT HOME, OR GET ALONG WITH OTHER PEOPLE: NOT DIFFICULT AT ALL
2. FEELING DOWN, DEPRESSED OR HOPELESS: NOT AT ALL
8. MOVING OR SPEAKING SO SLOWLY THAT OTHER PEOPLE COULD HAVE NOTICED. OR THE OPPOSITE - BEING SO FIDGETY OR RESTLESS THAT YOU HAVE BEEN MOVING AROUND A LOT MORE THAN USUAL: NOT AT ALL
3. TROUBLE FALLING OR STAYING ASLEEP: 0
SUM OF ALL RESPONSES TO PHQ QUESTIONS 1-9: 1
3. TROUBLE FALLING OR STAYING ASLEEP: NOT AT ALL
4. FEELING TIRED OR HAVING LITTLE ENERGY: 1
7. TROUBLE CONCENTRATING ON THINGS, SUCH AS READING THE NEWSPAPER OR WATCHING TELEVISION: NOT AT ALL
6. FEELING BAD ABOUT YOURSELF - OR THAT YOU ARE A FAILURE OR HAVE LET YOURSELF OR YOUR FAMILY DOWN: NOT AT ALL
2. FEELING DOWN, DEPRESSED OR HOPELESS: 0
6. FEELING BAD ABOUT YOURSELF - OR THAT YOU ARE A FAILURE OR HAVE LET YOURSELF OR YOUR FAMILY DOWN: 0
9. THOUGHTS THAT YOU WOULD BE BETTER OFF DEAD, OR OF HURTING YOURSELF: 0
SUM OF ALL RESPONSES TO PHQ9 QUESTIONS 1 & 2: 0
5. POOR APPETITE OR OVEREATING: NOT AT ALL
10. IF YOU CHECKED OFF ANY PROBLEMS, HOW DIFFICULT HAVE THESE PROBLEMS MADE IT FOR YOU TO DO YOUR WORK, TAKE CARE OF THINGS AT HOME, OR GET ALONG WITH OTHER PEOPLE: 0
1. LITTLE INTEREST OR PLEASURE IN DOING THINGS: 0
8. MOVING OR SPEAKING SO SLOWLY THAT OTHER PEOPLE COULD HAVE NOTICED. OR THE OPPOSITE, BEING SO FIGETY OR RESTLESS THAT YOU HAVE BEEN MOVING AROUND A LOT MORE THAN USUAL: 0
9. THOUGHTS THAT YOU WOULD BE BETTER OFF DEAD, OR OF HURTING YOURSELF: NOT AT ALL

## 2023-09-20 ENCOUNTER — OFFICE VISIT (OUTPATIENT)
Dept: INTERNAL MEDICINE CLINIC | Facility: CLINIC | Age: 63
End: 2023-09-20
Payer: COMMERCIAL

## 2023-09-20 VITALS
DIASTOLIC BLOOD PRESSURE: 70 MMHG | HEIGHT: 63 IN | TEMPERATURE: 97.2 F | OXYGEN SATURATION: 95 % | HEART RATE: 90 BPM | BODY MASS INDEX: 43.77 KG/M2 | WEIGHT: 247 LBS | SYSTOLIC BLOOD PRESSURE: 125 MMHG

## 2023-09-20 DIAGNOSIS — Z79.01 CHRONIC ANTICOAGULATION: Primary | ICD-10-CM

## 2023-09-20 DIAGNOSIS — M33.20 POLYMYOSITIS (HCC): ICD-10-CM

## 2023-09-20 DIAGNOSIS — Z23 ENCOUNTER FOR ADMINISTRATION OF VACCINE: ICD-10-CM

## 2023-09-20 DIAGNOSIS — E11.9 TYPE 2 DIABETES MELLITUS WITHOUT COMPLICATION, WITHOUT LONG-TERM CURRENT USE OF INSULIN (HCC): ICD-10-CM

## 2023-09-20 DIAGNOSIS — E78.49 OTHER HYPERLIPIDEMIA: ICD-10-CM

## 2023-09-20 DIAGNOSIS — D68.59 PROTEIN C DEFICIENCY (HCC): ICD-10-CM

## 2023-09-20 DIAGNOSIS — F34.1 DYSTHYMIC DISORDER: ICD-10-CM

## 2023-09-20 DIAGNOSIS — E03.9 ACQUIRED HYPOTHYROIDISM: ICD-10-CM

## 2023-09-20 DIAGNOSIS — I10 ESSENTIAL HYPERTENSION: ICD-10-CM

## 2023-09-20 PROCEDURE — 90674 CCIIV4 VAC NO PRSV 0.5 ML IM: CPT | Performed by: INTERNAL MEDICINE

## 2023-09-20 PROCEDURE — 90471 IMMUNIZATION ADMIN: CPT | Performed by: INTERNAL MEDICINE

## 2023-09-20 PROCEDURE — 3078F DIAST BP <80 MM HG: CPT | Performed by: INTERNAL MEDICINE

## 2023-09-20 PROCEDURE — 3074F SYST BP LT 130 MM HG: CPT | Performed by: INTERNAL MEDICINE

## 2023-09-20 PROCEDURE — 99214 OFFICE O/P EST MOD 30 MIN: CPT | Performed by: INTERNAL MEDICINE

## 2023-09-20 RX ORDER — RIVAROXABAN 10 MG/1
10 TABLET, FILM COATED ORAL
Qty: 90 TABLET | Refills: 1 | Status: SHIPPED | OUTPATIENT
Start: 2023-09-20

## 2023-09-20 RX ORDER — MONTELUKAST SODIUM 4 MG/1
1 TABLET, CHEWABLE ORAL DAILY
COMMUNITY
Start: 2023-09-14

## 2023-09-20 RX ORDER — DULOXETIN HYDROCHLORIDE 30 MG/1
CAPSULE, DELAYED RELEASE ORAL
Qty: 90 CAPSULE | Refills: 1 | Status: SHIPPED | OUTPATIENT
Start: 2023-09-20

## 2023-09-20 RX ORDER — METHYLPREDNISOLONE 4 MG/1
4 TABLET ORAL
Qty: 90 TABLET | Refills: 1 | Status: SHIPPED | OUTPATIENT
Start: 2023-09-20

## 2023-09-20 RX ORDER — LEVOTHYROXINE SODIUM 0.2 MG/1
200 TABLET ORAL
Qty: 90 TABLET | Refills: 1 | Status: SHIPPED | OUTPATIENT
Start: 2023-09-20

## 2023-09-20 RX ORDER — AZATHIOPRINE 50 MG/1
TABLET ORAL
Qty: 450 TABLET | Refills: 1 | Status: SHIPPED | OUTPATIENT
Start: 2023-09-20

## 2023-09-20 SDOH — ECONOMIC STABILITY: FOOD INSECURITY: WITHIN THE PAST 12 MONTHS, THE FOOD YOU BOUGHT JUST DIDN'T LAST AND YOU DIDN'T HAVE MONEY TO GET MORE.: NEVER TRUE

## 2023-09-20 SDOH — ECONOMIC STABILITY: FOOD INSECURITY: WITHIN THE PAST 12 MONTHS, YOU WORRIED THAT YOUR FOOD WOULD RUN OUT BEFORE YOU GOT MONEY TO BUY MORE.: NEVER TRUE

## 2023-09-20 SDOH — ECONOMIC STABILITY: HOUSING INSECURITY
IN THE LAST 12 MONTHS, WAS THERE A TIME WHEN YOU DID NOT HAVE A STEADY PLACE TO SLEEP OR SLEPT IN A SHELTER (INCLUDING NOW)?: NO

## 2023-09-20 SDOH — ECONOMIC STABILITY: INCOME INSECURITY: HOW HARD IS IT FOR YOU TO PAY FOR THE VERY BASICS LIKE FOOD, HOUSING, MEDICAL CARE, AND HEATING?: NOT HARD AT ALL

## 2023-09-20 ASSESSMENT — ANXIETY QUESTIONNAIRES
7. FEELING AFRAID AS IF SOMETHING AWFUL MIGHT HAPPEN: 0
2. NOT BEING ABLE TO STOP OR CONTROL WORRYING: 0
GAD7 TOTAL SCORE: 0
6. BECOMING EASILY ANNOYED OR IRRITABLE: 0
IF YOU CHECKED OFF ANY PROBLEMS ON THIS QUESTIONNAIRE, HOW DIFFICULT HAVE THESE PROBLEMS MADE IT FOR YOU TO DO YOUR WORK, TAKE CARE OF THINGS AT HOME, OR GET ALONG WITH OTHER PEOPLE: NOT DIFFICULT AT ALL
5. BEING SO RESTLESS THAT IT IS HARD TO SIT STILL: 0
3. WORRYING TOO MUCH ABOUT DIFFERENT THINGS: 0
4. TROUBLE RELAXING: 0
1. FEELING NERVOUS, ANXIOUS, OR ON EDGE: 0

## 2023-09-20 ASSESSMENT — ENCOUNTER SYMPTOMS: SHORTNESS OF BREATH: 0

## 2023-09-20 NOTE — PROGRESS NOTES
Lucita Sinclair M.D. Internal Medicine  [facility], [address] [place] [address]  Office : [phone]  Fax : [phone]    Chief Complaint   Patient presents with    Diabetes     4 mo follow up       History of Present Illness:  Sil Oleary is a 60 y.o. male. Diabetes  Pertinent negatives for hypoglycemia include no speech difficulty. Pertinent negatives for diabetes include no chest pain and no fatigue. Diabetes Mellitus  Patient presents  for follow up on diabetes. Onset of symptoms was several years ago. Patient describes symptoms as none. Course to date has been well controlled. Diet and Lifestyle: follows a diabetic diet regularly  exercises sporadically  nonsmoker  Home glucose monitoring:  Results average n/a. Patient denies polyuria and polydipsia. No wounds in lower limbs. Most recent HbA1c was   Hemoglobin A1C   Date Value Ref Range Status   09/16/2022 6.3 (H) 4.8 - 5.6 % Final     Hemoglobin A1C, POC   Date Value Ref Range Status   05/05/2023 5.9 % Final       Hypertension  Patient is in for Hypertension which is stable. Diet and Lifestyle: generally follows a low sodium diet  Home BP Monitoring: is not measured at home. Patient's recent blood pressures were   BP Readings from Last 3 Encounters:   09/20/23 125/70   08/07/23 (!) 102/56   07/26/23 130/80   . taking medications as instructed, no medication side effects noted, no TIA's, no chest pain on exertion, no dyspnea on exertion, no swelling of ankles. Cardiac risk factors consist of dyslipidemia and hypertension.   Lab Results   Component Value Date/Time     05/05/2023 10:35 AM    K 4.4 05/05/2023 10:35 AM     05/05/2023 10:35 AM    CO2 27 05/05/2023 10:35 AM    BUN 14 05/05/2023 10:35 AM    CREATININE 0.70 05/05/2023 10:35 AM    GLUCOSE 102 05/05/2023 10:35 AM    CALCIUM 10.3 05/05/2023 10:35 AM    LABGLOM >60 05/05/2023 10:35 AM

## 2023-10-12 RX ORDER — EVOLOCUMAB 140 MG/ML
140 INJECTION, SOLUTION SUBCUTANEOUS
Qty: 1 ADJUSTABLE DOSE PRE-FILLED PEN SYRINGE | Refills: 5 | Status: SHIPPED | OUTPATIENT
Start: 2023-10-12

## 2023-11-26 ENCOUNTER — CLINICAL DOCUMENTATION (OUTPATIENT)
Age: 63
End: 2023-11-26

## 2023-11-26 RX ORDER — EZETIMIBE 10 MG/1
TABLET ORAL
Qty: 90 TABLET | Refills: 3 | Status: SHIPPED | OUTPATIENT
Start: 2023-11-26

## 2023-12-04 RX ORDER — NITROGLYCERIN 0.4 MG/1
TABLET SUBLINGUAL
Qty: 75 TABLET | Refills: 1 | Status: SHIPPED | OUTPATIENT
Start: 2023-12-04

## 2023-12-08 NOTE — PROGRESS NOTES
[address] [facility]. [address]  [phone]    PATIENT ID    Mr. Garrett Pfeiffer  1960  His primary provider is Chris Dominguez MD    CC: Mr. Loree Dalton returns to the clinic today for follow up of his obstructive sleep apnea. INTERVAL HISTORY  Mr. Loree Dalton is here for follow-up obstructive sleep apnea. Mr. Loree Dalton was originally diagnosed with severe obstructive sleep apnea in 2004 with an AHI of 84 and a low SPO2 of 79%. He has past medical history notable for hypertension/hyperlipidemia, coronary artery disease, hypothyroidism, polymyositis on maintenance steroids, history of DVT/protein C deficiency, type 2 diabetes mellitus, obesity with a BMI of 39, anxiety. He continues to use the nasal pillows without any tenderness or skin irritation, his sleep is restorative and he denies any morning headaches daytime sleepiness insomnia or other sleep-related complaints. He does note his machine is giving him the error message that the motor has exceeded its life and is asking for a new machine today. Since last seen no significant health changes, he does have a new twin grandchildren and lately his wife has had significant health issues with complications of intra cranial hypertension and  shunt, and currently hospitalized. He did get evaluated for chest pain by cardiology but was felt to not have had an acute coronary syndrome earlier this year. He was last seen by me in 12/22 and was compliant and getting good clinical benefits from CPAP therapy. Since implementing CPAP therapy Mr. Loree Dalton feels more rested and less fatigued. On CPAP download review today he is utilizing his CPAP machine 100% of the time, greater than 4 hours per night, for a median daily usage of 8 hours 15 minutes per night. His AHI is down to less than 1 and his leak rate is acceptable.   Past medical and surgical histories, medications and allergies, problem list, and social

## 2023-12-11 RX ORDER — EVOLOCUMAB 140 MG/ML
140 INJECTION, SOLUTION SUBCUTANEOUS
Qty: 2 ADJUSTABLE DOSE PRE-FILLED PEN SYRINGE | Refills: 11 | Status: SHIPPED | OUTPATIENT
Start: 2023-12-11

## 2023-12-11 NOTE — TELEPHONE ENCOUNTER
Requested Prescriptions     Pending Prescriptions Disp Refills    Evolocumab (REPATHA SURECLICK) 252 MG/ML SOAJ 2 Adjustable Dose Pre-filled Pen Syringe 11     Sig: Inject 140 mg into the skin every 30 days

## 2023-12-12 ENCOUNTER — OFFICE VISIT (OUTPATIENT)
Dept: SLEEP MEDICINE | Age: 63
End: 2023-12-12
Payer: COMMERCIAL

## 2023-12-12 VITALS
DIASTOLIC BLOOD PRESSURE: 56 MMHG | HEART RATE: 77 BPM | RESPIRATION RATE: 16 BRPM | HEIGHT: 63 IN | WEIGHT: 244.2 LBS | SYSTOLIC BLOOD PRESSURE: 120 MMHG | OXYGEN SATURATION: 96 % | BODY MASS INDEX: 43.27 KG/M2 | TEMPERATURE: 97.8 F

## 2023-12-12 DIAGNOSIS — G47.33 OSA (OBSTRUCTIVE SLEEP APNEA): Primary | ICD-10-CM

## 2023-12-12 PROCEDURE — 3078F DIAST BP <80 MM HG: CPT | Performed by: INTERNAL MEDICINE

## 2023-12-12 PROCEDURE — 99213 OFFICE O/P EST LOW 20 MIN: CPT | Performed by: INTERNAL MEDICINE

## 2023-12-12 PROCEDURE — 3074F SYST BP LT 130 MM HG: CPT | Performed by: INTERNAL MEDICINE

## 2023-12-12 ASSESSMENT — SLEEP AND FATIGUE QUESTIONNAIRES
HOW LIKELY ARE YOU TO NOD OFF OR FALL ASLEEP WHILE SITTING AND TALKING TO SOMEONE: 0
HOW LIKELY ARE YOU TO NOD OFF OR FALL ASLEEP WHEN YOU ARE A PASSENGER IN A CAR FOR AN HOUR WITHOUT A BREAK: 0
ESS TOTAL SCORE: 5
HOW LIKELY ARE YOU TO NOD OFF OR FALL ASLEEP WHILE SITTING AND READING: 1
HOW LIKELY ARE YOU TO NOD OFF OR FALL ASLEEP WHILE SITTING INACTIVE IN A PUBLIC PLACE: 1
HOW LIKELY ARE YOU TO NOD OFF OR FALL ASLEEP WHILE WATCHING TV: 1
HOW LIKELY ARE YOU TO NOD OFF OR FALL ASLEEP IN A CAR, WHILE STOPPED FOR A FEW MINUTES IN TRAFFIC: 0
HOW LIKELY ARE YOU TO NOD OFF OR FALL ASLEEP WHILE LYING DOWN TO REST IN THE AFTERNOON WHEN CIRCUMSTANCES PERMIT: 2
HOW LIKELY ARE YOU TO NOD OFF OR FALL ASLEEP WHILE SITTING QUIETLY AFTER LUNCH WITHOUT ALCOHOL: 0

## 2023-12-12 NOTE — ASSESSMENT & PLAN NOTE
Patient has very severe obstructive sleep apnea with an AHI of 84, has been using CPAP faithfully but his machine has exceeded its motor life and we will order a new machine today, broadening his pressures to auto CPAP 13-16 cm H2O as he is so well-controlled. We will see him back in 3 to 4 months for a face-to-face compliance visit. Supplies order sent to [address] [place] [address] [place].

## 2023-12-29 DIAGNOSIS — I10 ESSENTIAL HYPERTENSION: ICD-10-CM

## 2023-12-29 RX ORDER — LOSARTAN POTASSIUM 100 MG/1
100 TABLET ORAL DAILY
Qty: 90 TABLET | Refills: 3 | Status: SHIPPED | OUTPATIENT
Start: 2023-12-29

## 2024-01-08 RX ORDER — AMLODIPINE BESYLATE 5 MG/1
TABLET ORAL
Qty: 90 TABLET | Refills: 3 | Status: SHIPPED | OUTPATIENT
Start: 2024-01-08

## 2024-01-08 NOTE — TELEPHONE ENCOUNTER
Requested Prescriptions     Pending Prescriptions Disp Refills    amLODIPine (NORVASC) 5 MG tablet [Pharmacy Med Name: AMLODIPINE BESYLATE TABS 5MG] 90 tablet 3     Sig: TAKE 1 TABLET DAILY     Rx verified

## 2024-01-23 ENCOUNTER — OFFICE VISIT (OUTPATIENT)
Dept: INTERNAL MEDICINE CLINIC | Facility: CLINIC | Age: 64
End: 2024-01-23
Payer: COMMERCIAL

## 2024-01-23 VITALS
BODY MASS INDEX: 43.41 KG/M2 | SYSTOLIC BLOOD PRESSURE: 138 MMHG | TEMPERATURE: 97.9 F | HEART RATE: 65 BPM | HEIGHT: 63 IN | WEIGHT: 245 LBS | OXYGEN SATURATION: 96 % | DIASTOLIC BLOOD PRESSURE: 79 MMHG

## 2024-01-23 DIAGNOSIS — Z00.00 ANNUAL PHYSICAL EXAM: Primary | ICD-10-CM

## 2024-01-23 DIAGNOSIS — E11.9 TYPE 2 DIABETES MELLITUS WITHOUT COMPLICATION, WITHOUT LONG-TERM CURRENT USE OF INSULIN (HCC): ICD-10-CM

## 2024-01-23 DIAGNOSIS — F34.1 DYSTHYMIC DISORDER: ICD-10-CM

## 2024-01-23 DIAGNOSIS — M33.20 POLYMYOSITIS (HCC): ICD-10-CM

## 2024-01-23 DIAGNOSIS — E09.9 STEROID-INDUCED DIABETES MELLITUS, SUBSEQUENT ENCOUNTER (HCC): ICD-10-CM

## 2024-01-23 DIAGNOSIS — D68.59 PROTEIN C DEFICIENCY (HCC): ICD-10-CM

## 2024-01-23 DIAGNOSIS — E66.01 OBESITY, CLASS III, BMI 40-49.9 (MORBID OBESITY) (HCC): ICD-10-CM

## 2024-01-23 DIAGNOSIS — T38.0X5D STEROID-INDUCED DIABETES MELLITUS, SUBSEQUENT ENCOUNTER (HCC): ICD-10-CM

## 2024-01-23 DIAGNOSIS — E78.49 OTHER HYPERLIPIDEMIA: ICD-10-CM

## 2024-01-23 DIAGNOSIS — I10 ESSENTIAL HYPERTENSION: ICD-10-CM

## 2024-01-23 DIAGNOSIS — Z00.00 ANNUAL PHYSICAL EXAM: ICD-10-CM

## 2024-01-23 DIAGNOSIS — I20.9 ANGINA PECTORIS (HCC): ICD-10-CM

## 2024-01-23 DIAGNOSIS — Z79.01 CHRONIC ANTICOAGULATION: ICD-10-CM

## 2024-01-23 DIAGNOSIS — E03.9 ACQUIRED HYPOTHYROIDISM: ICD-10-CM

## 2024-01-23 LAB
ALBUMIN SERPL-MCNC: 3.9 G/DL (ref 3.2–4.6)
ALBUMIN/GLOB SERPL: 1.2 (ref 0.4–1.6)
ALP SERPL-CCNC: 56 U/L (ref 50–136)
ALT SERPL-CCNC: 44 U/L (ref 12–65)
ANION GAP SERPL CALC-SCNC: 6 MMOL/L (ref 2–11)
APPEARANCE UR: CLEAR
AST SERPL-CCNC: 16 U/L (ref 15–37)
BACTERIA URNS QL MICRO: NEGATIVE /HPF
BASOPHILS # BLD: 0.1 K/UL (ref 0–0.2)
BASOPHILS NFR BLD: 1 % (ref 0–2)
BILIRUB DIRECT SERPL-MCNC: 0.1 MG/DL
BILIRUB SERPL-MCNC: 0.3 MG/DL (ref 0.2–1.1)
BILIRUB UR QL: NEGATIVE
BUN SERPL-MCNC: 19 MG/DL (ref 8–23)
CALCIUM SERPL-MCNC: 10 MG/DL (ref 8.3–10.4)
CASTS URNS QL MICRO: ABNORMAL /LPF (ref 0–2)
CHLORIDE SERPL-SCNC: 105 MMOL/L (ref 103–113)
CHOLEST SERPL-MCNC: 136 MG/DL
CO2 SERPL-SCNC: 28 MMOL/L (ref 21–32)
COLOR UR: ABNORMAL
CREAT SERPL-MCNC: 0.9 MG/DL (ref 0.8–1.5)
CREAT UR-MCNC: 89 MG/DL
CRP SERPL-MCNC: <0.3 MG/DL (ref 0–0.9)
DIFFERENTIAL METHOD BLD: ABNORMAL
EOSINOPHIL # BLD: 0.3 K/UL (ref 0–0.8)
EOSINOPHIL NFR BLD: 4 % (ref 0.5–7.8)
EPI CELLS #/AREA URNS HPF: ABNORMAL /HPF (ref 0–5)
ERYTHROCYTE [DISTWIDTH] IN BLOOD BY AUTOMATED COUNT: 13.6 % (ref 11.9–14.6)
EST. AVERAGE GLUCOSE BLD GHB EST-MCNC: 123 MG/DL
GLOBULIN SER CALC-MCNC: 3.3 G/DL (ref 2.8–4.5)
GLUCOSE SERPL-MCNC: 103 MG/DL (ref 65–100)
GLUCOSE UR STRIP.AUTO-MCNC: >1000 MG/DL
HBA1C MFR BLD: 5.9 % (ref 4.8–5.6)
HCT VFR BLD AUTO: 47.5 % (ref 41.1–50.3)
HDLC SERPL-MCNC: 53 MG/DL (ref 40–60)
HDLC SERPL: 2.6
HGB BLD-MCNC: 15.1 G/DL (ref 13.6–17.2)
HGB UR QL STRIP: ABNORMAL
IMM GRANULOCYTES # BLD AUTO: 0 K/UL (ref 0–0.5)
IMM GRANULOCYTES NFR BLD AUTO: 1 % (ref 0–5)
KETONES UR QL STRIP.AUTO: ABNORMAL MG/DL
LDLC SERPL CALC-MCNC: 49.4 MG/DL
LEUKOCYTE ESTERASE UR QL STRIP.AUTO: NEGATIVE
LYMPHOCYTES # BLD: 0.7 K/UL (ref 0.5–4.6)
LYMPHOCYTES NFR BLD: 10 % (ref 13–44)
MCH RBC QN AUTO: 31.5 PG (ref 26.1–32.9)
MCHC RBC AUTO-ENTMCNC: 31.8 G/DL (ref 31.4–35)
MCV RBC AUTO: 99.2 FL (ref 82–102)
MICROALBUMIN UR-MCNC: 2.47 MG/DL
MICROALBUMIN/CREAT UR-RTO: 28 MG/G (ref 0–30)
MONOCYTES # BLD: 0.7 K/UL (ref 0.1–1.3)
MONOCYTES NFR BLD: 10 % (ref 4–12)
NEUTS SEG # BLD: 5 K/UL (ref 1.7–8.2)
NEUTS SEG NFR BLD: 74 % (ref 43–78)
NITRITE UR QL STRIP.AUTO: NEGATIVE
NRBC # BLD: 0 K/UL (ref 0–0.2)
PH UR STRIP: 5.5 (ref 5–9)
PLATELET # BLD AUTO: 253 K/UL (ref 150–450)
PMV BLD AUTO: 9.4 FL (ref 9.4–12.3)
POTASSIUM SERPL-SCNC: 4.1 MMOL/L (ref 3.5–5.1)
PROT SERPL-MCNC: 7.2 G/DL (ref 6.3–8.2)
PROT UR STRIP-MCNC: NEGATIVE MG/DL
PSA SERPL-MCNC: 1.1 NG/ML
RBC # BLD AUTO: 4.79 M/UL (ref 4.23–5.6)
RBC #/AREA URNS HPF: ABNORMAL /HPF (ref 0–5)
SODIUM SERPL-SCNC: 139 MMOL/L (ref 136–146)
SP GR UR REFRACTOMETRY: 1.03 (ref 1–1.02)
TRIGL SERPL-MCNC: 168 MG/DL (ref 35–150)
TSH, 3RD GENERATION: 0.43 UIU/ML (ref 0.36–3.74)
UROBILINOGEN UR QL STRIP.AUTO: 0.2 EU/DL (ref 0.2–1)
VLDLC SERPL CALC-MCNC: 33.6 MG/DL (ref 6–23)
WBC # BLD AUTO: 6.7 K/UL (ref 4.3–11.1)
WBC URNS QL MICRO: ABNORMAL /HPF (ref 0–4)

## 2024-01-23 PROCEDURE — 3078F DIAST BP <80 MM HG: CPT | Performed by: INTERNAL MEDICINE

## 2024-01-23 PROCEDURE — 99396 PREV VISIT EST AGE 40-64: CPT | Performed by: INTERNAL MEDICINE

## 2024-01-23 PROCEDURE — 3075F SYST BP GE 130 - 139MM HG: CPT | Performed by: INTERNAL MEDICINE

## 2024-01-23 RX ORDER — DULOXETIN HYDROCHLORIDE 30 MG/1
CAPSULE, DELAYED RELEASE ORAL
Qty: 90 CAPSULE | Refills: 1 | Status: SHIPPED | OUTPATIENT
Start: 2024-01-23

## 2024-01-23 RX ORDER — LEVOTHYROXINE SODIUM 0.2 MG/1
200 TABLET ORAL
Qty: 90 TABLET | Refills: 1 | Status: SHIPPED | OUTPATIENT
Start: 2024-01-23

## 2024-01-23 RX ORDER — METHYLPREDNISOLONE 4 MG/1
4 TABLET ORAL
Qty: 90 TABLET | Refills: 1 | Status: SHIPPED | OUTPATIENT
Start: 2024-01-23

## 2024-01-23 RX ORDER — RIVAROXABAN 10 MG/1
10 TABLET, FILM COATED ORAL
Qty: 90 TABLET | Refills: 1 | Status: SHIPPED | OUTPATIENT
Start: 2024-01-23

## 2024-01-23 RX ORDER — AZATHIOPRINE 50 MG/1
TABLET ORAL
Qty: 450 TABLET | Refills: 1 | Status: SHIPPED | OUTPATIENT
Start: 2024-01-23

## 2024-01-23 SDOH — ECONOMIC STABILITY: FOOD INSECURITY: WITHIN THE PAST 12 MONTHS, YOU WORRIED THAT YOUR FOOD WOULD RUN OUT BEFORE YOU GOT MONEY TO BUY MORE.: NEVER TRUE

## 2024-01-23 SDOH — ECONOMIC STABILITY: INCOME INSECURITY: HOW HARD IS IT FOR YOU TO PAY FOR THE VERY BASICS LIKE FOOD, HOUSING, MEDICAL CARE, AND HEATING?: NOT HARD AT ALL

## 2024-01-23 SDOH — ECONOMIC STABILITY: FOOD INSECURITY: WITHIN THE PAST 12 MONTHS, THE FOOD YOU BOUGHT JUST DIDN'T LAST AND YOU DIDN'T HAVE MONEY TO GET MORE.: NEVER TRUE

## 2024-01-23 ASSESSMENT — ANXIETY QUESTIONNAIRES
6. BECOMING EASILY ANNOYED OR IRRITABLE: 0
1. FEELING NERVOUS, ANXIOUS, OR ON EDGE: 0
IF YOU CHECKED OFF ANY PROBLEMS ON THIS QUESTIONNAIRE, HOW DIFFICULT HAVE THESE PROBLEMS MADE IT FOR YOU TO DO YOUR WORK, TAKE CARE OF THINGS AT HOME, OR GET ALONG WITH OTHER PEOPLE: NOT DIFFICULT AT ALL
GAD7 TOTAL SCORE: 0
7. FEELING AFRAID AS IF SOMETHING AWFUL MIGHT HAPPEN: 0
3. WORRYING TOO MUCH ABOUT DIFFERENT THINGS: 0
5. BEING SO RESTLESS THAT IT IS HARD TO SIT STILL: 0
4. TROUBLE RELAXING: 0
2. NOT BEING ABLE TO STOP OR CONTROL WORRYING: 0

## 2024-01-23 ASSESSMENT — PATIENT HEALTH QUESTIONNAIRE - PHQ9
8. MOVING OR SPEAKING SO SLOWLY THAT OTHER PEOPLE COULD HAVE NOTICED. OR THE OPPOSITE, BEING SO FIGETY OR RESTLESS THAT YOU HAVE BEEN MOVING AROUND A LOT MORE THAN USUAL: 0
7. TROUBLE CONCENTRATING ON THINGS, SUCH AS READING THE NEWSPAPER OR WATCHING TELEVISION: 0
6. FEELING BAD ABOUT YOURSELF - OR THAT YOU ARE A FAILURE OR HAVE LET YOURSELF OR YOUR FAMILY DOWN: 0
2. FEELING DOWN, DEPRESSED OR HOPELESS: 0
9. THOUGHTS THAT YOU WOULD BE BETTER OFF DEAD, OR OF HURTING YOURSELF: 0
SUM OF ALL RESPONSES TO PHQ QUESTIONS 1-9: 0
3. TROUBLE FALLING OR STAYING ASLEEP: 0
SUM OF ALL RESPONSES TO PHQ QUESTIONS 1-9: 0
10. IF YOU CHECKED OFF ANY PROBLEMS, HOW DIFFICULT HAVE THESE PROBLEMS MADE IT FOR YOU TO DO YOUR WORK, TAKE CARE OF THINGS AT HOME, OR GET ALONG WITH OTHER PEOPLE: 0
SUM OF ALL RESPONSES TO PHQ9 QUESTIONS 1 & 2: 0
SUM OF ALL RESPONSES TO PHQ QUESTIONS 1-9: 0
1. LITTLE INTEREST OR PLEASURE IN DOING THINGS: 0
4. FEELING TIRED OR HAVING LITTLE ENERGY: 0
SUM OF ALL RESPONSES TO PHQ QUESTIONS 1-9: 0
5. POOR APPETITE OR OVEREATING: 0

## 2024-01-23 NOTE — PROGRESS NOTES
Evolocumab (REPATHA SURECLICK) 140 MG/ML SOAJ Inject 140 mg into the skin every 30 days 2 Adjustable Dose Pre-filled Pen Syringe 11    nitroGLYCERIN (NITROSTAT) 0.4 MG SL tablet PLACE 1 TABLET UNDER TONGUE EVERY 5 MINS, UP TO 3 DOSES AS NEEDED FOR CHEST PAIN 75 tablet 1    ezetimibe (ZETIA) 10 MG tablet TAKE 1 TABLET DAILY 90 tablet 3    colestipol (COLESTID) 1 g tablet Take 1 tablet by mouth daily      metoprolol succinate (TOPROL XL) 50 MG extended release tablet Take 1 tablet by mouth daily 90 tablet 3    JARDIANCE 25 MG tablet Take 1 tablet by mouth daily 90 tablet 3    Coenzyme Q10 (COQ10 PO) Take by mouth      aspirin 81 MG EC tablet Take 1 tablet by mouth      hydroCHLOROthiazide (HYDRODIURIL) 12.5 MG tablet Take 1 tablet by mouth daily TAKE 1 TABLET DAILY 90 tablet 3    ciclopirox (LOPROX) 0.77 % cream Apply topically 2 times daily      fluticasone (CUTIVATE) 0.05 % cream Apply topically daily      Multiple Vitamins-Minerals (MULTIVITAMIN ADULTS PO) Take by mouth      fexofenadine (ALLEGRA) 180 MG tablet Take 1 tablet by mouth daily       No current facility-administered medications for this visit.     Social History:  Social History     Tobacco Use    Smoking status: Former     Current packs/day: 0.00     Average packs/day: 1 pack/day for 15.0 years (15.0 ttl pk-yrs)     Types: Cigarettes     Start date: 1991     Quit date: 2006     Years since quittin.7    Smokeless tobacco: Former     Quit date: 2006   Substance Use Topics    Alcohol use: No     Family History  Family History   Problem Relation Age of Onset    Kidney Disease Son         secondary to congenital vessel disease    Kidney Disease Paternal Uncle     Diabetes Paternal Uncle     Heart Surgery Paternal Uncle         in 70s    Heart Attack Paternal Uncle     Heart Surgery Paternal Aunt     Alzheimer's Disease Father     Other Mother         \"valve issue\"    Heart Surgery Father         mid to late 60s    Hypertension Brother

## 2024-01-25 DIAGNOSIS — Z00.00 ANNUAL PHYSICAL EXAM: Primary | ICD-10-CM

## 2024-01-29 DIAGNOSIS — I10 ESSENTIAL HYPERTENSION: ICD-10-CM

## 2024-01-29 RX ORDER — HYDROCHLOROTHIAZIDE 12.5 MG/1
12.5 TABLET ORAL DAILY
Qty: 90 TABLET | Refills: 3 | Status: SHIPPED | OUTPATIENT
Start: 2024-01-29

## 2024-02-06 NOTE — PROGRESS NOTES
[facility] CARDIOLOGY  [address] [place] [address], [address]  [place] [address]  PHONE: [phone]      24    NAME:  Tejinder De La Fuente  : 1960  MRN: [id number]         SUBJECTIVE:   Tejinder De La Fuente is a 63 y.o. male seen for a follow up visit regarding the following:     Chief Complaint   Patient presents with    Hypertension    Coronary Artery Disease            HPI:  Follow up  Hypertension and Coronary Artery Disease   .    Follow up CAD, hypertension, dyslipidemia, statin intolerance (polymyositis),  chest discomfort, and anxiety .  Active with his 5 grandkids (8 month old twins) .  No angina.  Lipids at goal, glycemic control is good, works on healthy diet.           Past cardiac history:   2018 - perfusion study with diaphragm artifact   Echo - EF 57%, indeterminate DF, no valve disease.     chronic appearing partial clot in the popliteal artery, no DVT. - Xarelto.     Coronary  calcium score 1056, but at cath all extraluminal calcium with patent arteries.     Not on statin therapy due to  dx polymyositis with CK > 20,000.  He was approved for Repatha therapy   Mar 2020- diaphragm and apical artifact, no ischemia, normal EF. 6 minutes.  Resting /80.   2023       NST - normal perfusion and EF                  [facility] CAD CHF Meds            hydroCHLOROthiazide 12.5 MG tablet (Taking)    Sig - Route: TAKE 1 TABLET DAILY - Oral    rivaroxaban (XARELTO) 10 MG TABS tablet (Taking)    Sig - Route: Take 1 tablet by mouth daily (with breakfast) - Oral    Prior authorization: Closed - Prior Authorization not required for patient/medication    amLODIPine (NORVASC) 5 MG tablet (Taking)    Sig: TAKE 1 TABLET DAILY    losartan (COZAAR) 100 MG tablet (Taking)    Sig - Route: TAKE 1 TABLET DAILY - Oral    Evolocumab (REPATHA SURECLICK) 140 MG/ML SOAJ (Taking)    Sig - Route: Inject 140 mg into the skin every 30 days - SubCUTAneous    ezetimibe (ZETIA) 10 MG tablet (Taking)    Sig: TAKE 1

## 2024-02-07 ENCOUNTER — OFFICE VISIT (OUTPATIENT)
Age: 64
End: 2024-02-07
Payer: COMMERCIAL

## 2024-02-07 VITALS
SYSTOLIC BLOOD PRESSURE: 128 MMHG | WEIGHT: 242 LBS | DIASTOLIC BLOOD PRESSURE: 78 MMHG | BODY MASS INDEX: 37.98 KG/M2 | HEIGHT: 67 IN | HEART RATE: 68 BPM

## 2024-02-07 DIAGNOSIS — M33.20 POLYMYOSITIS (HCC): ICD-10-CM

## 2024-02-07 DIAGNOSIS — I10 ESSENTIAL HYPERTENSION: Primary | ICD-10-CM

## 2024-02-07 DIAGNOSIS — I20.9 ANGINA PECTORIS (HCC): ICD-10-CM

## 2024-02-07 DIAGNOSIS — E78.5 DYSLIPIDEMIA: ICD-10-CM

## 2024-02-07 DIAGNOSIS — Z78.9 STATIN INTOLERANCE: ICD-10-CM

## 2024-02-07 DIAGNOSIS — I25.118 CORONARY ARTERY DISEASE OF NATIVE ARTERY OF NATIVE HEART WITH STABLE ANGINA PECTORIS (HCC): ICD-10-CM

## 2024-02-07 PROCEDURE — 3074F SYST BP LT 130 MM HG: CPT | Performed by: INTERNAL MEDICINE

## 2024-02-07 PROCEDURE — 99214 OFFICE O/P EST MOD 30 MIN: CPT | Performed by: INTERNAL MEDICINE

## 2024-02-07 PROCEDURE — 3078F DIAST BP <80 MM HG: CPT | Performed by: INTERNAL MEDICINE

## 2024-02-07 ASSESSMENT — ENCOUNTER SYMPTOMS: SHORTNESS OF BREATH: 0

## 2024-03-18 DIAGNOSIS — M33.20 POLYMYOSITIS (HCC): ICD-10-CM

## 2024-03-18 RX ORDER — METHYLPREDNISOLONE 4 MG/1
TABLET ORAL
Qty: 90 TABLET | Refills: 3 | OUTPATIENT
Start: 2024-03-18

## 2024-03-19 ENCOUNTER — PATIENT MESSAGE (OUTPATIENT)
Dept: INTERNAL MEDICINE CLINIC | Facility: CLINIC | Age: 64
End: 2024-03-19

## 2024-03-19 DIAGNOSIS — E11.9 TYPE 2 DIABETES MELLITUS WITHOUT COMPLICATION, WITHOUT LONG-TERM CURRENT USE OF INSULIN (HCC): Primary | ICD-10-CM

## 2024-03-19 RX ORDER — SEMAGLUTIDE 1.34 MG/ML
0.5 INJECTION, SOLUTION SUBCUTANEOUS
Qty: 1.5 ML | Refills: 3 | Status: SHIPPED | OUTPATIENT
Start: 2024-03-19

## 2024-03-19 NOTE — TELEPHONE ENCOUNTER
Zoraida Clemens MA 3/19/2024 4:06 PM EDT    Pt states his insurance covers the Ozempic.  ----- Message -----  From: Tejinder De La Fuente  Sent: 3/19/2024 3:15 PM EDT  To: [facility] Clinical Staff  Subject: Trulicity vs Ozempic     Yes, it covers.

## 2024-04-15 DIAGNOSIS — E09.9 STEROID-INDUCED DIABETES MELLITUS, SUBSEQUENT ENCOUNTER (HCC): ICD-10-CM

## 2024-04-15 DIAGNOSIS — T38.0X5D STEROID-INDUCED DIABETES MELLITUS, SUBSEQUENT ENCOUNTER (HCC): ICD-10-CM

## 2024-04-15 RX ORDER — EMPAGLIFLOZIN 25 MG/1
25 TABLET, FILM COATED ORAL DAILY
Qty: 90 TABLET | Refills: 3 | Status: SHIPPED | OUTPATIENT
Start: 2024-04-15

## 2024-05-06 ENCOUNTER — OFFICE VISIT (OUTPATIENT)
Dept: ENDOCRINOLOGY | Age: 64
End: 2024-05-06
Payer: COMMERCIAL

## 2024-05-06 VITALS
OXYGEN SATURATION: 97 % | BODY MASS INDEX: 37.67 KG/M2 | WEIGHT: 240 LBS | SYSTOLIC BLOOD PRESSURE: 126 MMHG | HEIGHT: 67 IN | DIASTOLIC BLOOD PRESSURE: 76 MMHG | HEART RATE: 76 BPM

## 2024-05-06 DIAGNOSIS — Z78.9 STATIN INTOLERANCE: ICD-10-CM

## 2024-05-06 DIAGNOSIS — K76.0 FATTY LIVER: ICD-10-CM

## 2024-05-06 DIAGNOSIS — I25.10 CORONARY ARTERY DISEASE INVOLVING NATIVE CORONARY ARTERY OF NATIVE HEART WITHOUT ANGINA PECTORIS: ICD-10-CM

## 2024-05-06 DIAGNOSIS — T38.0X5D STEROID-INDUCED DIABETES MELLITUS, SUBSEQUENT ENCOUNTER (HCC): ICD-10-CM

## 2024-05-06 DIAGNOSIS — E09.9 STEROID-INDUCED DIABETES MELLITUS, SUBSEQUENT ENCOUNTER (HCC): ICD-10-CM

## 2024-05-06 DIAGNOSIS — E03.9 PRIMARY HYPOTHYROIDISM: Primary | ICD-10-CM

## 2024-05-06 DIAGNOSIS — I10 PRIMARY HYPERTENSION: ICD-10-CM

## 2024-05-06 DIAGNOSIS — E66.9 OBESITY (BMI 30-39.9): ICD-10-CM

## 2024-05-06 LAB — HBA1C MFR BLD: 5.9 %

## 2024-05-06 PROCEDURE — 3074F SYST BP LT 130 MM HG: CPT | Performed by: PHYSICIAN ASSISTANT

## 2024-05-06 PROCEDURE — 83036 HEMOGLOBIN GLYCOSYLATED A1C: CPT | Performed by: PHYSICIAN ASSISTANT

## 2024-05-06 PROCEDURE — 99214 OFFICE O/P EST MOD 30 MIN: CPT | Performed by: PHYSICIAN ASSISTANT

## 2024-05-06 PROCEDURE — 3078F DIAST BP <80 MM HG: CPT | Performed by: PHYSICIAN ASSISTANT

## 2024-05-06 PROCEDURE — G2211 COMPLEX E/M VISIT ADD ON: HCPCS | Performed by: PHYSICIAN ASSISTANT

## 2024-05-06 RX ORDER — EMPAGLIFLOZIN 25 MG/1
25 TABLET, FILM COATED ORAL DAILY
Qty: 90 TABLET | Refills: 3 | Status: SHIPPED | OUTPATIENT
Start: 2024-05-06

## 2024-05-06 RX ORDER — SEMAGLUTIDE 0.68 MG/ML
0.5 INJECTION, SOLUTION SUBCUTANEOUS
Qty: 9 ML | Refills: 3 | Status: SHIPPED | OUTPATIENT
Start: 2024-05-06

## 2024-05-06 RX ORDER — OMEPRAZOLE 40 MG/1
40 CAPSULE, DELAYED RELEASE ORAL DAILY
COMMUNITY
Start: 2024-03-19

## 2024-05-06 ASSESSMENT — ENCOUNTER SYMPTOMS
DIARRHEA: 1
VOMITING: 0
VOICE CHANGE: 0
CONSTIPATION: 0
NAUSEA: 0
TROUBLE SWALLOWING: 0

## 2024-05-06 NOTE — PROGRESS NOTES
[facility] ENDOCRINOLOGY   AND   THYROID NODULE CLINIC    Linda Olson PA-C  [facility] Endocrinology and Thyroid Nodule Clinic  [address], [address] [address]  Phone [phone]  Facsimile [phone]          Tejinder De La Fuente is a 63 y.o. male seen 5/6/2024 for follow up evaluation of primary hypothryoidism and steroid induced diabetes         Assessment and Plan:      1. Primary hypothyroidism  Patient is taking 200 mcg of generic levothyroxine correctly and is euthyroid.      2. 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 mellitus, subsequent encounter ([facility])  Now on 0.5mg ozempic instead of trulicity. No significant change to SE, weight or glycemic control. Plan to uptitrate to max tolerated dose.    Interested if coming off metformin. Can decrease to once daily and watch for worsening control     - AMB POC HEMOGLOBIN A1C  - JARDIANCE 25 MG tablet; Take 1 tablet by mouth daily  Dispense: 90 tablet; Refill: 3  - OZEMPIC, 0.25 OR 0.5 MG/DOSE, 2 MG/3ML SOPN; Inject 0.5 mg into the skin every 7 days E11.65  Dispense: 9 mL; Refill: 3  - HM DIABETES FOOT EXAM          3. Primary hypertension  stable  BP Readings from Last 3 Encounters:   05/06/24 126/76   02/07/24 128/78   01/23/24 138/79       4. Obesity (BMI 30-39.9)  Patient would certainly benefit from improved glycemic control with agents that are either weight neutral or confer weight loss as a side effect.      5. Coronary artery disease involving native coronary artery of native heart without angina pectoris  Would benefit from agents with cardiovascular endpoint data  - JARDIANCE 25 MG tablet; Take 1 tablet by mouth daily  Dispense: 90 tablet; Refill: 3  - OZEMPIC, 0.25 OR 0.5 MG/DOSE, 2 MG/3ML SOPN; Inject 0.5 mg into the skin every 7 days E11.65  Dispense: 9 mL; Refill: 3    6. Statin intolerance  Severe myalgias. Avoid this class of meds      7. Fatty liver  Pt diagnosed with hepatic steatosis.  Discussed natural history of illness

## 2024-05-21 ENCOUNTER — OFFICE VISIT (OUTPATIENT)
Dept: INTERNAL MEDICINE CLINIC | Facility: CLINIC | Age: 64
End: 2024-05-21
Payer: COMMERCIAL

## 2024-05-21 VITALS
HEART RATE: 61 BPM | TEMPERATURE: 97.3 F | DIASTOLIC BLOOD PRESSURE: 80 MMHG | OXYGEN SATURATION: 95 % | BODY MASS INDEX: 37.86 KG/M2 | SYSTOLIC BLOOD PRESSURE: 130 MMHG | WEIGHT: 241.2 LBS | HEIGHT: 67 IN

## 2024-05-21 DIAGNOSIS — F34.1 DYSTHYMIC DISORDER: ICD-10-CM

## 2024-05-21 DIAGNOSIS — I10 ESSENTIAL HYPERTENSION: ICD-10-CM

## 2024-05-21 DIAGNOSIS — E03.9 ACQUIRED HYPOTHYROIDISM: ICD-10-CM

## 2024-05-21 DIAGNOSIS — I25.10 ASHD (ARTERIOSCLEROTIC HEART DISEASE): ICD-10-CM

## 2024-05-21 DIAGNOSIS — Z79.01 CHRONIC ANTICOAGULATION: ICD-10-CM

## 2024-05-21 DIAGNOSIS — M33.20 POLYMYOSITIS (HCC): ICD-10-CM

## 2024-05-21 DIAGNOSIS — E11.9 TYPE 2 DIABETES MELLITUS WITHOUT COMPLICATION, WITHOUT LONG-TERM CURRENT USE OF INSULIN (HCC): Primary | ICD-10-CM

## 2024-05-21 DIAGNOSIS — E78.49 OTHER HYPERLIPIDEMIA: ICD-10-CM

## 2024-05-21 PROCEDURE — 3078F DIAST BP <80 MM HG: CPT | Performed by: INTERNAL MEDICINE

## 2024-05-21 PROCEDURE — 99214 OFFICE O/P EST MOD 30 MIN: CPT | Performed by: INTERNAL MEDICINE

## 2024-05-21 PROCEDURE — 3044F HG A1C LEVEL LT 7.0%: CPT | Performed by: INTERNAL MEDICINE

## 2024-05-21 PROCEDURE — 3075F SYST BP GE 130 - 139MM HG: CPT | Performed by: INTERNAL MEDICINE

## 2024-05-21 RX ORDER — LEVOTHYROXINE SODIUM 0.2 MG/1
200 TABLET ORAL
Qty: 90 TABLET | Refills: 1 | Status: SHIPPED | OUTPATIENT
Start: 2024-07-21

## 2024-05-21 RX ORDER — AMOXICILLIN AND CLAVULANATE POTASSIUM 875; 125 MG/1; MG/1
1 TABLET, FILM COATED ORAL 2 TIMES DAILY
COMMUNITY
Start: 2024-05-15 | End: 2024-05-22

## 2024-05-21 RX ORDER — LORATADINE 10 MG/1
10 TABLET ORAL DAILY
COMMUNITY
Start: 2024-05-15 | End: 2024-05-29

## 2024-05-21 RX ORDER — METHYLPREDNISOLONE 4 MG/1
4 TABLET ORAL
Qty: 90 TABLET | Refills: 1 | Status: SHIPPED | OUTPATIENT
Start: 2024-07-21

## 2024-05-21 RX ORDER — SODIUM CHLORIDE 0.65 %
2 AEROSOL, SPRAY (ML) NASAL 3 TIMES DAILY PRN
COMMUNITY
Start: 2024-05-15

## 2024-05-21 RX ORDER — DULOXETIN HYDROCHLORIDE 30 MG/1
CAPSULE, DELAYED RELEASE ORAL
Qty: 90 CAPSULE | Refills: 1 | Status: SHIPPED | OUTPATIENT
Start: 2024-07-21

## 2024-05-21 RX ORDER — AZATHIOPRINE 50 MG/1
TABLET ORAL
Qty: 450 TABLET | Refills: 1 | Status: SHIPPED | OUTPATIENT
Start: 2024-07-21

## 2024-05-21 RX ORDER — RIVAROXABAN 10 MG/1
10 TABLET, FILM COATED ORAL
Qty: 90 TABLET | Refills: 1 | Status: SHIPPED | OUTPATIENT
Start: 2024-07-21

## 2024-05-21 ASSESSMENT — ENCOUNTER SYMPTOMS
SHORTNESS OF BREATH: 0
BACK PAIN: 1

## 2024-05-21 NOTE — PROGRESS NOTES
difficulty.   Psychiatric/Behavioral:  Negative for dysphoric mood.          Vital Signs  /80   Pulse 61   Temp 97.3 °F (36.3 °C) (Temporal)   Ht 1.702 m (5' 7\")   Wt 109.4 kg (241 lb 3.2 oz)   SpO2 95%   BMI 37.78 kg/m²   Body mass index is 37.78 kg/m².    Physical Exam  Vitals reviewed.   Constitutional:       General: He is not in acute distress.     Appearance: Normal appearance. He is not ill-appearing.   HENT:      Head: Normocephalic.   Eyes:      General: No scleral icterus.     Conjunctiva/sclera: Conjunctivae normal.   Neck:      Vascular: No carotid bruit.   Cardiovascular:      Rate and Rhythm: Normal rate and regular rhythm.      Heart sounds: Normal heart sounds. No murmur heard.  Pulmonary:      Effort: Pulmonary effort is normal.      Breath sounds: Normal breath sounds.   Musculoskeletal:         General: No swelling.   Skin:     Coloration: Skin is not jaundiced.      Findings: No rash.   Neurological:      General: No focal deficit present.      Mental Status: He is alert. Mental status is at baseline.      Cranial Nerves: No cranial nerve deficit.      Motor: No weakness.      Gait: Gait normal.   Psychiatric:         Mood and Affect: Mood normal.         Behavior: Behavior normal.         Thought Content: Thought content normal.         Judgment: Judgment normal.           Assessment/Plan:  Tejinder was seen today for diabetes.    Diagnoses and all orders for this visit:    Type 2 diabetes mellitus without complication, without long-term current use of insulin ([facility])    Chronic anticoagulation  -     rivaroxaban (XARELTO) 10 MG TABS tablet; Take 1 tablet by mouth daily (with breakfast)    Dysthymic disorder  -     DULoxetine (CYMBALTA) 30 MG extended release capsule; TAKE 1 CAPSULE DAILY    Polymyositis (HCC)  -     azaTHIOprine (IMURAN) 50 MG tablet; Take 3 tablets in the morning and 2 tablets at night for a total dose of 250mg daily.  -     methylPREDNISolone (MEDROL) 4 MG tablet;  Picato Pregnancy And Lactation Text: This medication is Pregnancy Category C. It is unknown if this medication is excreted in breast milk.

## 2024-06-06 RX ORDER — METOPROLOL SUCCINATE 50 MG/1
50 TABLET, EXTENDED RELEASE ORAL DAILY
Qty: 90 TABLET | Refills: 3 | Status: SHIPPED | OUTPATIENT
Start: 2024-06-06

## 2024-06-18 ENCOUNTER — OFFICE VISIT (OUTPATIENT)
Dept: INTERNAL MEDICINE CLINIC | Facility: CLINIC | Age: 64
End: 2024-06-18
Payer: COMMERCIAL

## 2024-06-18 VITALS
TEMPERATURE: 97.5 F | OXYGEN SATURATION: 97 % | SYSTOLIC BLOOD PRESSURE: 151 MMHG | WEIGHT: 238 LBS | BODY MASS INDEX: 37.35 KG/M2 | HEART RATE: 71 BPM | HEIGHT: 67 IN | DIASTOLIC BLOOD PRESSURE: 87 MMHG

## 2024-06-18 DIAGNOSIS — N45.3 ACUTE EPIDIDYMO-ORCHITIS: Primary | ICD-10-CM

## 2024-06-18 DIAGNOSIS — M54.50 ACUTE RIGHT-SIDED LOW BACK PAIN WITHOUT SCIATICA: ICD-10-CM

## 2024-06-18 DIAGNOSIS — N45.3 ACUTE EPIDIDYMO-ORCHITIS: ICD-10-CM

## 2024-06-18 LAB
APPEARANCE UR: CLEAR
BACTERIA URNS QL MICRO: NEGATIVE /HPF
BILIRUB UR QL: NEGATIVE
CASTS URNS QL MICRO: ABNORMAL /LPF (ref 0–2)
COLOR UR: ABNORMAL
EPI CELLS #/AREA URNS HPF: ABNORMAL /HPF (ref 0–5)
GLUCOSE UR STRIP.AUTO-MCNC: >1000 MG/DL
HGB UR QL STRIP: ABNORMAL
HYALINE CASTS URNS QL MICRO: ABNORMAL /LPF
KETONES UR QL STRIP.AUTO: NEGATIVE MG/DL
LEUKOCYTE ESTERASE UR QL STRIP.AUTO: NEGATIVE
NITRITE UR QL STRIP.AUTO: NEGATIVE
PH UR STRIP: 5.5 (ref 5–9)
PROT UR STRIP-MCNC: NEGATIVE MG/DL
RBC #/AREA URNS HPF: ABNORMAL /HPF (ref 0–5)
SP GR UR REFRACTOMETRY: 1.02 (ref 1–1.02)
UROBILINOGEN UR QL STRIP.AUTO: 0.2 EU/DL (ref 0.2–1)
WBC URNS QL MICRO: ABNORMAL /HPF (ref 0–4)

## 2024-06-18 PROCEDURE — 99213 OFFICE O/P EST LOW 20 MIN: CPT | Performed by: INTERNAL MEDICINE

## 2024-06-18 PROCEDURE — 3077F SYST BP >= 140 MM HG: CPT | Performed by: INTERNAL MEDICINE

## 2024-06-18 PROCEDURE — 3079F DIAST BP 80-89 MM HG: CPT | Performed by: INTERNAL MEDICINE

## 2024-06-18 RX ORDER — LEVOFLOXACIN 500 MG/1
500 TABLET, FILM COATED ORAL DAILY
Qty: 7 TABLET | Refills: 0 | Status: SHIPPED | OUTPATIENT
Start: 2024-06-18 | End: 2024-06-25

## 2024-06-18 RX ORDER — GABAPENTIN 300 MG/1
300 CAPSULE ORAL NIGHTLY
Qty: 30 CAPSULE | Refills: 0 | Status: SHIPPED | OUTPATIENT
Start: 2024-06-18 | End: 2024-07-18

## 2024-06-18 SDOH — ECONOMIC STABILITY: FOOD INSECURITY: WITHIN THE PAST 12 MONTHS, YOU WORRIED THAT YOUR FOOD WOULD RUN OUT BEFORE YOU GOT MONEY TO BUY MORE.: NEVER TRUE

## 2024-06-18 SDOH — ECONOMIC STABILITY: FOOD INSECURITY: WITHIN THE PAST 12 MONTHS, THE FOOD YOU BOUGHT JUST DIDN'T LAST AND YOU DIDN'T HAVE MONEY TO GET MORE.: NEVER TRUE

## 2024-06-18 SDOH — ECONOMIC STABILITY: INCOME INSECURITY: HOW HARD IS IT FOR YOU TO PAY FOR THE VERY BASICS LIKE FOOD, HOUSING, MEDICAL CARE, AND HEATING?: NOT HARD AT ALL

## 2024-06-18 ASSESSMENT — PATIENT HEALTH QUESTIONNAIRE - PHQ9
SUM OF ALL RESPONSES TO PHQ QUESTIONS 1-9: 0
10. IF YOU CHECKED OFF ANY PROBLEMS, HOW DIFFICULT HAVE THESE PROBLEMS MADE IT FOR YOU TO DO YOUR WORK, TAKE CARE OF THINGS AT HOME, OR GET ALONG WITH OTHER PEOPLE: NOT DIFFICULT AT ALL
SUM OF ALL RESPONSES TO PHQ QUESTIONS 1-9: 0
SUM OF ALL RESPONSES TO PHQ QUESTIONS 1-9: 0
5. POOR APPETITE OR OVEREATING: NOT AT ALL
6. FEELING BAD ABOUT YOURSELF - OR THAT YOU ARE A FAILURE OR HAVE LET YOURSELF OR YOUR FAMILY DOWN: NOT AT ALL
2. FEELING DOWN, DEPRESSED OR HOPELESS: NOT AT ALL
4. FEELING TIRED OR HAVING LITTLE ENERGY: NOT AT ALL
1. LITTLE INTEREST OR PLEASURE IN DOING THINGS: NOT AT ALL
3. TROUBLE FALLING OR STAYING ASLEEP: NOT AT ALL
SUM OF ALL RESPONSES TO PHQ QUESTIONS 1-9: 0
9. THOUGHTS THAT YOU WOULD BE BETTER OFF DEAD, OR OF HURTING YOURSELF: NOT AT ALL
8. MOVING OR SPEAKING SO SLOWLY THAT OTHER PEOPLE COULD HAVE NOTICED. OR THE OPPOSITE, BEING SO FIGETY OR RESTLESS THAT YOU HAVE BEEN MOVING AROUND A LOT MORE THAN USUAL: NOT AT ALL
SUM OF ALL RESPONSES TO PHQ9 QUESTIONS 1 & 2: 0
7. TROUBLE CONCENTRATING ON THINGS, SUCH AS READING THE NEWSPAPER OR WATCHING TELEVISION: NOT AT ALL

## 2024-06-18 ASSESSMENT — ANXIETY QUESTIONNAIRES
7. FEELING AFRAID AS IF SOMETHING AWFUL MIGHT HAPPEN: NOT AT ALL
GAD7 TOTAL SCORE: 0
IF YOU CHECKED OFF ANY PROBLEMS ON THIS QUESTIONNAIRE, HOW DIFFICULT HAVE THESE PROBLEMS MADE IT FOR YOU TO DO YOUR WORK, TAKE CARE OF THINGS AT HOME, OR GET ALONG WITH OTHER PEOPLE: NOT DIFFICULT AT ALL
2. NOT BEING ABLE TO STOP OR CONTROL WORRYING: NOT AT ALL
5. BEING SO RESTLESS THAT IT IS HARD TO SIT STILL: NOT AT ALL
6. BECOMING EASILY ANNOYED OR IRRITABLE: NOT AT ALL
1. FEELING NERVOUS, ANXIOUS, OR ON EDGE: NOT AT ALL
4. TROUBLE RELAXING: NOT AT ALL
3. WORRYING TOO MUCH ABOUT DIFFERENT THINGS: NOT AT ALL

## 2024-06-18 ASSESSMENT — ENCOUNTER SYMPTOMS: BACK PAIN: 1

## 2024-06-18 NOTE — PROGRESS NOTES
(NORVASC) 5 MG tablet TAKE 1 TABLET DAILY 90 tablet 3    losartan (COZAAR) 100 MG tablet TAKE 1 TABLET DAILY 90 tablet 3    metFORMIN (GLUCOPHAGE-XR) 750 MG extended release tablet Take 1 tablet by mouth 2 times daily (before meals) 180 tablet 3    Evolocumab (REPATHA SURECLICK) 140 MG/ML SOAJ Inject 140 mg into the skin every 30 days 2 Adjustable Dose Pre-filled Pen Syringe 11    nitroGLYCERIN (NITROSTAT) 0.4 MG SL tablet PLACE 1 TABLET UNDER TONGUE EVERY 5 MINS, UP TO 3 DOSES AS NEEDED FOR CHEST PAIN 75 tablet 1    ezetimibe (ZETIA) 10 MG tablet TAKE 1 TABLET DAILY 90 tablet 3    colestipol (COLESTID) 1 g tablet Take 1 tablet by mouth daily      Coenzyme Q10 (COQ10 PO) Take by mouth      aspirin 81 MG EC tablet Take 1 tablet by mouth      ciclopirox (LOPROX) 0.77 % cream Apply topically 2 times daily      fluticasone (CUTIVATE) 0.05 % cream Apply topically daily       No current facility-administered medications for this visit.     Social History:  Social History     Tobacco Use    Smoking status: Former     Current packs/day: 0.00     Average packs/day: 1 pack/day for 15.0 years (15.0 ttl pk-yrs)     Types: Cigarettes     Start date: 1991     Quit date: 2006     Years since quittin.1    Smokeless tobacco: Former     Quit date: 2006   Substance Use Topics    Alcohol use: No     Family History  Family History   Problem Relation Age of Onset    Kidney Disease Son         secondary to congenital vessel disease    Kidney Disease Paternal Uncle     Diabetes Paternal Uncle     Heart Surgery Paternal Uncle         in 70s    Heart Attack Paternal Uncle     Heart Surgery Paternal Aunt     Alzheimer's Disease Father     Heart Surgery Father         mid to late 60s    Heart Disease Father     Other Mother         \"valve issue\"    Pulmonary Embolism Mother         protien C deficiency    Hypertension Brother     Sleep Apnea Brother        Review of Systems   Constitutional:  Negative for chills and fever.

## 2024-06-25 ENCOUNTER — OFFICE VISIT (OUTPATIENT)
Dept: INTERNAL MEDICINE CLINIC | Facility: CLINIC | Age: 64
End: 2024-06-25
Payer: COMMERCIAL

## 2024-06-25 VITALS
SYSTOLIC BLOOD PRESSURE: 107 MMHG | WEIGHT: 240 LBS | HEART RATE: 84 BPM | TEMPERATURE: 98 F | HEIGHT: 67 IN | DIASTOLIC BLOOD PRESSURE: 73 MMHG | OXYGEN SATURATION: 95 % | BODY MASS INDEX: 37.67 KG/M2

## 2024-06-25 DIAGNOSIS — N45.3 ACUTE EPIDIDYMO-ORCHITIS: Primary | ICD-10-CM

## 2024-06-25 PROCEDURE — 3078F DIAST BP <80 MM HG: CPT | Performed by: INTERNAL MEDICINE

## 2024-06-25 PROCEDURE — 3074F SYST BP LT 130 MM HG: CPT | Performed by: INTERNAL MEDICINE

## 2024-06-25 PROCEDURE — 99212 OFFICE O/P EST SF 10 MIN: CPT | Performed by: INTERNAL MEDICINE

## 2024-06-25 RX ORDER — HYDROCODONE BITARTRATE AND ACETAMINOPHEN 7.5; 325 MG/1; MG/1
1 TABLET ORAL EVERY 6 HOURS PRN
COMMUNITY
Start: 2024-06-23

## 2024-06-25 SDOH — ECONOMIC STABILITY: FOOD INSECURITY: WITHIN THE PAST 12 MONTHS, THE FOOD YOU BOUGHT JUST DIDN'T LAST AND YOU DIDN'T HAVE MONEY TO GET MORE.: NEVER TRUE

## 2024-06-25 SDOH — ECONOMIC STABILITY: INCOME INSECURITY: HOW HARD IS IT FOR YOU TO PAY FOR THE VERY BASICS LIKE FOOD, HOUSING, MEDICAL CARE, AND HEATING?: NOT HARD AT ALL

## 2024-06-25 SDOH — ECONOMIC STABILITY: FOOD INSECURITY: WITHIN THE PAST 12 MONTHS, YOU WORRIED THAT YOUR FOOD WOULD RUN OUT BEFORE YOU GOT MONEY TO BUY MORE.: NEVER TRUE

## 2024-06-25 ASSESSMENT — PATIENT HEALTH QUESTIONNAIRE - PHQ9
3. TROUBLE FALLING OR STAYING ASLEEP: NOT AT ALL
6. FEELING BAD ABOUT YOURSELF - OR THAT YOU ARE A FAILURE OR HAVE LET YOURSELF OR YOUR FAMILY DOWN: NOT AT ALL
7. TROUBLE CONCENTRATING ON THINGS, SUCH AS READING THE NEWSPAPER OR WATCHING TELEVISION: NOT AT ALL
2. FEELING DOWN, DEPRESSED OR HOPELESS: NOT AT ALL
SUM OF ALL RESPONSES TO PHQ QUESTIONS 1-9: 0
9. THOUGHTS THAT YOU WOULD BE BETTER OFF DEAD, OR OF HURTING YOURSELF: NOT AT ALL
1. LITTLE INTEREST OR PLEASURE IN DOING THINGS: NOT AT ALL
8. MOVING OR SPEAKING SO SLOWLY THAT OTHER PEOPLE COULD HAVE NOTICED. OR THE OPPOSITE, BEING SO FIGETY OR RESTLESS THAT YOU HAVE BEEN MOVING AROUND A LOT MORE THAN USUAL: NOT AT ALL
5. POOR APPETITE OR OVEREATING: NOT AT ALL
SUM OF ALL RESPONSES TO PHQ9 QUESTIONS 1 & 2: 0
SUM OF ALL RESPONSES TO PHQ QUESTIONS 1-9: 0
SUM OF ALL RESPONSES TO PHQ QUESTIONS 1-9: 0
10. IF YOU CHECKED OFF ANY PROBLEMS, HOW DIFFICULT HAVE THESE PROBLEMS MADE IT FOR YOU TO DO YOUR WORK, TAKE CARE OF THINGS AT HOME, OR GET ALONG WITH OTHER PEOPLE: NOT DIFFICULT AT ALL
SUM OF ALL RESPONSES TO PHQ QUESTIONS 1-9: 0
4. FEELING TIRED OR HAVING LITTLE ENERGY: NOT AT ALL

## 2024-06-25 ASSESSMENT — ANXIETY QUESTIONNAIRES
1. FEELING NERVOUS, ANXIOUS, OR ON EDGE: NOT AT ALL
4. TROUBLE RELAXING: NOT AT ALL
3. WORRYING TOO MUCH ABOUT DIFFERENT THINGS: NOT AT ALL
6. BECOMING EASILY ANNOYED OR IRRITABLE: NOT AT ALL
7. FEELING AFRAID AS IF SOMETHING AWFUL MIGHT HAPPEN: NOT AT ALL
2. NOT BEING ABLE TO STOP OR CONTROL WORRYING: NOT AT ALL
GAD7 TOTAL SCORE: 0
5. BEING SO RESTLESS THAT IT IS HARD TO SIT STILL: NOT AT ALL
IF YOU CHECKED OFF ANY PROBLEMS ON THIS QUESTIONNAIRE, HOW DIFFICULT HAVE THESE PROBLEMS MADE IT FOR YOU TO DO YOUR WORK, TAKE CARE OF THINGS AT HOME, OR GET ALONG WITH OTHER PEOPLE: NOT DIFFICULT AT ALL

## 2024-06-25 ASSESSMENT — ENCOUNTER SYMPTOMS: BACK PAIN: 1

## 2024-06-25 NOTE — PROGRESS NOTES
[facility]  Dylon Gallagher M.D.  Internal Medicine  [address] [place] [address]  Office : [phone]  Fax : [phone]    Chief Complaint   Patient presents with    Follow-up     ER follow up for testicular pain and back pain        History of Present Illness:  Tejinder De La Fuente is a 63 y.o. male.  HPI    Epidymo-orchitis  Seen in ED and testicular torsion was excluded by ultrasound.  He completed antibiotics yesterday and is on the way to recovery.  Less pain and swelling.    Past Medical History:  Past Medical History:   Diagnosis Date    Angina pectoris (HCC)     Anticoagulant long-term use     Anxiety     ASHD (arteriosclerotic heart disease) 2014    Calcium score = 1056    Bursitis     Cancer (HCC)     basal cell carcinoma    Coronary artery disease     Coronary atherosclerosis due to calcified coronary lesion (CODE) 5/23/2016    Diverticulitis     Duodenal ulcer     DVT (deep venous thrombosis) (HCC)     2 episodes    Elevated liver enzymes 3/1/2018    GERD (gastroesophageal reflux disease)     controlled with omeprazole    HTN (hypertension)     controlled with medsication    Hypercholesterolemia     Hypothyroidism     Kidney stones     Myositis     Obesity     Personal history of DVT (deep vein thrombosis) 11/18/2016    Protein C deficiency (HCC)     Rhabdomyolysis 2018    [facility] DT    Sleep apnea     using CPAP    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 (HCC)     Varicella      Past Surgical History:  Past Surgical History:   Procedure Laterality Date    CARDIAC CATHETERIZATION  2015    no stents    CHOLECYSTECTOMY, LAPAROSCOPIC N/A 3/7/2023    LAPAROSCOPIC CHOLECYSTECTOMY; TAJ-CUT LIVER BIOPSIES performed by Jonathan Hudson MD at [facility] MAIN OR    COLONOSCOPY  2013    HEENT Right 11/2020    skin lesion bx of ear    OTHER SURGICAL HISTORY  02/2022    dental surgery    URETEROLITHOTOMY Right 2013    WISDOM TOOTH EXTRACTION  1980's     Allergies:

## 2024-08-14 DIAGNOSIS — I10 ESSENTIAL HYPERTENSION: ICD-10-CM

## 2024-08-14 RX ORDER — HYDROCHLOROTHIAZIDE 12.5 MG/1
12.5 TABLET ORAL DAILY
Qty: 90 TABLET | Refills: 3 | Status: SHIPPED | OUTPATIENT
Start: 2024-08-14

## 2024-08-14 RX ORDER — EZETIMIBE 10 MG/1
10 TABLET ORAL DAILY
Qty: 90 TABLET | Refills: 3 | Status: SHIPPED | OUTPATIENT
Start: 2024-08-14

## 2024-08-14 NOTE — TELEPHONE ENCOUNTER
Requested Prescriptions     Pending Prescriptions Disp Refills    ezetimibe (ZETIA) 10 MG tablet 90 tablet 3     Sig: Take 1 tablet by mouth daily    hydroCHLOROthiazide 12.5 MG tablet 90 tablet 3     Sig: Take 1 tablet by mouth daily

## 2024-09-17 DIAGNOSIS — E03.9 ACQUIRED HYPOTHYROIDISM: ICD-10-CM

## 2024-09-17 RX ORDER — LEVOTHYROXINE SODIUM 200 UG/1
200 TABLET ORAL
Qty: 90 TABLET | Refills: 1 | OUTPATIENT
Start: 2024-09-17

## 2024-10-15 ENCOUNTER — OFFICE VISIT (OUTPATIENT)
Dept: INTERNAL MEDICINE CLINIC | Facility: CLINIC | Age: 64
End: 2024-10-15
Payer: COMMERCIAL

## 2024-10-15 VITALS
OXYGEN SATURATION: 96 % | HEIGHT: 67 IN | TEMPERATURE: 97.9 F | SYSTOLIC BLOOD PRESSURE: 127 MMHG | DIASTOLIC BLOOD PRESSURE: 81 MMHG | BODY MASS INDEX: 38.14 KG/M2 | WEIGHT: 243 LBS | HEART RATE: 61 BPM

## 2024-10-15 DIAGNOSIS — Z79.01 CHRONIC ANTICOAGULATION: ICD-10-CM

## 2024-10-15 DIAGNOSIS — I10 ESSENTIAL HYPERTENSION: ICD-10-CM

## 2024-10-15 DIAGNOSIS — E03.9 ACQUIRED HYPOTHYROIDISM: ICD-10-CM

## 2024-10-15 DIAGNOSIS — E11.9 TYPE 2 DIABETES MELLITUS WITHOUT COMPLICATION, WITHOUT LONG-TERM CURRENT USE OF INSULIN (HCC): Primary | ICD-10-CM

## 2024-10-15 DIAGNOSIS — M33.20 POLYMYOSITIS (HCC): ICD-10-CM

## 2024-10-15 DIAGNOSIS — M54.50 ACUTE RIGHT-SIDED LOW BACK PAIN WITHOUT SCIATICA: ICD-10-CM

## 2024-10-15 PROCEDURE — 3079F DIAST BP 80-89 MM HG: CPT | Performed by: INTERNAL MEDICINE

## 2024-10-15 PROCEDURE — 3044F HG A1C LEVEL LT 7.0%: CPT | Performed by: INTERNAL MEDICINE

## 2024-10-15 PROCEDURE — 3074F SYST BP LT 130 MM HG: CPT | Performed by: INTERNAL MEDICINE

## 2024-10-15 PROCEDURE — 99214 OFFICE O/P EST MOD 30 MIN: CPT | Performed by: INTERNAL MEDICINE

## 2024-10-15 RX ORDER — AZATHIOPRINE 50 MG/1
TABLET ORAL
Qty: 450 TABLET | Refills: 1 | Status: SHIPPED | OUTPATIENT
Start: 2024-10-15

## 2024-10-15 RX ORDER — RIVAROXABAN 10 MG/1
10 TABLET, FILM COATED ORAL
Qty: 90 TABLET | Refills: 1 | Status: SHIPPED | OUTPATIENT
Start: 2024-10-15

## 2024-10-15 RX ORDER — GABAPENTIN 300 MG/1
300 CAPSULE ORAL NIGHTLY
Qty: 30 CAPSULE | Refills: 0 | Status: CANCELLED | OUTPATIENT
Start: 2024-10-15 | End: 2024-11-14

## 2024-10-15 RX ORDER — LEVOTHYROXINE SODIUM 200 UG/1
200 TABLET ORAL
Qty: 90 TABLET | Refills: 1 | Status: SHIPPED | OUTPATIENT
Start: 2024-10-15

## 2024-10-15 RX ORDER — METHYLPREDNISOLONE 4 MG/1
4 TABLET ORAL
Qty: 90 TABLET | Refills: 1 | Status: SHIPPED | OUTPATIENT
Start: 2024-10-15

## 2024-10-15 ASSESSMENT — ENCOUNTER SYMPTOMS
BACK PAIN: 0
SHORTNESS OF BREATH: 0

## 2024-10-15 NOTE — PROGRESS NOTES
[facility]  Dylon Gallagher M.D.  Internal Medicine  [address] [place] [address]  Office : [phone]  Fax : [phone]    Chief Complaint   Patient presents with    Diabetes     4 mo follow up       History of Present Illness:  Tejinder De La Fuente is a 63 y.o. male.  HPI    Diabetes Mellitus  Patient presents  for follow up on diabetes.  Onset of symptoms was several years ago. Patient describes symptoms as none. Course to date has been well controlled.Diet and Lifestyle: follows a diabetic diet regularly  exercises sporadically  nonsmoker  Home glucose monitoring:  Results average n/a. Patient denies polyuria and polydipsia. No wounds in lower limbs.  Most recent HbA1c was   Hemoglobin A1C   Date Value Ref Range Status   01/23/2024 5.9 (H) 4.8 - 5.6 % Final     Hemoglobin A1C, POC   Date Value Ref Range Status   05/06/2024 5.9 % Final       Hypertension  Patient is in for Hypertension which is stable.    Diet and Lifestyle: generally follows a low sodium diet  Home BP Monitoring: is not measured at home. Patient's recent blood pressures were   BP Readings from Last 3 Encounters:   10/15/24 127/81   06/25/24 107/73   06/18/24 (!) 151/87   .   taking medications as instructed, no medication side effects noted, no TIA's, no chest pain on exertion, no dyspnea on exertion, no swelling of ankles. Cardiac risk factors consist of advanced age (older than 55 for men, 65 for women), diabetes mellitus, dyslipidemia, hypertension, and male gender.  Lab Results   Component Value Date/Time     01/23/2024 09:12 AM    K 4.1 01/23/2024 09:12 AM     01/23/2024 09:12 AM    CO2 28 01/23/2024 09:12 AM    BUN 19 01/23/2024 09:12 AM    CREATININE 0.90 01/23/2024 09:12 AM    GLUCOSE 103 01/23/2024 09:12 AM    CALCIUM 10.0 01/23/2024 09:12 AM    LABGLOM >60 01/23/2024 09:12 AM        Hyperlipidemia  Patient is in for follow-up for hyperlipidemia.  Diet and

## 2024-10-23 ENCOUNTER — PATIENT MESSAGE (OUTPATIENT)
Dept: ENDOCRINOLOGY | Age: 64
End: 2024-10-23

## 2024-10-23 DIAGNOSIS — T38.0X5D STEROID-INDUCED DIABETES MELLITUS, SUBSEQUENT ENCOUNTER (HCC): Primary | ICD-10-CM

## 2024-10-23 DIAGNOSIS — E09.9 STEROID-INDUCED DIABETES MELLITUS, SUBSEQUENT ENCOUNTER (HCC): Primary | ICD-10-CM

## 2024-10-23 RX ORDER — SEMAGLUTIDE 1.34 MG/ML
1 INJECTION, SOLUTION SUBCUTANEOUS
Qty: 9 ML | Refills: 3 | Status: SHIPPED | OUTPATIENT
Start: 2024-10-23

## 2024-10-23 NOTE — TELEPHONE ENCOUNTER
Paula response:    Hi,    I sent 1mg ozempic to [facility] on [address] for you to try at a higher dose    We can discuss the difference between mounjaro and ozempic at our visit. Feel free to check with your insurance about coverage before we meet    See you soon    ~Linda

## 2024-11-12 ENCOUNTER — OFFICE VISIT (OUTPATIENT)
Dept: ENDOCRINOLOGY | Age: 64
End: 2024-11-12
Payer: COMMERCIAL

## 2024-11-12 VITALS
HEART RATE: 82 BPM | OXYGEN SATURATION: 97 % | BODY MASS INDEX: 38.37 KG/M2 | SYSTOLIC BLOOD PRESSURE: 122 MMHG | DIASTOLIC BLOOD PRESSURE: 84 MMHG | WEIGHT: 245 LBS

## 2024-11-12 DIAGNOSIS — T38.0X5D STEROID-INDUCED DIABETES MELLITUS, SUBSEQUENT ENCOUNTER (HCC): ICD-10-CM

## 2024-11-12 DIAGNOSIS — K76.0 FATTY LIVER: ICD-10-CM

## 2024-11-12 DIAGNOSIS — E66.9 OBESITY (BMI 30-39.9): ICD-10-CM

## 2024-11-12 DIAGNOSIS — E09.9 STEROID-INDUCED DIABETES MELLITUS, SUBSEQUENT ENCOUNTER (HCC): ICD-10-CM

## 2024-11-12 DIAGNOSIS — Z78.9 STATIN INTOLERANCE: ICD-10-CM

## 2024-11-12 DIAGNOSIS — E03.9 PRIMARY HYPOTHYROIDISM: ICD-10-CM

## 2024-11-12 DIAGNOSIS — I25.10 CORONARY ARTERY DISEASE INVOLVING NATIVE CORONARY ARTERY OF NATIVE HEART WITHOUT ANGINA PECTORIS: ICD-10-CM

## 2024-11-12 DIAGNOSIS — I10 PRIMARY HYPERTENSION: ICD-10-CM

## 2024-11-12 DIAGNOSIS — E03.9 PRIMARY HYPOTHYROIDISM: Primary | ICD-10-CM

## 2024-11-12 LAB
ALBUMIN SERPL-MCNC: 3.9 G/DL (ref 3.2–4.6)
ALBUMIN/GLOB SERPL: 1.1 (ref 1–1.9)
ALP SERPL-CCNC: 65 U/L (ref 40–129)
ALT SERPL-CCNC: 45 U/L (ref 8–55)
ANION GAP SERPL CALC-SCNC: 12 MMOL/L (ref 7–16)
AST SERPL-CCNC: 22 U/L (ref 15–37)
BILIRUB SERPL-MCNC: 0.3 MG/DL (ref 0–1.2)
BUN SERPL-MCNC: 23 MG/DL (ref 8–23)
CALCIUM SERPL-MCNC: 10 MG/DL (ref 8.8–10.2)
CHLORIDE SERPL-SCNC: 99 MMOL/L (ref 98–107)
CO2 SERPL-SCNC: 29 MMOL/L (ref 20–29)
CREAT SERPL-MCNC: 0.85 MG/DL (ref 0.8–1.3)
GLOBULIN SER CALC-MCNC: 3.5 G/DL (ref 2.3–3.5)
GLUCOSE SERPL-MCNC: 126 MG/DL (ref 70–99)
HBA1C MFR BLD: 6.1 %
POTASSIUM SERPL-SCNC: 4.8 MMOL/L (ref 3.5–5.1)
PROT SERPL-MCNC: 7.4 G/DL (ref 6.3–8.2)
SODIUM SERPL-SCNC: 140 MMOL/L (ref 136–145)
TSH W FREE THYROID IF ABNORMAL: 0.29 UIU/ML (ref 0.27–4.2)

## 2024-11-12 PROCEDURE — 83036 HEMOGLOBIN GLYCOSYLATED A1C: CPT | Performed by: PHYSICIAN ASSISTANT

## 2024-11-12 PROCEDURE — 3079F DIAST BP 80-89 MM HG: CPT | Performed by: PHYSICIAN ASSISTANT

## 2024-11-12 PROCEDURE — 3074F SYST BP LT 130 MM HG: CPT | Performed by: PHYSICIAN ASSISTANT

## 2024-11-12 PROCEDURE — 99214 OFFICE O/P EST MOD 30 MIN: CPT | Performed by: PHYSICIAN ASSISTANT

## 2024-11-12 RX ORDER — SEMAGLUTIDE 2.68 MG/ML
2 INJECTION, SOLUTION SUBCUTANEOUS
Qty: 9 ML | Refills: 3 | Status: SHIPPED | OUTPATIENT
Start: 2024-11-12

## 2024-11-12 RX ORDER — METFORMIN HYDROCHLORIDE 750 MG/1
750 TABLET, EXTENDED RELEASE ORAL DAILY
Qty: 90 TABLET | Refills: 3 | Status: SHIPPED | OUTPATIENT
Start: 2024-11-12

## 2024-11-12 RX ORDER — EMPAGLIFLOZIN 25 MG/1
25 TABLET, FILM COATED ORAL DAILY
Qty: 90 TABLET | Refills: 3 | Status: SHIPPED | OUTPATIENT
Start: 2024-11-12

## 2024-11-12 ASSESSMENT — ENCOUNTER SYMPTOMS
CONSTIPATION: 0
VOMITING: 0
NAUSEA: 0
VOICE CHANGE: 0
TROUBLE SWALLOWING: 0
DIARRHEA: 1

## 2024-11-12 NOTE — PROGRESS NOTES
[facility] ENDOCRINOLOGY   AND   THYROID NODULE CLINIC    Linda Olson PA-C  [facility] Endocrinology and Thyroid Nodule Clinic  [address] [facility] [address], [place] [address]  Phone [phone]  Facsimile [phone]          Tejinder De La Fuente is a 63 y.o. male seen 11/12/2024 for follow up evaluation of primary hypothryoidism and steroid induced diabetes         Assessment and Plan:      1. Primary hypothyroidism  Patient is taking 200 mcg of generic levothyroxine correctly and was euthyroid. Update labs today    - TSH with Reflex; Future    2. 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 mellitus, subsequent encounter (HCC)  Glycemic control is waning slightly on current treatment regimen.  Recent increase of Ozempic from 0.5 mg to 1 mg dose without any notable change in condition.  Given cardiovascular history and history of fatty liver disease maximal therapy is indicated.  I will prescribe 2 mg Ozempic.  We did discuss Mounjaro as an alternative option    - OZEMPIC, 2 MG/DOSE, 8 MG/3ML SOPN sc injection; Inject 2 mg into the skin every 7 days  Dispense: 9 mL; Refill: 3  - metFORMIN (GLUCOPHAGE-XR) 750 MG extended release tablet; Take 1 tablet by mouth daily  Dispense: 90 tablet; Refill: 3  - Comprehensive Metabolic Panel; Future  - JARDIANCE 25 MG tablet; Take 1 tablet by mouth daily  Dispense: 90 tablet; Refill: 3  - HM DIABETES FOOT EXAM      2. 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 mellitus, subsequent encounter (HCC)  Now on 0.5mg ozempic instead of trulicity. No significant change to SE, weight or glycemic control. Plan to uptitrate to max tolerated dose.    Interested if coming off metformin. Can decrease to once daily and watch for worsening control     - AMB POC HEMOGLOBIN A1C  - JARDIANCE 25 MG tablet; Take 1 tablet by mouth daily  Dispense: 90 tablet; Refill: 3  - OZEMPIC, 0.25 OR 0.5 MG/DOSE, 2 MG/3ML SOPN; Inject 0.5 mg into the skin every 7 days E11.65  Dispense: 9 mL; Refill: 3  - HM DIABETES FOOT EXAM    3.

## 2024-11-13 DIAGNOSIS — E11.9 TYPE 2 DIABETES MELLITUS WITHOUT COMPLICATION, WITHOUT LONG-TERM CURRENT USE OF INSULIN (HCC): ICD-10-CM

## 2024-11-13 RX ORDER — SEMAGLUTIDE 0.68 MG/ML
0.5 INJECTION, SOLUTION SUBCUTANEOUS
Refills: 3 | OUTPATIENT
Start: 2024-11-13

## 2025-02-03 SDOH — ECONOMIC STABILITY: TRANSPORTATION INSECURITY
IN THE PAST 12 MONTHS, HAS THE LACK OF TRANSPORTATION KEPT YOU FROM MEDICAL APPOINTMENTS OR FROM GETTING MEDICATIONS?: PATIENT DECLINED

## 2025-02-03 SDOH — ECONOMIC STABILITY: INCOME INSECURITY: IN THE LAST 12 MONTHS, WAS THERE A TIME WHEN YOU WERE NOT ABLE TO PAY THE MORTGAGE OR RENT ON TIME?: PATIENT DECLINED

## 2025-02-03 SDOH — ECONOMIC STABILITY: FOOD INSECURITY: WITHIN THE PAST 12 MONTHS, THE FOOD YOU BOUGHT JUST DIDN'T LAST AND YOU DIDN'T HAVE MONEY TO GET MORE.: PATIENT DECLINED

## 2025-02-03 SDOH — ECONOMIC STABILITY: FOOD INSECURITY: WITHIN THE PAST 12 MONTHS, YOU WORRIED THAT YOUR FOOD WOULD RUN OUT BEFORE YOU GOT MONEY TO BUY MORE.: PATIENT DECLINED

## 2025-02-03 ASSESSMENT — PATIENT HEALTH QUESTIONNAIRE - PHQ9
3. TROUBLE FALLING OR STAYING ASLEEP: NOT AT ALL
7. TROUBLE CONCENTRATING ON THINGS, SUCH AS READING THE NEWSPAPER OR WATCHING TELEVISION: NOT AT ALL
SUM OF ALL RESPONSES TO PHQ QUESTIONS 1-9: 0
5. POOR APPETITE OR OVEREATING: NOT AT ALL
8. MOVING OR SPEAKING SO SLOWLY THAT OTHER PEOPLE COULD HAVE NOTICED. OR THE OPPOSITE, BEING SO FIGETY OR RESTLESS THAT YOU HAVE BEEN MOVING AROUND A LOT MORE THAN USUAL: NOT AT ALL
8. MOVING OR SPEAKING SO SLOWLY THAT OTHER PEOPLE COULD HAVE NOTICED. OR THE OPPOSITE - BEING SO FIDGETY OR RESTLESS THAT YOU HAVE BEEN MOVING AROUND A LOT MORE THAN USUAL: NOT AT ALL
4. FEELING TIRED OR HAVING LITTLE ENERGY: NOT AT ALL
7. TROUBLE CONCENTRATING ON THINGS, SUCH AS READING THE NEWSPAPER OR WATCHING TELEVISION: NOT AT ALL
6. FEELING BAD ABOUT YOURSELF - OR THAT YOU ARE A FAILURE OR HAVE LET YOURSELF OR YOUR FAMILY DOWN: NOT AT ALL
SUM OF ALL RESPONSES TO PHQ QUESTIONS 1-9: 0
SUM OF ALL RESPONSES TO PHQ QUESTIONS 1-9: 0
2. FEELING DOWN, DEPRESSED OR HOPELESS: NOT AT ALL
5. POOR APPETITE OR OVEREATING: NOT AT ALL
4. FEELING TIRED OR HAVING LITTLE ENERGY: NOT AT ALL
SUM OF ALL RESPONSES TO PHQ QUESTIONS 1-9: 0
10. IF YOU CHECKED OFF ANY PROBLEMS, HOW DIFFICULT HAVE THESE PROBLEMS MADE IT FOR YOU TO DO YOUR WORK, TAKE CARE OF THINGS AT HOME, OR GET ALONG WITH OTHER PEOPLE: NOT DIFFICULT AT ALL
6. FEELING BAD ABOUT YOURSELF - OR THAT YOU ARE A FAILURE OR HAVE LET YOURSELF OR YOUR FAMILY DOWN: NOT AT ALL
3. TROUBLE FALLING OR STAYING ASLEEP: NOT AT ALL
SUM OF ALL RESPONSES TO PHQ QUESTIONS 1-9: 0
1. LITTLE INTEREST OR PLEASURE IN DOING THINGS: NOT AT ALL
1. LITTLE INTEREST OR PLEASURE IN DOING THINGS: NOT AT ALL
SUM OF ALL RESPONSES TO PHQ9 QUESTIONS 1 & 2: 0
2. FEELING DOWN, DEPRESSED OR HOPELESS: NOT AT ALL
9. THOUGHTS THAT YOU WOULD BE BETTER OFF DEAD, OR OF HURTING YOURSELF: NOT AT ALL
9. THOUGHTS THAT YOU WOULD BE BETTER OFF DEAD, OR OF HURTING YOURSELF: NOT AT ALL
10. IF YOU CHECKED OFF ANY PROBLEMS, HOW DIFFICULT HAVE THESE PROBLEMS MADE IT FOR YOU TO DO YOUR WORK, TAKE CARE OF THINGS AT HOME, OR GET ALONG WITH OTHER PEOPLE: NOT DIFFICULT AT ALL

## 2025-02-04 ENCOUNTER — PATIENT MESSAGE (OUTPATIENT)
Dept: ENDOCRINOLOGY | Age: 65
End: 2025-02-04

## 2025-02-04 ENCOUNTER — OFFICE VISIT (OUTPATIENT)
Dept: INTERNAL MEDICINE CLINIC | Facility: CLINIC | Age: 65
End: 2025-02-04

## 2025-02-04 VITALS
DIASTOLIC BLOOD PRESSURE: 70 MMHG | HEART RATE: 68 BPM | SYSTOLIC BLOOD PRESSURE: 123 MMHG | OXYGEN SATURATION: 98 % | HEIGHT: 67 IN | BODY MASS INDEX: 38.92 KG/M2 | WEIGHT: 248 LBS | TEMPERATURE: 97.5 F

## 2025-02-04 DIAGNOSIS — Z79.01 CHRONIC ANTICOAGULATION: ICD-10-CM

## 2025-02-04 DIAGNOSIS — T38.0X5D STEROID-INDUCED DIABETES MELLITUS, SUBSEQUENT ENCOUNTER (HCC): Primary | ICD-10-CM

## 2025-02-04 DIAGNOSIS — E66.01 MORBID (SEVERE) OBESITY DUE TO EXCESS CALORIES: ICD-10-CM

## 2025-02-04 DIAGNOSIS — E78.49 OTHER HYPERLIPIDEMIA: ICD-10-CM

## 2025-02-04 DIAGNOSIS — E11.9 TYPE 2 DIABETES MELLITUS WITHOUT COMPLICATION, WITHOUT LONG-TERM CURRENT USE OF INSULIN (HCC): Primary | ICD-10-CM

## 2025-02-04 DIAGNOSIS — Z00.00 WELCOME TO MEDICARE PREVENTIVE VISIT: ICD-10-CM

## 2025-02-04 DIAGNOSIS — R41.3 MEMORY LOSS: ICD-10-CM

## 2025-02-04 DIAGNOSIS — E03.9 ACQUIRED HYPOTHYROIDISM: ICD-10-CM

## 2025-02-04 DIAGNOSIS — F34.1 DYSTHYMIC DISORDER: ICD-10-CM

## 2025-02-04 DIAGNOSIS — M33.20 POLYMYOSITIS (HCC): ICD-10-CM

## 2025-02-04 DIAGNOSIS — E09.9 STEROID-INDUCED DIABETES MELLITUS, SUBSEQUENT ENCOUNTER (HCC): Primary | ICD-10-CM

## 2025-02-04 DIAGNOSIS — Z12.5 SCREENING FOR PROSTATE CANCER: ICD-10-CM

## 2025-02-04 DIAGNOSIS — I10 ESSENTIAL HYPERTENSION: ICD-10-CM

## 2025-02-04 LAB
ALBUMIN SERPL-MCNC: 3.7 G/DL (ref 3.2–4.6)
ALBUMIN/GLOB SERPL: 1.1 (ref 1–1.9)
ALP SERPL-CCNC: 63 U/L (ref 40–129)
ALT SERPL-CCNC: 30 U/L (ref 8–55)
ANION GAP SERPL CALC-SCNC: 13 MMOL/L (ref 7–16)
AST SERPL-CCNC: 31 U/L (ref 15–37)
BASOPHILS # BLD: 0.07 K/UL (ref 0–0.2)
BASOPHILS NFR BLD: 1.3 % (ref 0–2)
BILIRUB DIRECT SERPL-MCNC: <0.2 MG/DL (ref 0–0.4)
BILIRUB SERPL-MCNC: 0.4 MG/DL (ref 0–1.2)
BUN SERPL-MCNC: 15 MG/DL (ref 8–23)
CALCIUM SERPL-MCNC: 9.7 MG/DL (ref 8.8–10.2)
CHLORIDE SERPL-SCNC: 102 MMOL/L (ref 98–107)
CHOLEST SERPL-MCNC: 113 MG/DL (ref 0–200)
CK SERPL-CCNC: 99 U/L (ref 21–215)
CO2 SERPL-SCNC: 25 MMOL/L (ref 20–29)
CREAT SERPL-MCNC: 0.8 MG/DL (ref 0.8–1.3)
CRP SERPL-MCNC: <0.3 MG/DL (ref 0–0.4)
DIFFERENTIAL METHOD BLD: ABNORMAL
EOSINOPHIL # BLD: 0.18 K/UL (ref 0–0.8)
EOSINOPHIL NFR BLD: 3.4 % (ref 0.5–7.8)
ERYTHROCYTE [DISTWIDTH] IN BLOOD BY AUTOMATED COUNT: 14 % (ref 11.9–14.6)
ERYTHROCYTE [SEDIMENTATION RATE] IN BLOOD: 24 MM/HR
GLOBULIN SER CALC-MCNC: 3.5 G/DL (ref 2.3–3.5)
GLUCOSE SERPL-MCNC: 88 MG/DL (ref 70–99)
HCT VFR BLD AUTO: 48.1 % (ref 41.1–50.3)
HDLC SERPL-MCNC: 42 MG/DL (ref 40–60)
HDLC SERPL: 2.7 (ref 0–5)
HGB BLD-MCNC: 15.6 G/DL (ref 13.6–17.2)
IMM GRANULOCYTES # BLD AUTO: 0.04 K/UL (ref 0–0.5)
IMM GRANULOCYTES NFR BLD AUTO: 0.8 % (ref 0–5)
LDLC SERPL CALC-MCNC: 54 MG/DL (ref 0–100)
LYMPHOCYTES # BLD: 0.41 K/UL (ref 0.5–4.6)
LYMPHOCYTES NFR BLD: 7.8 % (ref 13–44)
MCH RBC QN AUTO: 31.5 PG (ref 26.1–32.9)
MCHC RBC AUTO-ENTMCNC: 32.4 G/DL (ref 31.4–35)
MCV RBC AUTO: 97.2 FL (ref 82–102)
MONOCYTES # BLD: 0.48 K/UL (ref 0.1–1.3)
MONOCYTES NFR BLD: 9.1 % (ref 4–12)
NEUTS SEG # BLD: 4.1 K/UL (ref 1.7–8.2)
NEUTS SEG NFR BLD: 77.6 % (ref 43–78)
NRBC # BLD: 0 K/UL (ref 0–0.2)
PLATELET # BLD AUTO: 303 K/UL (ref 150–450)
PMV BLD AUTO: 9.3 FL (ref 9.4–12.3)
POTASSIUM SERPL-SCNC: 4.2 MMOL/L (ref 3.5–5.1)
PROT SERPL-MCNC: 7.2 G/DL (ref 6.3–8.2)
PSA SERPL-MCNC: 0.2 NG/ML (ref 0–4)
RBC # BLD AUTO: 4.95 M/UL (ref 4.23–5.6)
SODIUM SERPL-SCNC: 139 MMOL/L (ref 136–145)
TRIGL SERPL-MCNC: 85 MG/DL (ref 0–150)
TSH, 3RD GENERATION: 0.12 UIU/ML (ref 0.27–4.2)
VIT B12 SERPL-MCNC: 458 PG/ML (ref 193–986)
VLDLC SERPL CALC-MCNC: 17 MG/DL (ref 6–23)
WBC # BLD AUTO: 5.3 K/UL (ref 4.3–11.1)

## 2025-02-04 RX ORDER — DULOXETIN HYDROCHLORIDE 30 MG/1
CAPSULE, DELAYED RELEASE ORAL
Qty: 90 CAPSULE | Refills: 1 | Status: SHIPPED | OUTPATIENT
Start: 2025-02-04

## 2025-02-04 RX ORDER — METHYLPREDNISOLONE 4 MG/1
4 TABLET ORAL
Qty: 90 TABLET | Refills: 1 | Status: SHIPPED | OUTPATIENT
Start: 2025-02-04

## 2025-02-04 RX ORDER — RIVAROXABAN 10 MG/1
10 TABLET, FILM COATED ORAL
Qty: 90 TABLET | Refills: 1 | Status: SHIPPED | OUTPATIENT
Start: 2025-02-04

## 2025-02-04 RX ORDER — TIRZEPATIDE 5 MG/.5ML
5 INJECTION, SOLUTION SUBCUTANEOUS
Qty: 6 ML | Refills: 1 | Status: SHIPPED | OUTPATIENT
Start: 2025-02-04

## 2025-02-04 RX ORDER — AZATHIOPRINE 50 MG/1
TABLET ORAL
Qty: 450 TABLET | Refills: 1 | Status: SHIPPED | OUTPATIENT
Start: 2025-02-04

## 2025-02-04 RX ORDER — LEVOTHYROXINE SODIUM 200 UG/1
200 TABLET ORAL
Qty: 90 TABLET | Refills: 1 | Status: SHIPPED | OUTPATIENT
Start: 2025-02-04

## 2025-02-04 ASSESSMENT — ANXIETY QUESTIONNAIRES
6. BECOMING EASILY ANNOYED OR IRRITABLE: NOT AT ALL
GAD7 TOTAL SCORE: 0
7. FEELING AFRAID AS IF SOMETHING AWFUL MIGHT HAPPEN: NOT AT ALL
4. TROUBLE RELAXING: NOT AT ALL
1. FEELING NERVOUS, ANXIOUS, OR ON EDGE: NOT AT ALL
5. BEING SO RESTLESS THAT IT IS HARD TO SIT STILL: NOT AT ALL
2. NOT BEING ABLE TO STOP OR CONTROL WORRYING: NOT AT ALL
IF YOU CHECKED OFF ANY PROBLEMS ON THIS QUESTIONNAIRE, HOW DIFFICULT HAVE THESE PROBLEMS MADE IT FOR YOU TO DO YOUR WORK, TAKE CARE OF THINGS AT HOME, OR GET ALONG WITH OTHER PEOPLE: NOT DIFFICULT AT ALL
3. WORRYING TOO MUCH ABOUT DIFFERENT THINGS: NOT AT ALL

## 2025-02-04 ASSESSMENT — LIFESTYLE VARIABLES
HOW MANY STANDARD DRINKS CONTAINING ALCOHOL DO YOU HAVE ON A TYPICAL DAY: PATIENT DOES NOT DRINK
HOW OFTEN DO YOU HAVE A DRINK CONTAINING ALCOHOL: NEVER

## 2025-02-04 ASSESSMENT — ENCOUNTER SYMPTOMS: SHORTNESS OF BREATH: 0

## 2025-02-04 NOTE — PATIENT INSTRUCTIONS
Learning About Being Active as an Older Adult  Why is being active important as you get older?     Being active is one of the best things you can do for your health. And it's never too late to start. Being active--or getting active, if you aren't already--has definite benefits. It can:  Give you more energy,  Keep your mind sharp.  Improve balance to reduce your risk of falls.  Help you manage chronic illness with fewer medicines.  No matter how old you are, how fit you are, or what health problems you have, there is a form of activity that will work for you. And the more physical activity you can do, the better your overall health will be.  What kinds of activity can help you stay healthy?  Being more active will make your daily activities easier. Physical activity includes planned exercise and things you do in daily life. There are four types of activity:  Aerobic.  Doing aerobic activity makes your heart and lungs strong.  Includes walking, dancing, and gardening.  Aim for at least 2½ hours spread throughout the week.  It improves your energy and can help you sleep better.  Muscle-strengthening.  This type of activity can help maintain muscle and strengthen bones.  Includes climbing stairs, using resistance bands, and lifting or carrying heavy loads.  Aim for at least twice a week.  It can help protect the knees and other joints.  Stretching.  Stretching gives you better range of motion in joints and muscles.  Includes upper arm stretches, calf stretches, and gentle yoga.  Aim for at least twice a week, preferably after your muscles are warmed up from other activities.  It can help you function better in daily life.  Balancing.  This helps you stay coordinated and have good posture.  Includes heel-to-toe walking, monika chi, and certain types of yoga.  Aim for at least 3 days a week.  It can reduce your risk of falling.  Even if you have a hard time meeting the recommendations, it's better to be more active

## 2025-02-04 NOTE — TELEPHONE ENCOUNTER
Paula response:    Mounjaro 5mg sent to [facility] to replace Ozempic 2mg.     Please look online about how to deliver this medication. It is in an autoinjector and different delivery from the Ozempic pen    Let me know if issues obtaining or tolerating medication     ~Linda

## 2025-02-04 NOTE — PROGRESS NOTES
[facility]  Dylon Gallagher M.D.  Internal Medicine  [address] [place] [address]  Office : [phone]  Fax : [phone]    CHIEF COMPLAINT  Chief Complaint   Patient presents with    Diabetes     ROV    Medicare AWV     Initial AWV     Medicare Annual Wellness Visit    Tejinder De La Fuente is here for Diabetes (ROV) and Medicare AWV (Initial AWV)    Assessment & Plan   Body mass index [BMI] 38.0-38.9, adult (Z68.38)  Polymyositis (HCC)  The following orders have not been finalized:  -     methylPREDNISolone (MEDROL) 4 MG tablet  -     azaTHIOprine (IMURAN) 50 MG tablet  Type 2 diabetes mellitus without complication, without long-term current use of insulin (HCC)  Dysthymic disorder  The following orders have not been finalized:  -     DULoxetine (CYMBALTA) 30 MG extended release capsule  Acquired hypothyroidism  The following orders have not been finalized:  -     levothyroxine (SYNTHROID) 200 MCG tablet  Chronic anticoagulation  The following orders have not been finalized:  -     rivaroxaban (XARELTO) 10 MG TABS tablet  Morbid (severe) obesity due to excess calories (E66.01)  Welcome to Medicare preventive visit       No follow-ups on file.     Subjective       Patient's complete Health Risk Assessment and screening values have been reviewed and are found in Flowsheets. The following problems were reviewed today and where indicated follow up appointments were made and/or referrals ordered.    Positive Risk Factor Screenings with Interventions:     Cognitive:      Words recalled: 2 Words Recalled     Total Score Interpretation: Abnormal Mini-Cog  Interventions:  Check B12        Controlled Medication Review:    Today's Pain Level: No data recorded   Opioid Risk: (Low risk score <55) Opioid risk score: 7    Patient is low risk for opioid use disorder or overdose.    Last PDMP Jonah as Reviewed:  Review User Review Instant Review Result                 
 Wt 112.5 kg (248 lb)   SpO2 98%   BMI 38.84 kg/m²   Body mass index is 38.84 kg/m².    Physical Exam  Vitals reviewed.   Constitutional:       General: He is not in acute distress.     Appearance: Normal appearance. He is obese. He is not ill-appearing or toxic-appearing.   HENT:      Head: Normocephalic and atraumatic.   Eyes:      General: No scleral icterus.     Conjunctiva/sclera: Conjunctivae normal.   Neck:      Vascular: No carotid bruit.   Cardiovascular:      Rate and Rhythm: Normal rate and regular rhythm.      Heart sounds: Normal heart sounds. No murmur heard.  Pulmonary:      Effort: Pulmonary effort is normal.      Breath sounds: Normal breath sounds.   Musculoskeletal:         General: No swelling.   Skin:     Coloration: Skin is not jaundiced.      Findings: No rash.   Neurological:      General: No focal deficit present.      Mental Status: He is alert. Mental status is at baseline.      Cranial Nerves: No cranial nerve deficit.      Motor: No weakness.      Gait: Gait normal.   Psychiatric:         Mood and Affect: Mood normal.         Behavior: Behavior normal.         Thought Content: Thought content normal.         Judgment: Judgment normal.           Assessment/Plan:  Tejinder was seen today for diabetes and medicare awv.    Diagnoses and all orders for this visit:    Type 2 diabetes mellitus without complication, without long-term current use of insulin (HCC)    Polymyositis (HCC)  -     azaTHIOprine (IMURAN) 50 MG tablet; Take 3 tablets in the morning and 2 tablets at night for a total dose of 250mg daily.  -     methylPREDNISolone (MEDROL) 4 MG tablet; Take 1 tablet by mouth daily (with breakfast)  -     Sedimentation Rate; Future  -     C-Reactive Protein; Future  -     CK; Future    Dysthymic disorder  -     DULoxetine (CYMBALTA) 30 MG extended release capsule; TAKE 1 CAPSULE DAILY    Acquired hypothyroidism  -     levothyroxine (SYNTHROID) 200 MCG tablet; Take 1 tablet by mouth every

## 2025-02-06 ENCOUNTER — TELEPHONE (OUTPATIENT)
Dept: ENDOCRINOLOGY | Age: 65
End: 2025-02-06

## 2025-02-07 NOTE — PROGRESS NOTES
(arteriosclerotic heart disease) 2014    Calcium score = 1056    Bursitis     Cancer (HCC)     basal cell carcinoma    Coronary artery disease     Coronary atherosclerosis due to calcified coronary lesion (CODE) 5/23/2016    Diverticulitis     Duodenal ulcer     DVT (deep venous thrombosis) (HCC)     2 episodes    Elevated liver enzymes 3/1/2018    GERD (gastroesophageal reflux disease)     controlled with omeprazole    HTN (hypertension)     controlled with medsication    Hypercholesterolemia     Hypothyroidism     Kidney stones     Myositis     Obesity     Personal history of DVT (deep vein thrombosis) 11/18/2016    Protein C deficiency (HCC)     Rhabdomyolysis 2018    [facility] DT    Sleep apnea     using CPAP    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 (HCC)     Varicella      Past Surgical History:   Procedure Laterality Date    CARDIAC CATHETERIZATION  2015    no stents    CHOLECYSTECTOMY, LAPAROSCOPIC N/A 3/7/2023    LAPAROSCOPIC CHOLECYSTECTOMY; TAJ-CUT LIVER BIOPSIES performed by Jonathan Hudson MD at [facility] MAIN OR    COLONOSCOPY  2013    HEENT Right 11/2020    skin lesion bx of ear    OTHER SURGICAL HISTORY  02/2022    dental surgery    URETEROLITHOTOMY Right 2013    WISDOM TOOTH EXTRACTION  1980's     Family History   Problem Relation Age of Onset    Kidney Disease Son         secondary to congenital vessel disease    Kidney Disease Paternal Uncle     Diabetes Paternal Uncle     Heart Surgery Paternal Uncle         in 70s    Heart Attack Paternal Uncle     Heart Surgery Paternal Aunt     Alzheimer's Disease Father     Heart Surgery Father         mid to late 60s    Heart Disease Father     Other Mother         \"valve issue\"    Pulmonary Embolism Mother         protien C deficiency    Hypertension Brother     Sleep Apnea Brother      Social History     Tobacco Use    Smoking status: Former     Current packs/day: 0.00     Average packs/day: 1 pack/day for 15.0 years (15.0 ttl pk-yrs)     Types: Cigarettes     Start

## 2025-02-10 ENCOUNTER — OFFICE VISIT (OUTPATIENT)
Age: 65
End: 2025-02-10
Payer: MEDICARE

## 2025-02-10 VITALS
SYSTOLIC BLOOD PRESSURE: 118 MMHG | HEART RATE: 84 BPM | DIASTOLIC BLOOD PRESSURE: 70 MMHG | BODY MASS INDEX: 38.14 KG/M2 | WEIGHT: 243 LBS | HEIGHT: 67 IN

## 2025-02-10 DIAGNOSIS — E78.5 DYSLIPIDEMIA: ICD-10-CM

## 2025-02-10 DIAGNOSIS — Z78.9 STATIN INTOLERANCE: ICD-10-CM

## 2025-02-10 DIAGNOSIS — I25.118 CORONARY ARTERY DISEASE OF NATIVE ARTERY OF NATIVE HEART WITH STABLE ANGINA PECTORIS (HCC): ICD-10-CM

## 2025-02-10 DIAGNOSIS — I10 ESSENTIAL HYPERTENSION: Primary | ICD-10-CM

## 2025-02-10 PROCEDURE — G8428 CUR MEDS NOT DOCUMENT: HCPCS | Performed by: INTERNAL MEDICINE

## 2025-02-10 PROCEDURE — 3074F SYST BP LT 130 MM HG: CPT | Performed by: INTERNAL MEDICINE

## 2025-02-10 PROCEDURE — 1123F ACP DISCUSS/DSCN MKR DOCD: CPT | Performed by: INTERNAL MEDICINE

## 2025-02-10 PROCEDURE — G8417 CALC BMI ABV UP PARAM F/U: HCPCS | Performed by: INTERNAL MEDICINE

## 2025-02-10 PROCEDURE — 1036F TOBACCO NON-USER: CPT | Performed by: INTERNAL MEDICINE

## 2025-02-10 PROCEDURE — 3017F COLORECTAL CA SCREEN DOC REV: CPT | Performed by: INTERNAL MEDICINE

## 2025-02-10 PROCEDURE — 99214 OFFICE O/P EST MOD 30 MIN: CPT | Performed by: INTERNAL MEDICINE

## 2025-02-10 PROCEDURE — 3078F DIAST BP <80 MM HG: CPT | Performed by: INTERNAL MEDICINE

## 2025-02-10 RX ORDER — METOPROLOL SUCCINATE 50 MG/1
50 TABLET, EXTENDED RELEASE ORAL DAILY
Qty: 90 TABLET | Refills: 3 | Status: SHIPPED | OUTPATIENT
Start: 2025-02-10

## 2025-02-10 RX ORDER — HYDROCHLOROTHIAZIDE 12.5 MG/1
12.5 TABLET ORAL DAILY
Qty: 90 TABLET | Refills: 3 | Status: SHIPPED | OUTPATIENT
Start: 2025-02-10

## 2025-02-10 RX ORDER — AMLODIPINE BESYLATE 5 MG/1
5 TABLET ORAL DAILY
Qty: 90 TABLET | Refills: 3 | Status: SHIPPED | OUTPATIENT
Start: 2025-02-10

## 2025-02-10 RX ORDER — NITROGLYCERIN 0.4 MG/1
0.4 TABLET SUBLINGUAL EVERY 5 MIN PRN
Qty: 75 TABLET | Refills: 1 | Status: SHIPPED | OUTPATIENT
Start: 2025-02-10

## 2025-02-10 RX ORDER — LOSARTAN POTASSIUM 100 MG/1
100 TABLET ORAL DAILY
Qty: 90 TABLET | Refills: 3 | Status: SHIPPED | OUTPATIENT
Start: 2025-02-10

## 2025-02-10 RX ORDER — EZETIMIBE 10 MG/1
10 TABLET ORAL DAILY
Qty: 90 TABLET | Refills: 3 | Status: SHIPPED | OUTPATIENT
Start: 2025-02-10

## 2025-02-10 RX ORDER — EVOLOCUMAB 140 MG/ML
140 INJECTION, SOLUTION SUBCUTANEOUS
Qty: 2 ADJUSTABLE DOSE PRE-FILLED PEN SYRINGE | Refills: 11 | Status: SHIPPED | OUTPATIENT
Start: 2025-02-10

## 2025-02-10 ASSESSMENT — ENCOUNTER SYMPTOMS: SHORTNESS OF BREATH: 0

## 2025-02-10 NOTE — PATIENT INSTRUCTIONS
Patient Education        Learning About the Mediterranean Diet  What is the Mediterranean diet?     The Mediterranean diet is a style of eating rather than a diet plan. It features foods eaten in [place], [place], southern [place], and other countries along the [place] Sea. It emphasizes eating foods like fish, fruits, vegetables, beans, high-fiber breads and whole grains, nuts, and olive oil. This style of eating includes limited red meat, cheese, and sweets.  Why choose the Mediterranean diet?  A Mediterranean-style diet may improve heart health. It contains more fat than other heart-healthy diets. But the fats are mainly from nuts, unsaturated oils (such as fish oils and olive oil), and certain nut or seed oils (such as canola, soybean, or flaxseed oil). These fats may help protect the heart and blood vessels.  How can you get started on the Mediterranean diet?  Here are some things you can do to switch to a more Mediterranean way of eating.  What to eat  Eat a variety of fruits and vegetables each day, such as grapes, blueberries, tomatoes, broccoli, peppers, figs, olives, spinach, eggplant, beans, lentils, and chickpeas.  Eat a variety of whole-grain foods each day, such as oats, brown rice, and whole wheat bread, pasta, and couscous.  Eat fish at least 2 times a week. Try tuna, salmon, mackerel, lake trout, herring, or sardines.  Eat moderate amounts of low-fat dairy products, such as milk, cheese, or yogurt.  Eat moderate amounts of poultry and eggs.  Choose healthy (unsaturated) fats, such as nuts, olive oil, and certain nut or seed oils like canola, soybean, and flaxseed.  Limit unhealthy (saturated) fats, such as butter, palm oil, and coconut oil. And limit fats found in animal products, such as meat and dairy products made with whole milk. Try to eat red meat only a few times a month in very small amounts.  Limit sweets and desserts to only a few times a week. This includes sugar-sweetened

## 2025-03-13 RX ORDER — AMLODIPINE BESYLATE 5 MG/1
5 TABLET ORAL DAILY
Qty: 90 TABLET | Refills: 3 | OUTPATIENT
Start: 2025-03-13

## 2025-03-26 ENCOUNTER — TELEPHONE (OUTPATIENT)
Dept: ENDOCRINOLOGY | Age: 65
End: 2025-03-26

## 2025-04-29 ASSESSMENT — SLEEP AND FATIGUE QUESTIONNAIRES
HOW LIKELY ARE YOU TO NOD OFF OR FALL ASLEEP WHILE SITTING INACTIVE IN A PUBLIC PLACE: WOULD NEVER DOZE
ESS TOTAL SCORE: 5
HOW LIKELY ARE YOU TO NOD OFF OR FALL ASLEEP WHILE SITTING INACTIVE IN A PUBLIC PLACE: WOULD NEVER DOZE
HOW LIKELY ARE YOU TO NOD OFF OR FALL ASLEEP WHILE WATCHING TV: SLIGHT CHANCE OF DOZING
HOW LIKELY ARE YOU TO NOD OFF OR FALL ASLEEP WHEN YOU ARE A PASSENGER IN A CAR FOR AN HOUR WITHOUT A BREAK: WOULD NEVER DOZE
HOW LIKELY ARE YOU TO NOD OFF OR FALL ASLEEP WHILE SITTING QUIETLY AFTER LUNCH WITHOUT ALCOHOL: SLIGHT CHANCE OF DOZING
HOW LIKELY ARE YOU TO NOD OFF OR FALL ASLEEP WHEN YOU ARE A PASSENGER IN A CAR FOR AN HOUR WITHOUT A BREAK: WOULD NEVER DOZE
HOW LIKELY ARE YOU TO NOD OFF OR FALL ASLEEP IN A CAR, WHILE STOPPED FOR A FEW MINUTES IN TRAFFIC: WOULD NEVER DOZE
HOW LIKELY ARE YOU TO NOD OFF OR FALL ASLEEP IN A CAR, WHILE STOPPED FOR A FEW MINUTES IN TRAFFIC: WOULD NEVER DOZE
HOW LIKELY ARE YOU TO NOD OFF OR FALL ASLEEP WHILE SITTING QUIETLY AFTER LUNCH WITHOUT ALCOHOL: SLIGHT CHANCE OF DOZING
HOW LIKELY ARE YOU TO NOD OFF OR FALL ASLEEP WHILE SITTING AND READING: SLIGHT CHANCE OF DOZING
HOW LIKELY ARE YOU TO NOD OFF OR FALL ASLEEP WHILE WATCHING TV: SLIGHT CHANCE OF DOZING
HOW LIKELY ARE YOU TO NOD OFF OR FALL ASLEEP WHILE LYING DOWN TO REST IN THE AFTERNOON WHEN CIRCUMSTANCES PERMIT: MODERATE CHANCE OF DOZING
HOW LIKELY ARE YOU TO NOD OFF OR FALL ASLEEP WHILE SITTING AND TALKING TO SOMEONE: WOULD NEVER DOZE
HOW LIKELY ARE YOU TO NOD OFF OR FALL ASLEEP WHILE SITTING AND READING: SLIGHT CHANCE OF DOZING
HOW LIKELY ARE YOU TO NOD OFF OR FALL ASLEEP WHILE LYING DOWN TO REST IN THE AFTERNOON WHEN CIRCUMSTANCES PERMIT: MODERATE CHANCE OF DOZING
HOW LIKELY ARE YOU TO NOD OFF OR FALL ASLEEP WHILE SITTING AND TALKING TO SOMEONE: WOULD NEVER DOZE

## 2025-05-01 NOTE — PROGRESS NOTES
Patient mother Suzanne called r/s lab f/u appt 4-17-25 to 5-13-25 with Eva. She is asking for 1 refill RX testosterone and is very concerned about his low testosterone levels and states this can be dangerous for him. She states he is very sick and can not make it today. Requesting call back from [facility] she is aware [facility] is out of office will be addressed with Dr. Velasquez. If patient is having an emergency to go to ER. Suzanne was made aware this has been addressed with Dr. Velasquez twice and patient does require in OV to receive any refills. Dr. Velasquez is aware of patient testosterone levels from 4/2025 and again advises patient to keep appt may 13 to receive refills. Suzanne understood.    kg (241 lb)   Height: 1.702 m (5' 7\")        Body mass index is 37.75 kg/m².         GENERAL APPEARANCE:   The patient is over weight and in no respiratory distress.   HEENT:   PERRL.  Conjunctivae unremarkable.   Nasal mucosa is without epistaxis, exudate, or polyps.  Gums and dentition are unremarkable.  There is  oropharyngeal narrowing.     NECK/LYMPHATIC:   Symmetrical with no elevation of jugular venous pulsation.  Trachea midline. No thyroid enlargement.  No cervical adenopathy.   LUNGS:   Normal respiratory effort with symmetrical lung expansion.   Breath sounds clear.   HEART:   There is a regular rate and rhythm.  No murmur, rub, or gallop.  There is no edema in the lower extremities.   ABDOMEN:   Soft and non-tender.  No hepatosplenomegaly.  Bowel sounds are normal.     NEURO:   The patient is alert and oriented to person, place, and time.  Memory appears intact and mood is normal.  No gross sensorimotor deficits are present.          ASSESSMENT:  (Medical Decision Making)      Diagnosis Orders   1. Severe obstructive sleep apnea  DME - DURABLE MEDICAL EQUIPMENT   AHI is well-controlled on PAP therapy.  Patient is tolerating benefiting from CPAP.  Will continue current settings and renew supplies today.   2. Obesity (BMI 30-39.9)  DME - DURABLE MEDICAL EQUIPMENT   Patient was previously losing weight with Mounjaro however it was no longer covered by insurance.  He does qualify for Zepbound based off of his severe sleep apnea.  Will send letter to primary care.        PLAN:  Continue CPAP with nightly compliance.  Renew supplies today.  Will send letter to PCP regarding Zepbound.  Follow-up in 1 year or sooner if needed.    Orders Placed This Encounter   Procedures    DME - DURABLE MEDICAL EQUIPMENT     [facility] [place] [address] [place] [id number]  Dept: [phone]      Patient Name: Tejinder De La Fuente  : 1960  Gender: male  Address: [address] [place] [address]

## 2025-05-02 ENCOUNTER — OFFICE VISIT (OUTPATIENT)
Dept: SLEEP MEDICINE | Age: 65
End: 2025-05-02
Payer: MEDICARE

## 2025-05-02 VITALS
DIASTOLIC BLOOD PRESSURE: 78 MMHG | TEMPERATURE: 97.9 F | OXYGEN SATURATION: 94 % | WEIGHT: 241 LBS | HEIGHT: 67 IN | HEART RATE: 74 BPM | SYSTOLIC BLOOD PRESSURE: 116 MMHG | RESPIRATION RATE: 19 BRPM | BODY MASS INDEX: 37.83 KG/M2

## 2025-05-02 DIAGNOSIS — G47.33 SEVERE OBSTRUCTIVE SLEEP APNEA: Primary | ICD-10-CM

## 2025-05-02 DIAGNOSIS — E66.9 OBESITY (BMI 30-39.9): ICD-10-CM

## 2025-05-02 PROCEDURE — G8417 CALC BMI ABV UP PARAM F/U: HCPCS | Performed by: STUDENT IN AN ORGANIZED HEALTH CARE EDUCATION/TRAINING PROGRAM

## 2025-05-02 PROCEDURE — 1036F TOBACCO NON-USER: CPT | Performed by: STUDENT IN AN ORGANIZED HEALTH CARE EDUCATION/TRAINING PROGRAM

## 2025-05-02 PROCEDURE — 1123F ACP DISCUSS/DSCN MKR DOCD: CPT | Performed by: STUDENT IN AN ORGANIZED HEALTH CARE EDUCATION/TRAINING PROGRAM

## 2025-05-02 PROCEDURE — G2211 COMPLEX E/M VISIT ADD ON: HCPCS | Performed by: STUDENT IN AN ORGANIZED HEALTH CARE EDUCATION/TRAINING PROGRAM

## 2025-05-02 PROCEDURE — 99214 OFFICE O/P EST MOD 30 MIN: CPT | Performed by: STUDENT IN AN ORGANIZED HEALTH CARE EDUCATION/TRAINING PROGRAM

## 2025-05-02 PROCEDURE — 3078F DIAST BP <80 MM HG: CPT | Performed by: STUDENT IN AN ORGANIZED HEALTH CARE EDUCATION/TRAINING PROGRAM

## 2025-05-02 PROCEDURE — 3074F SYST BP LT 130 MM HG: CPT | Performed by: STUDENT IN AN ORGANIZED HEALTH CARE EDUCATION/TRAINING PROGRAM

## 2025-05-02 PROCEDURE — 3017F COLORECTAL CA SCREEN DOC REV: CPT | Performed by: STUDENT IN AN ORGANIZED HEALTH CARE EDUCATION/TRAINING PROGRAM

## 2025-05-02 PROCEDURE — G8427 DOCREV CUR MEDS BY ELIG CLIN: HCPCS | Performed by: STUDENT IN AN ORGANIZED HEALTH CARE EDUCATION/TRAINING PROGRAM

## 2025-05-12 ENCOUNTER — OFFICE VISIT (OUTPATIENT)
Dept: ENDOCRINOLOGY | Age: 65
End: 2025-05-12
Payer: MEDICARE

## 2025-05-12 VITALS
HEART RATE: 86 BPM | OXYGEN SATURATION: 98 % | DIASTOLIC BLOOD PRESSURE: 80 MMHG | SYSTOLIC BLOOD PRESSURE: 130 MMHG | HEIGHT: 67 IN | BODY MASS INDEX: 37.98 KG/M2 | WEIGHT: 242 LBS

## 2025-05-12 DIAGNOSIS — K76.0 FATTY LIVER: ICD-10-CM

## 2025-05-12 DIAGNOSIS — I10 PRIMARY HYPERTENSION: ICD-10-CM

## 2025-05-12 DIAGNOSIS — E03.9 PRIMARY HYPOTHYROIDISM: Primary | ICD-10-CM

## 2025-05-12 DIAGNOSIS — Z78.9 STATIN INTOLERANCE: ICD-10-CM

## 2025-05-12 DIAGNOSIS — E66.9 OBESITY (BMI 30-39.9): ICD-10-CM

## 2025-05-12 DIAGNOSIS — E11.65 TYPE 2 DIABETES MELLITUS WITH HYPERGLYCEMIA, WITHOUT LONG-TERM CURRENT USE OF INSULIN (HCC): ICD-10-CM

## 2025-05-12 DIAGNOSIS — G47.33 SEVERE OBSTRUCTIVE SLEEP APNEA: ICD-10-CM

## 2025-05-12 DIAGNOSIS — E03.9 PRIMARY HYPOTHYROIDISM: ICD-10-CM

## 2025-05-12 DIAGNOSIS — I25.10 CORONARY ARTERY DISEASE INVOLVING NATIVE CORONARY ARTERY OF NATIVE HEART WITHOUT ANGINA PECTORIS: ICD-10-CM

## 2025-05-12 LAB
HBA1C MFR BLD: 6.3 %
T4 FREE SERPL-MCNC: 1.6 NG/DL (ref 0.9–1.7)
TSH, 3RD GENERATION: 0.12 UIU/ML (ref 0.27–4.2)

## 2025-05-12 PROCEDURE — G2211 COMPLEX E/M VISIT ADD ON: HCPCS | Performed by: PHYSICIAN ASSISTANT

## 2025-05-12 PROCEDURE — 1036F TOBACCO NON-USER: CPT | Performed by: PHYSICIAN ASSISTANT

## 2025-05-12 PROCEDURE — 2022F DILAT RTA XM EVC RTNOPTHY: CPT | Performed by: PHYSICIAN ASSISTANT

## 2025-05-12 PROCEDURE — 3017F COLORECTAL CA SCREEN DOC REV: CPT | Performed by: PHYSICIAN ASSISTANT

## 2025-05-12 PROCEDURE — 3044F HG A1C LEVEL LT 7.0%: CPT | Performed by: PHYSICIAN ASSISTANT

## 2025-05-12 PROCEDURE — G8417 CALC BMI ABV UP PARAM F/U: HCPCS | Performed by: PHYSICIAN ASSISTANT

## 2025-05-12 PROCEDURE — 83036 HEMOGLOBIN GLYCOSYLATED A1C: CPT | Performed by: PHYSICIAN ASSISTANT

## 2025-05-12 PROCEDURE — 3079F DIAST BP 80-89 MM HG: CPT | Performed by: PHYSICIAN ASSISTANT

## 2025-05-12 PROCEDURE — 99214 OFFICE O/P EST MOD 30 MIN: CPT | Performed by: PHYSICIAN ASSISTANT

## 2025-05-12 PROCEDURE — 3075F SYST BP GE 130 - 139MM HG: CPT | Performed by: PHYSICIAN ASSISTANT

## 2025-05-12 PROCEDURE — 1123F ACP DISCUSS/DSCN MKR DOCD: CPT | Performed by: PHYSICIAN ASSISTANT

## 2025-05-12 PROCEDURE — 3046F HEMOGLOBIN A1C LEVEL >9.0%: CPT | Performed by: PHYSICIAN ASSISTANT

## 2025-05-12 PROCEDURE — G8427 DOCREV CUR MEDS BY ELIG CLIN: HCPCS | Performed by: PHYSICIAN ASSISTANT

## 2025-05-12 ASSESSMENT — ENCOUNTER SYMPTOMS
CONSTIPATION: 0
TROUBLE SWALLOWING: 0
DIARRHEA: 1
NAUSEA: 0
VOICE CHANGE: 0
VOMITING: 0

## 2025-05-12 NOTE — PROGRESS NOTES
[facility] ENDOCRINOLOGY   AND   THYROID NODULE CLINIC    Linda Olson PA-C  [facility] [place] [facility] Endocrinology and Thyroid Nodule Clinic  [address] [place] [address], [facility] [address] [address]  Phone [phone]  Facsimile [phone]          Tejinder De La Fuente is a 64 y.o. male seen 5/12/2025 for follow up evaluation of primary hypothryoidism and steroid induced diabetes         Assessment and Plan:    Assessment & Plan      1. Primary hypothyroidism  On 200 mcg levothyroxine, half tablet once weekly. [id number] but trouble remembering to take the half tab  Diagnostic plan: Order labs today to assess thyroid function.  Treatment plan: Adjust dose to 175 mcg daily if labs indicate he remains overtreated. Follow-up labs 8 weeks after any dose adjustment.  Clinical decision making:   - TSH; Future  - T4, Free; Future    2. Type 2 diabetes mellitus with hyperglycemia, without long-term current use of insulin (HCC)  Pt with type 2 diabetes complicated by daily steroid use  A1c increased to 6.3 from 6.1 in 11/2024, previously 5.9 on Ozempic. Now off of all GLP-1 secondary to coverage issues. Mounjaro was effective and well tolerated but not covered, currently in appeal process   Diagnostic plan:   Treatment plan: Blood sugars well-controlled, typically around 100 or less, except for non-fasting 151 after coffee. Advise monitoring blood glucose a few times per week, especially during medication changes. Pending appeal for Mounjaro coverage; consider alternative GLP-1 receptor agonist if not approved.  - AMB POC HEMOGLOBIN A1C  -  DIABETES FOOT EXAM    3. Primary hypertension  stable  BP Readings from Last 3 Encounters:   05/12/25 130/80   05/02/25 116/78   02/10/25 118/70       4. Obesity (BMI 30-39.9)  Patient would certainly benefit from improved glycemic control with agents that are either weight neutral or confer weight loss as a side effect.      5. Coronary artery disease involving native coronary artery of native

## 2025-05-13 ENCOUNTER — RESULTS FOLLOW-UP (OUTPATIENT)
Dept: ENDOCRINOLOGY | Age: 65
End: 2025-05-13

## 2025-05-13 DIAGNOSIS — E03.9 PRIMARY HYPOTHYROIDISM: Primary | ICD-10-CM

## 2025-05-13 RX ORDER — LEVOTHYROXINE SODIUM 175 UG/1
175 TABLET ORAL DAILY
Qty: 90 TABLET | Refills: 1 | Status: SHIPPED | OUTPATIENT
Start: 2025-05-13

## 2025-05-13 NOTE — TELEPHONE ENCOUNTER
Paula response:    As discussed, as you are still overtreated on the 200mcg dose of levothyroxine, I would like to reduce this to 175mcg. Please continue to take it correctly and plan to repeat labs in about 8 weeks around July 8th    Let me know if any issues    ~Linda

## 2025-06-01 ASSESSMENT — PATIENT HEALTH QUESTIONNAIRE - PHQ9
SUM OF ALL RESPONSES TO PHQ QUESTIONS 1-9: 0
1. LITTLE INTEREST OR PLEASURE IN DOING THINGS: NOT AT ALL
SUM OF ALL RESPONSES TO PHQ QUESTIONS 1-9: 0
1. LITTLE INTEREST OR PLEASURE IN DOING THINGS: NOT AT ALL
SUM OF ALL RESPONSES TO PHQ QUESTIONS 1-9: 0
SUM OF ALL RESPONSES TO PHQ QUESTIONS 1-9: 0
2. FEELING DOWN, DEPRESSED OR HOPELESS: NOT AT ALL
SUM OF ALL RESPONSES TO PHQ9 QUESTIONS 1 & 2: 0
2. FEELING DOWN, DEPRESSED OR HOPELESS: NOT AT ALL

## 2025-06-04 ENCOUNTER — OFFICE VISIT (OUTPATIENT)
Dept: INTERNAL MEDICINE CLINIC | Facility: CLINIC | Age: 65
End: 2025-06-04
Payer: MEDICARE

## 2025-06-04 VITALS
OXYGEN SATURATION: 97 % | BODY MASS INDEX: 38.72 KG/M2 | WEIGHT: 246.7 LBS | HEIGHT: 67 IN | SYSTOLIC BLOOD PRESSURE: 118 MMHG | HEART RATE: 60 BPM | DIASTOLIC BLOOD PRESSURE: 80 MMHG

## 2025-06-04 DIAGNOSIS — Z79.01 CHRONIC ANTICOAGULATION: ICD-10-CM

## 2025-06-04 DIAGNOSIS — E11.9 TYPE 2 DIABETES MELLITUS WITHOUT COMPLICATION, WITHOUT LONG-TERM CURRENT USE OF INSULIN (HCC): Primary | ICD-10-CM

## 2025-06-04 DIAGNOSIS — F34.1 DYSTHYMIC DISORDER: ICD-10-CM

## 2025-06-04 DIAGNOSIS — M33.20 POLYMYOSITIS (HCC): ICD-10-CM

## 2025-06-04 LAB
CREAT UR-MCNC: 43.7 MG/DL (ref 39–259)
MICROALBUMIN UR-MCNC: <1.2 MG/DL (ref 0–20)
MICROALBUMIN/CREAT UR-RTO: NORMAL MG/G (ref 0–30)

## 2025-06-04 PROCEDURE — 3074F SYST BP LT 130 MM HG: CPT | Performed by: INTERNAL MEDICINE

## 2025-06-04 PROCEDURE — G2211 COMPLEX E/M VISIT ADD ON: HCPCS | Performed by: INTERNAL MEDICINE

## 2025-06-04 PROCEDURE — G8427 DOCREV CUR MEDS BY ELIG CLIN: HCPCS | Performed by: INTERNAL MEDICINE

## 2025-06-04 PROCEDURE — G8417 CALC BMI ABV UP PARAM F/U: HCPCS | Performed by: INTERNAL MEDICINE

## 2025-06-04 PROCEDURE — 99214 OFFICE O/P EST MOD 30 MIN: CPT | Performed by: INTERNAL MEDICINE

## 2025-06-04 PROCEDURE — 3044F HG A1C LEVEL LT 7.0%: CPT | Performed by: INTERNAL MEDICINE

## 2025-06-04 PROCEDURE — 3079F DIAST BP 80-89 MM HG: CPT | Performed by: INTERNAL MEDICINE

## 2025-06-04 PROCEDURE — 3017F COLORECTAL CA SCREEN DOC REV: CPT | Performed by: INTERNAL MEDICINE

## 2025-06-04 PROCEDURE — 1036F TOBACCO NON-USER: CPT | Performed by: INTERNAL MEDICINE

## 2025-06-04 PROCEDURE — 3046F HEMOGLOBIN A1C LEVEL >9.0%: CPT | Performed by: INTERNAL MEDICINE

## 2025-06-04 PROCEDURE — 1123F ACP DISCUSS/DSCN MKR DOCD: CPT | Performed by: INTERNAL MEDICINE

## 2025-06-04 PROCEDURE — 2022F DILAT RTA XM EVC RTNOPTHY: CPT | Performed by: INTERNAL MEDICINE

## 2025-06-04 RX ORDER — RIVAROXABAN 10 MG/1
10 TABLET, FILM COATED ORAL
Qty: 90 TABLET | Refills: 1 | Status: SHIPPED | OUTPATIENT
Start: 2025-06-04

## 2025-06-04 RX ORDER — AZATHIOPRINE 50 MG/1
TABLET ORAL
Qty: 450 TABLET | Refills: 1 | Status: SHIPPED | OUTPATIENT
Start: 2025-06-04

## 2025-06-04 RX ORDER — DULOXETIN HYDROCHLORIDE 30 MG/1
CAPSULE, DELAYED RELEASE ORAL
Qty: 90 CAPSULE | Refills: 1 | Status: SHIPPED | OUTPATIENT
Start: 2025-06-04

## 2025-06-04 ASSESSMENT — ENCOUNTER SYMPTOMS: SHORTNESS OF BREATH: 0

## 2025-06-04 NOTE — PROGRESS NOTES
[facility]  Dylon Gallagher M.D.  Internal Medicine  [address] [place] [id number]  Office : [phone]  Fax : [phone]    Chief Complaint   Patient presents with    Diabetes     4 month follow up       History of Present Illness:  Tejinder De La Fuente is a 64 y.o. male.  HPI    Diabetes Mellitus  Patient presents  for follow up on diabetes.  Onset of symptoms was several years ago. Patient describes symptoms as none. Course to date has been well controlled.Diet and Lifestyle: follows a diabetic diet regularly  exercises sporadically  nonsmoker  Home glucose monitoring:  Results average n/a. Patient denies polyuria and polydipsia. No wounds in lower limbs.  Most recent HbA1c was   Hemoglobin A1C   Date Value Ref Range Status   01/23/2024 5.9 (H) 4.8 - 5.6 % Final     Hemoglobin A1C, POC   Date Value Ref Range Status   05/12/2025 6.3 % Final       Hypertension  Patient is in for Hypertension which is stable.    Diet and Lifestyle: generally follows a low sodium diet  Home BP Monitoring: is not measured at home. Patient's recent blood pressures were   BP Readings from Last 3 Encounters:   06/04/25 118/80   05/12/25 130/80   05/02/25 116/78   .   taking medications as instructed, no medication side effects noted, no TIA's, no chest pain on exertion, no dyspnea on exertion, no swelling of ankles. Cardiac risk factors consist of advanced age (older than 55 for men, 65 for women), dyslipidemia, hypertension, male gender, and obesity (BMI >= 30 kg/m2).  Lab Results   Component Value Date/Time     02/04/2025 10:50 AM    K 4.2 02/04/2025 10:50 AM     02/04/2025 10:50 AM    CO2 25 02/04/2025 10:50 AM    BUN 15 02/04/2025 10:50 AM    CREATININE 0.80 02/04/2025 10:50 AM    GLUCOSE 88 02/04/2025 10:50 AM    CALCIUM 9.7 02/04/2025 10:50 AM    LABGLOM >90 02/04/2025 10:50 AM    LABGLOM >60 01/23/2024 09:12 AM        Hyperlipidemia  Patient is in for follow-up

## 2025-06-05 ENCOUNTER — RESULTS FOLLOW-UP (OUTPATIENT)
Dept: INTERNAL MEDICINE CLINIC | Facility: CLINIC | Age: 65
End: 2025-06-05

## 2025-07-10 DIAGNOSIS — E03.9 PRIMARY HYPOTHYROIDISM: ICD-10-CM

## 2025-07-10 LAB
T4 FREE SERPL-MCNC: 1.6 NG/DL (ref 0.9–1.7)
TSH, 3RD GENERATION: 0.27 UIU/ML (ref 0.27–4.2)

## 2025-07-21 ENCOUNTER — PATIENT MESSAGE (OUTPATIENT)
Dept: ENDOCRINOLOGY | Age: 65
End: 2025-07-21

## 2025-07-21 DIAGNOSIS — E11.65 TYPE 2 DIABETES MELLITUS WITH HYPERGLYCEMIA, WITHOUT LONG-TERM CURRENT USE OF INSULIN (HCC): Primary | ICD-10-CM

## 2025-07-21 DIAGNOSIS — G47.33 SEVERE OBSTRUCTIVE SLEEP APNEA: ICD-10-CM

## 2025-07-21 RX ORDER — TIRZEPATIDE 7.5 MG/.5ML
7.5 INJECTION, SOLUTION SUBCUTANEOUS
Qty: 6 ML | Refills: 3 | Status: SHIPPED | OUTPATIENT
Start: 2025-07-21

## 2025-07-21 NOTE — TELEPHONE ENCOUNTER
Paula response:    7.5mg mounjaro sent to replace the 5mg dose.    Remember, I'm giving it to you for your diabetes. Has them medication helped with your blood sugars?    The studies show that people who do light but regular exercise are more likely to lose more weight on these medications.    Lasha

## 2025-08-22 ENCOUNTER — OFFICE VISIT (OUTPATIENT)
Dept: ENDOCRINOLOGY | Age: 65
End: 2025-08-22
Payer: MEDICARE

## 2025-08-22 VITALS
WEIGHT: 240.4 LBS | SYSTOLIC BLOOD PRESSURE: 124 MMHG | HEIGHT: 67 IN | BODY MASS INDEX: 37.73 KG/M2 | OXYGEN SATURATION: 97 % | DIASTOLIC BLOOD PRESSURE: 82 MMHG | HEART RATE: 81 BPM

## 2025-08-22 DIAGNOSIS — K76.0 FATTY LIVER: ICD-10-CM

## 2025-08-22 DIAGNOSIS — Z71.82 EXERCISE COUNSELING: ICD-10-CM

## 2025-08-22 DIAGNOSIS — I25.10 CORONARY ARTERY DISEASE INVOLVING NATIVE CORONARY ARTERY OF NATIVE HEART WITHOUT ANGINA PECTORIS: ICD-10-CM

## 2025-08-22 DIAGNOSIS — G47.33 SEVERE OBSTRUCTIVE SLEEP APNEA: ICD-10-CM

## 2025-08-22 DIAGNOSIS — Z78.9 STATIN INTOLERANCE: ICD-10-CM

## 2025-08-22 DIAGNOSIS — E66.9 OBESITY (BMI 30-39.9): ICD-10-CM

## 2025-08-22 DIAGNOSIS — I10 PRIMARY HYPERTENSION: ICD-10-CM

## 2025-08-22 DIAGNOSIS — E11.65 TYPE 2 DIABETES MELLITUS WITH HYPERGLYCEMIA, WITHOUT LONG-TERM CURRENT USE OF INSULIN (HCC): ICD-10-CM

## 2025-08-22 DIAGNOSIS — E03.9 PRIMARY HYPOTHYROIDISM: Primary | ICD-10-CM

## 2025-08-22 LAB — HBA1C MFR BLD: 5.9 %

## 2025-08-22 PROCEDURE — 83036 HEMOGLOBIN GLYCOSYLATED A1C: CPT | Performed by: PHYSICIAN ASSISTANT

## 2025-08-22 PROCEDURE — 3074F SYST BP LT 130 MM HG: CPT | Performed by: PHYSICIAN ASSISTANT

## 2025-08-22 PROCEDURE — 2022F DILAT RTA XM EVC RTNOPTHY: CPT | Performed by: PHYSICIAN ASSISTANT

## 2025-08-22 PROCEDURE — 3017F COLORECTAL CA SCREEN DOC REV: CPT | Performed by: PHYSICIAN ASSISTANT

## 2025-08-22 PROCEDURE — 3079F DIAST BP 80-89 MM HG: CPT | Performed by: PHYSICIAN ASSISTANT

## 2025-08-22 PROCEDURE — 3044F HG A1C LEVEL LT 7.0%: CPT | Performed by: PHYSICIAN ASSISTANT

## 2025-08-22 PROCEDURE — G8427 DOCREV CUR MEDS BY ELIG CLIN: HCPCS | Performed by: PHYSICIAN ASSISTANT

## 2025-08-22 PROCEDURE — G2211 COMPLEX E/M VISIT ADD ON: HCPCS | Performed by: PHYSICIAN ASSISTANT

## 2025-08-22 PROCEDURE — G8417 CALC BMI ABV UP PARAM F/U: HCPCS | Performed by: PHYSICIAN ASSISTANT

## 2025-08-22 PROCEDURE — 99214 OFFICE O/P EST MOD 30 MIN: CPT | Performed by: PHYSICIAN ASSISTANT

## 2025-08-22 PROCEDURE — 1036F TOBACCO NON-USER: CPT | Performed by: PHYSICIAN ASSISTANT

## 2025-08-22 PROCEDURE — 3046F HEMOGLOBIN A1C LEVEL >9.0%: CPT | Performed by: PHYSICIAN ASSISTANT

## 2025-08-22 PROCEDURE — 1123F ACP DISCUSS/DSCN MKR DOCD: CPT | Performed by: PHYSICIAN ASSISTANT

## 2025-08-22 RX ORDER — METFORMIN HYDROCHLORIDE 750 MG/1
750 TABLET, EXTENDED RELEASE ORAL DAILY
Qty: 90 TABLET | Refills: 3 | Status: SHIPPED | OUTPATIENT
Start: 2025-08-22

## 2025-08-22 RX ORDER — EMPAGLIFLOZIN 25 MG/1
25 TABLET, FILM COATED ORAL DAILY
Qty: 90 TABLET | Refills: 3 | Status: SHIPPED | OUTPATIENT
Start: 2025-08-22

## 2025-08-22 RX ORDER — TIRZEPATIDE 7.5 MG/.5ML
7.5 INJECTION, SOLUTION SUBCUTANEOUS
Qty: 6 ML | Refills: 3 | Status: SHIPPED | OUTPATIENT
Start: 2025-08-22

## 2025-08-22 ASSESSMENT — ENCOUNTER SYMPTOMS
VOMITING: 0
TROUBLE SWALLOWING: 0
DIARRHEA: 0
NAUSEA: 0
VOICE CHANGE: 0
CONSTIPATION: 0

## (undated) DEVICE — CONTAINER,SPECIMEN,OR STERILE,4OZ: Brand: MEDLINE

## (undated) DEVICE — GARMENT,MEDLINE,DVT,INT,CALF,MED, GEN2: Brand: MEDLINE

## (undated) DEVICE — GLOVE SURG SZ 75 CRM LTX FREE POLYISOPRENE POLYMER BEAD ANTI

## (undated) DEVICE — TROCAR: Brand: KII FIOS FIRST ENTRY

## (undated) DEVICE — GAUZE,SPONGE,2"X2",8PLY,STERILE,LF,2'S: Brand: MEDLINE

## (undated) DEVICE — Device

## (undated) DEVICE — CANISTER, RIGID, 2000CC: Brand: MEDLINE INDUSTRIES, INC.

## (undated) DEVICE — 3M™ TEGADERM™ TRANSPARENT FILM DRESSING FRAME STYLE, 1626W, 4 IN X 4-3/4 IN (10 CM X 12 CM), 50/CT 4CT/CASE: Brand: 3M™ TEGADERM™

## (undated) DEVICE — TROCAR LAPSCP SZ 12 MM 10 CM LEN BLADED N THRD N OPT VW BLNT

## (undated) DEVICE — SUTURE SZ 0 27IN 5/8 CIR UR-6  TAPER PT VIOLET ABSRB VICRYL J603H

## (undated) DEVICE — NEEDLE BPSY JMSHDI 14GA 6NL KDNEY LIVER SOFT TSSUE BVLD TIP

## (undated) DEVICE — GENERAL LAPAROSCOPY: Brand: MEDLINE INDUSTRIES, INC.

## (undated) DEVICE — APPLICATOR MEDICATED 26 CC SOLUTION HI LT ORNG CHLORAPREP

## (undated) DEVICE — SOLUTION IRRIG 1000ML 0.9% SOD CHL USP POUR PLAS BTL

## (undated) DEVICE — TUBING INSUFFLATION SMK EVAC HI FLO SET PNEUMOCLEAR

## (undated) DEVICE — 3M™ TEGADERM™ TRANSPARENT FILM DRESSING FRAME STYLE, 1624W, 2-3/8 IN X 2-3/4 IN (6 CM X 7 CM), 100/CT 4CT/CASE: Brand: 3M™ TEGADERM™

## (undated) DEVICE — ADHESIVE SKIN CLSR 0.7ML TOP DERMBND ADV

## (undated) DEVICE — PAD,NON-ADHERENT,3X8,STERILE,LF,1/PK: Brand: MEDLINE

## (undated) DEVICE — E-Z CLEAN, PTFE COATED, ELECTROSURGICAL LAPAROSCOPIC ELECTRODE, SPATULA, 33 CM., SINGLE-USE, FOR USE WITH HAND CONTROL PENCIL: Brand: MEGADYNE

## (undated) DEVICE — SUTURE MCRYL SZ 4-0 L27IN ABSRB UD L19MM PS-2 1/2 CIR PRIM Y426H

## (undated) DEVICE — TROCAR: Brand: KII® SLEEVE